# Patient Record
Sex: FEMALE | Race: AMERICAN INDIAN OR ALASKA NATIVE | NOT HISPANIC OR LATINO | Employment: FULL TIME | URBAN - METROPOLITAN AREA
[De-identification: names, ages, dates, MRNs, and addresses within clinical notes are randomized per-mention and may not be internally consistent; named-entity substitution may affect disease eponyms.]

---

## 2018-03-20 ENCOUNTER — HOSPITAL ENCOUNTER (EMERGENCY)
Facility: MEDICAL CENTER | Age: 38
End: 2018-03-20
Attending: EMERGENCY MEDICINE
Payer: MEDICAID

## 2018-03-20 VITALS
SYSTOLIC BLOOD PRESSURE: 112 MMHG | WEIGHT: 215.61 LBS | DIASTOLIC BLOOD PRESSURE: 70 MMHG | TEMPERATURE: 96.9 F | BODY MASS INDEX: 32.68 KG/M2 | OXYGEN SATURATION: 97 % | HEART RATE: 80 BPM | HEIGHT: 68 IN | RESPIRATION RATE: 16 BRPM

## 2018-03-20 DIAGNOSIS — R51.9 NONINTRACTABLE HEADACHE, UNSPECIFIED CHRONICITY PATTERN, UNSPECIFIED HEADACHE TYPE: ICD-10-CM

## 2018-03-20 PROCEDURE — 700111 HCHG RX REV CODE 636 W/ 250 OVERRIDE (IP): Performed by: EMERGENCY MEDICINE

## 2018-03-20 PROCEDURE — 99284 EMERGENCY DEPT VISIT MOD MDM: CPT

## 2018-03-20 PROCEDURE — 96375 TX/PRO/DX INJ NEW DRUG ADDON: CPT

## 2018-03-20 PROCEDURE — 700105 HCHG RX REV CODE 258: Performed by: EMERGENCY MEDICINE

## 2018-03-20 PROCEDURE — 96374 THER/PROPH/DIAG INJ IV PUSH: CPT

## 2018-03-20 RX ORDER — METOCLOPRAMIDE HYDROCHLORIDE 5 MG/ML
10 INJECTION INTRAMUSCULAR; INTRAVENOUS ONCE
Status: COMPLETED | OUTPATIENT
Start: 2018-03-20 | End: 2018-03-20

## 2018-03-20 RX ORDER — SODIUM CHLORIDE 9 MG/ML
1000 INJECTION, SOLUTION INTRAVENOUS ONCE
Status: COMPLETED | OUTPATIENT
Start: 2018-03-20 | End: 2018-03-20

## 2018-03-20 RX ORDER — DEXAMETHASONE SODIUM PHOSPHATE 4 MG/ML
10 INJECTION, SOLUTION INTRA-ARTICULAR; INTRALESIONAL; INTRAMUSCULAR; INTRAVENOUS; SOFT TISSUE ONCE
Status: COMPLETED | OUTPATIENT
Start: 2018-03-20 | End: 2018-03-20

## 2018-03-20 RX ADMIN — METOCLOPRAMIDE 10 MG: 5 INJECTION, SOLUTION INTRAMUSCULAR; INTRAVENOUS at 16:06

## 2018-03-20 RX ADMIN — SODIUM CHLORIDE 1000 ML: 9 INJECTION, SOLUTION INTRAVENOUS at 16:04

## 2018-03-20 RX ADMIN — DEXAMETHASONE SODIUM PHOSPHATE 10 MG: 4 INJECTION, SOLUTION INTRAMUSCULAR; INTRAVENOUS at 16:25

## 2018-03-20 ASSESSMENT — ENCOUNTER SYMPTOMS
SENSORY CHANGE: 1
SPEECH CHANGE: 0
FOCAL WEAKNESS: 0
BLURRED VISION: 1
NAUSEA: 0
VOMITING: 0
FEVER: 0
HEADACHES: 1
PHOTOPHOBIA: 1
COUGH: 0

## 2018-03-20 NOTE — ED TRIAGE NOTES
Chief Complaint   Patient presents with   • Blurred Vision     pt states she started to have vision changes of blurriness while  parking here. pt here visiting a patient. pt was told to come ER to checked out.

## 2018-03-20 NOTE — ED NOTES
Pt ambulated to yellow 67, blurred vision now resolved.  Pt said that he took some norco and ibuprofen and symptoms resolved.

## 2018-03-20 NOTE — ED PROVIDER NOTES
"ED Provider Note    Scribed for Diane Carolina D.O. by Fani Alaniz. 3/20/2018, 3:13 PM.    Primary care provider: Pcp Pt States None  Means of arrival: Walk-in  History obtained from: Patient   History limited by: None     CHIEF COMPLAINT  Chief Complaint   Patient presents with   • Blurred Vision     pt states she started to have vision changes of blurriness while  parking here. pt here visiting a patient. pt was told to come ER to checked out. pt denies headache, weakness, or falls.      HPI  Suellen Barbosa is a 37 y.o. female with history of migraines presents to the Emergency Department for evaluation of vision changes and migraine. Patient reports sudden onset vision changes approximately 1 hour ago. She describes vision changes as \"squiggly lines\" diffusely throughout her visual field with yellow discoloration throughout. Her vision changes has now resolved. Patient states she is also feeling \"shakey\" and dizzy. Which she attributes to not having eaten much today. The patient reports associated headache that was gradual onset since vision changes. Headache  is located to right sided forehead and behind her right eye. She describes the headache pain is sharp and 6 out of 10 in pain severity. Denies pain is radiating. She states associated photophobia. The patient states her current vision changes and symptoms are similar to her usual migraines. She states she took 1 tablet of ibuprofen and one Norco after headache began, because she \"knew it was a migraine\" this medication which did provide some relieved her headache. Denies nausea or vomiting.   Patient also reports that the hydrocodone, which she has because of a toothache, does \"throws her off\".  Patient denies further chronic medical history. She denies cigarette use, alcohol use, or drug use.     REVIEW OF SYSTEMS  Review of Systems   Constitutional: Negative for fever.   HENT: Negative for congestion.    Eyes: Positive for blurred vision and " "photophobia.   Respiratory: Negative for cough.    Gastrointestinal: Negative for nausea and vomiting.   Neurological: Positive for sensory change and headaches. Negative for speech change and focal weakness.   E    PAST MEDICAL HISTORY   The patient denies chronic medical history.   Migraine headaches    SURGICAL HISTORY  patient denies any surgical history    SOCIAL HISTORY  The patient denies cigarrette smoking, alcohol use, or drug use.    FAMILY HISTORY   None noted     CURRENT MEDICATIONS  No current facility-administered medications on file prior to encounter.      No current outpatient prescriptions on file prior to encounter.     ALLERGIES  No Known Allergies    PHYSICAL EXAM  VITAL SIGNS: /69   Pulse 71   Temp 36.1 °C (96.9 °F) (Temporal)   Resp 14   Ht 1.727 m (5' 8\")   Wt 97.8 kg (215 lb 9.8 oz)   SpO2 99%   BMI 32.78 kg/m²     Vitals reviewed.  Constitutional: Patient is oriented to person, place, and time. Appears well-developed and well-nourished. No distress.    Cardiovascular: Normal rate, regular rhythm and normal heart sounds. Normal peripheral pulses.  Pulmonary/Chest: Effort normal and breath sounds normal. No respiratory distress, no wheezes, rhonchi, or rales.  Musculoskeletal: No edema and no tenderness.   Neurological: No focal deficits. CN II-XII grossly intact. Normal strength and sensation grossly.   Skin: Skin is warm and dry. No erythema. No pallor.   Psychiatric: Patient has a normal mood and affect.     COURSE & MEDICAL DECISION MAKING  Pertinent Labs & Imaging studies reviewed. (See chart for details)    3:13 PM - Patient seen and examined at bedside. She presents with a headache and blurred vision. She reports that these symptoms are the same as symptoms she had previously when she had more frequent migraine headaches. Symptoms came on gradually. She also thinks that she has not eaten much today, this is also contributing to her headache. Patient will be treated with " "Reglan 10 mg and dexamethasone 10 mg. The patient will be given IV fluids despite normal vital signs. Suspect, that this is a migraine headache and likely a vascular headache and most of these patients, benefit from IV fluid resuscitation.     4:51 PM - Recheck: Patient is resting comfortably and reports feeling \"a whole lot better\". She reports resolution of her pain. I explained that she is now stable for discharge. I advised her to follow up with her primary care provider and to return to the ED for worsening or new onset symptoms. She understands and will comply.     The patient is referred to a primary physician for blood pressure management, diabetic screening, and for all other preventative health concerns.    DISPOSITION:  Patient will be discharged home in stable condition.    FOLLOW UP:  Your Physician  Varies    In 2 days      [facility], Emergency Dept  [address] [place] [id number] [phone]    If symptoms worsen    OUTPATIENT MEDICATIONS:  There are no discharge medications for this patient.    FINAL IMPRESSION  1. Nonintractable headache, unspecified chronicity pattern, unspecified headache type     Suspect migraine     Fani NOWAK (Scribe), am scribing for, and in the presence of, Diane Carolina D.O..    Electronically signed by: Fani Alaniz (Scribe), 3/20/2018    IDiane D.O. personally performed the services described in this documentation, as scribed by Fani Alaniz in my presence, and it is both accurate and complete.    The note accurately reflects work and decisions made by me.  Diane Carolina  3/20/2018  5:47 PM          "

## 2018-03-20 NOTE — DISCHARGE INSTRUCTIONS
Migraine Headache  A migraine headache is a very strong throbbing pain on one side or both sides of your head. Migraines can also cause other symptoms. Talk with your doctor about what things may bring on (trigger) your migraine headaches.  Follow these instructions at home:  Medicines  · Take over-the-counter and prescription medicines only as told by your doctor.  · Do not drive or use heavy machinery while taking prescription pain medicine.  · To prevent or treat constipation while you are taking prescription pain medicine, your doctor may recommend that you:  ¨ Drink enough fluid to keep your pee (urine) clear or pale yellow.  ¨ Take over-the-counter or prescription medicines.  ¨ Eat foods that are high in fiber. These include fresh fruits and vegetables, whole grains, and beans.  ¨ Limit foods that are high in fat and processed sugars. These include fried and sweet foods.  Lifestyle  · Avoid alcohol.  · Do not use any products that contain nicotine or tobacco, such as cigarettes and e-cigarettes. If you need help quitting, ask your doctor.  · Get at least 8 hours of sleep every night.  · Limit your stress.  General instructions  · Keep a journal to find out what may bring on your migraines. For example, write down:  ¨ What you eat and drink.  ¨ How much sleep you get.  ¨ Any change in what you eat or drink.  ¨ Any change in your medicines.  · If you have a migraine:  ¨ Avoid things that make your symptoms worse, such as bright lights.  ¨ It may help to lie down in a dark, quiet room.  ¨ Do not drive or use heavy machinery.  ¨ Ask your doctor what activities are safe for you.  · Keep all follow-up visits as told by your doctor. This is important.  Contact a doctor if:  · You get a migraine that is different or worse than your usual migraines.  Get help right away if:  · Your migraine gets very bad.  · You have a fever.  · You have a stiff neck.  · You have trouble seeing.  · Your muscles feel weak or like you  cannot control them.  · You start to lose your balance a lot.  · You start to have trouble walking.  · You pass out (faint).  This information is not intended to replace advice given to you by your health care provider. Make sure you discuss any questions you have with your health care provider.  Document Released: 09/26/2009 Document Revised: 07/07/2017 Document Reviewed: 06/05/2017  Elsevier Interactive Patient Education © 2017 [employer]

## 2018-03-20 NOTE — ED TRIAGE NOTES
Chief Complaint   Patient presents with   • Blurred Vision     pt states she started to have vision changes of blurriness while  parking here. pt here visiting a patient. pt was told to come ER to checked out. pt denies headache, weakness, or falls.

## 2019-09-27 ENCOUNTER — OFFICE VISIT (OUTPATIENT)
Dept: URGENT CARE | Facility: PHYSICIAN GROUP | Age: 39
End: 2019-09-27
Payer: MEDICAID

## 2019-09-27 VITALS
RESPIRATION RATE: 16 BRPM | SYSTOLIC BLOOD PRESSURE: 142 MMHG | DIASTOLIC BLOOD PRESSURE: 80 MMHG | WEIGHT: 230 LBS | OXYGEN SATURATION: 97 % | BODY MASS INDEX: 34.97 KG/M2 | HEART RATE: 60 BPM | TEMPERATURE: 97.8 F

## 2019-09-27 DIAGNOSIS — K04.7 TOOTH ABSCESS: Primary | ICD-10-CM

## 2019-09-27 PROCEDURE — 99204 OFFICE O/P NEW MOD 45 MIN: CPT | Performed by: PHYSICIAN ASSISTANT

## 2019-09-27 RX ORDER — METHYLPREDNISOLONE 4 MG/1
TABLET ORAL
Qty: 21 TAB | Refills: 0 | Status: SHIPPED | OUTPATIENT
Start: 2019-09-27 | End: 2020-08-18

## 2019-09-27 RX ORDER — IBUPROFEN 800 MG/1
800 TABLET ORAL EVERY 8 HOURS PRN
Qty: 30 TAB | Refills: 3 | Status: SHIPPED | OUTPATIENT
Start: 2019-09-27 | End: 2019-10-07

## 2019-09-27 RX ORDER — AMOXICILLIN AND CLAVULANATE POTASSIUM 875; 125 MG/1; MG/1
1 TABLET, FILM COATED ORAL 2 TIMES DAILY
Qty: 14 TAB | Refills: 0 | Status: SHIPPED | OUTPATIENT
Start: 2019-09-27 | End: 2019-10-04

## 2019-09-27 SDOH — HEALTH STABILITY: MENTAL HEALTH: HOW OFTEN DO YOU HAVE A DRINK CONTAINING ALCOHOL?: NEVER

## 2019-09-27 NOTE — PROGRESS NOTES
Subjective:      Pt is a 39 y.o. female who presents with Dental Pain (pulled wednesday, swollen and painful-feels like face is swollen )            HPI  This is a new problem. PT notes dental extraction 2 days ago and now notes redness, swelling and pain of tooth extraction region and left side of face. Pt has not taken any Rx medications for this condition. Pt states the pain is a 7/10, aching in nature and worse at night. Pt denies CP, SOB, NVD, paresthesias, headaches, dizziness, change in vision, hives, or other joint pain. The pt's medication list, problem list, and allergies have been evaluated and reviewed during today's visit.    PMH:  Negative per pt.      PSH:  Negative per pt.      Fam Hx:  the patient's family history is not pertinent to their current complaint    Soc HX:  Social History     Socioeconomic History   • Marital status: Single     Spouse name: Not on file   • Number of children: Not on file   • Years of education: Not on file   • Highest education level: Not on file   Occupational History   • Not on file   Social Needs   • Financial resource strain: Not on file   • Food insecurity:     Worry: Not on file     Inability: Not on file   • Transportation needs:     Medical: Not on file     Non-medical: Not on file   Tobacco Use   • Smoking status: Never Smoker   • Smokeless tobacco: Never Used   Substance and Sexual Activity   • Alcohol use: Never     Frequency: Never   • Drug use: Never   • Sexual activity: Not on file   Lifestyle   • Physical activity:     Days per week: Not on file     Minutes per session: Not on file   • Stress: Not on file   Relationships   • Social connections:     Talks on phone: Not on file     Gets together: Not on file     Attends [employer] service: Not on file     Active member of club or organization: Not on file     Attends meetings of clubs or organizations: Not on file     Relationship status: Not on file   • Intimate partner violence:     Fear of current or ex  partner: Not on file     Emotionally abused: Not on file     Physically abused: Not on file     Forced sexual activity: Not on file   Other Topics Concern   • Not on file   Social History Narrative   • Not on file         Medications:    Current Outpatient Medications:   •  methylPREDNISolone (MEDROL DOSEPAK) 4 MG Tablet Therapy Pack, Use as directed, Disp: 21 Tab, Rfl: 0  •  amoxicillin-clavulanate (AUGMENTIN) 875-125 MG Tab, Take 1 Tab by mouth 2 times a day for 7 days., Disp: 14 Tab, Rfl: 0  •  ibuprofen (MOTRIN) 800 MG Tab, Take 1 Tab by mouth every 8 hours as needed for up to 10 days., Disp: 30 Tab, Rfl: 3      Allergies:  Patient has no known allergies.    ROS  Constitutional: Negative for fever, chills and malaise/fatigue.   HENT: Negative for congestion and sore throat.  +tooth abscess from tooth extraction site   Eyes: Negative for blurred vision, double vision and photophobia.   Respiratory: Negative for cough and shortness of breath.  Cardiovascular: Negative for chest pain and palpitations.   Gastrointestinal: Negative for heartburn, nausea, vomiting, abdominal pain, diarrhea and constipation.   Genitourinary: Negative for dysuria and flank pain.   Musculoskeletal: Negative for joint pain and myalgias.   Skin: Negative for itching and rash.   Neurological: Negative for dizziness, tingling and headaches.   Endo/Heme/Allergies: Does not bruise/bleed easily.   Psychiatric/Behavioral: Negative for depression. The patient is not nervous/anxious.           Objective:     /80   Pulse 60   Temp 36.6 °C (97.8 °F)   Resp 16   Wt 104.3 kg (230 lb)   SpO2 97%   BMI 34.97 kg/m²      Physical Exam   HENT:   Mouth/Throat: Oropharynx is clear and moist and mucous membranes are normal. She does not have dentures. No oral lesions. No trismus in the jaw. Abnormal dentition. Dental abscesses and dental caries present. No uvula swelling or lacerations.             Constitutional: PT is oriented to person, place,  and time. PT appears well-developed and well-nourished. No distress.   HENT:   Head: Normocephalic and atraumatic.   Eyes: Conjunctivae normal and EOM are normal. Pupils are equal, round, and reactive to light.   Neck: Normal range of motion. Neck supple. No thyromegaly present.   Cardiovascular: Normal rate, regular rhythm, normal heart sounds and intact distal pulses.  Exam reveals no gallop and no friction rub.    No murmur heard.  Pulmonary/Chest: Effort normal and breath sounds normal. No respiratory distress. PT has no wheezes. PT has no rales. Pt exhibits no tenderness.   Abdominal: Soft. Bowel sounds are normal. PT exhibits no distension and no mass. There is no tenderness. There is no rebound and no guarding.   Musculoskeletal: Normal range of motion. PT exhibits no edema and no tenderness.   Neurological: PT is alert and oriented to person, place, and time. PT has normal reflexes. No cranial nerve deficit.   Skin: Skin is warm and dry. No rash noted. PT is not diaphoretic. No erythema.       Psychiatric: PT has a normal mood and affect. PT behavior is normal. Judgment and thought content normal.          Assessment/Plan:     1. Tooth abscess    - methylPREDNISolone (MEDROL DOSEPAK) 4 MG Tablet Therapy Pack; Use as directed  Dispense: 21 Tab; Refill: 0  - amoxicillin-clavulanate (AUGMENTIN) 875-125 MG Tab; Take 1 Tab by mouth 2 times a day for 7 days.  Dispense: 14 Tab; Refill: 0  - ibuprofen (MOTRIN) 800 MG Tab; Take 1 Tab by mouth every 8 hours as needed for up to 10 days.  Dispense: 30 Tab; Refill: 3    PT to follow with area dentist she notes  Rest, fluids encouraged.  AVS with medical info given.  Pt was in full understanding and agreement with the plan.  Differential diagnosis, natural history, supportive care, and indications for immediate follow-up discussed. All questions answered. Patient agrees with the plan of care.  Follow-up as needed if symptoms worsen or fail to improve.

## 2020-05-15 ENCOUNTER — OFFICE VISIT (OUTPATIENT)
Dept: URGENT CARE | Facility: PHYSICIAN GROUP | Age: 40
End: 2020-05-15
Payer: MEDICAID

## 2020-05-15 VITALS
WEIGHT: 230 LBS | DIASTOLIC BLOOD PRESSURE: 70 MMHG | OXYGEN SATURATION: 97 % | HEART RATE: 78 BPM | BODY MASS INDEX: 34.86 KG/M2 | SYSTOLIC BLOOD PRESSURE: 110 MMHG | RESPIRATION RATE: 16 BRPM | TEMPERATURE: 98 F | HEIGHT: 68 IN

## 2020-05-15 DIAGNOSIS — T14.8XXA SPLINTER: ICD-10-CM

## 2020-05-15 PROCEDURE — 10120 INC&RMVL FB SUBQ TISS SMPL: CPT | Performed by: NURSE PRACTITIONER

## 2020-05-15 PROCEDURE — 90471 IMMUNIZATION ADMIN: CPT | Performed by: NURSE PRACTITIONER

## 2020-05-15 PROCEDURE — 90715 TDAP VACCINE 7 YRS/> IM: CPT | Performed by: NURSE PRACTITIONER

## 2020-05-16 ASSESSMENT — ENCOUNTER SYMPTOMS
MYALGIAS: 0
VOMITING: 0
TINGLING: 0
HEARTBURN: 0
ORTHOPNEA: 0
WHEEZING: 0
SHORTNESS OF BREATH: 0
PALPITATIONS: 0
FEVER: 0
SORE THROAT: 0
CONSTIPATION: 0
BACK PAIN: 0
CHILLS: 0
HEADACHES: 0
COUGH: 0
DIZZINESS: 0
MEMORY LOSS: 0
DIARRHEA: 0
NAUSEA: 0

## 2020-05-16 NOTE — PROGRESS NOTES
"Subjective:      Suellen Barbosa is a 39 y.o. female who presents with Foreign Body (Splinter in RT thumb)            Patient reports hiking today with a large stick in her hand when she sustained a large splinter to her right thumb.  She tried multiple times to remove the splinter at home as did her sister and both were only able to retrieve small pieces of it. She presents to  with c/o continued pain and swelling to thumb.     Foreign Body in Skin   This is a new problem. The current episode started today. The problem occurs constantly. The problem has been unchanged. Pertinent negatives include no chest pain, chills, coughing, fever, headaches, myalgias, nausea, rash, sore throat or vomiting. Associated symptoms comments:  None. Exacerbated by: Palpation. Treatments tried: self removal of splinter. The treatment provided mild relief.       Review of Systems   Constitutional: Negative for chills and fever.   HENT: Negative for ear pain and sore throat.    Respiratory: Negative for cough, shortness of breath and wheezing.    Cardiovascular: Negative for chest pain, palpitations, orthopnea and leg swelling.   Gastrointestinal: Negative for constipation, diarrhea, heartburn, nausea and vomiting.   Musculoskeletal: Negative for back pain, joint pain and myalgias.   Skin: Negative for rash.        Thumb splinter   Neurological: Negative for dizziness, tingling and headaches.   Psychiatric/Behavioral: Negative for memory loss and suicidal ideas.   All other systems reviewed and are negative.         Objective:     /70   Pulse 78   Temp 36.7 °C (98 °F) (Temporal)   Resp 16   Ht 1.727 m (5' 8\")   Wt 104.3 kg (230 lb)   SpO2 97%   BMI 34.97 kg/m²      Physical Exam  Vitals signs reviewed.   Constitutional:       Appearance: Normal appearance.   HENT:      Head: Normocephalic.      Right Ear: External ear normal.      Left Ear: External ear normal.      Nose: Nose normal.      Mouth/Throat:      Mouth: " Mucous membranes are moist.   Eyes:      Extraocular Movements: Extraocular movements intact.      Conjunctiva/sclera: Conjunctivae normal.   Neck:      Musculoskeletal: Normal range of motion.   Cardiovascular:      Rate and Rhythm: Normal rate.      Pulses: Normal pulses.   Pulmonary:      Effort: Pulmonary effort is normal.   Abdominal:      General: Abdomen is flat. Bowel sounds are normal.      Palpations: Abdomen is soft.   Musculoskeletal: Normal range of motion.      Right hand: She exhibits tenderness and swelling. She exhibits normal range of motion, no bony tenderness, normal capillary refill, no deformity and no laceration. Normal sensation noted. Normal strength noted.        Hands:       Comments: Right thumb:   Embedded splinter with surrounding erythema, Edema and exquisite tenderness to palpation.   Skin:     General: Skin is warm and dry.      Capillary Refill: Capillary refill takes less than 2 seconds.   Neurological:      Mental Status: She is alert and oriented to person, place, and time.   Psychiatric:         Mood and Affect: Mood normal.         Behavior: Behavior normal.                 Assessment/Plan:       1. Splinter  Discussed with patient that we cannot guarantee the entire wood splinter was removed and that there may be some residual foreign material that we cannot visualize. Patient instructed on return precautions and verbalized understanding    Procedure: Skin Foreign Body Removal   -Risks, benefits and alternatives discussed. Risks including bleeding, nerve damage, infection and poor cosmetic outcome  -Clean technique with sterile instruments  -Local anesthesia using plain lidocaine  -Foreign body removed with 18ga needle and forceps  -Wound irrigated copiously with sterile saline  -No active bleeding after removal with minimal blood loss during procedure  -Patient tolerated well    - Tdap Vaccine =>6YO IM

## 2020-05-20 ENCOUNTER — HOSPITAL ENCOUNTER (OUTPATIENT)
Facility: MEDICAL CENTER | Age: 40
End: 2020-05-20
Attending: PHYSICIAN ASSISTANT
Payer: MEDICAID

## 2020-05-20 ENCOUNTER — OFFICE VISIT (OUTPATIENT)
Dept: URGENT CARE | Facility: PHYSICIAN GROUP | Age: 40
End: 2020-05-20
Payer: MEDICAID

## 2020-05-20 VITALS
HEART RATE: 90 BPM | WEIGHT: 230.6 LBS | DIASTOLIC BLOOD PRESSURE: 78 MMHG | TEMPERATURE: 97.4 F | SYSTOLIC BLOOD PRESSURE: 128 MMHG | HEIGHT: 68 IN | BODY MASS INDEX: 34.95 KG/M2 | OXYGEN SATURATION: 97 % | RESPIRATION RATE: 16 BRPM

## 2020-05-20 DIAGNOSIS — L03.011 CELLULITIS OF RIGHT THUMB: ICD-10-CM

## 2020-05-20 PROCEDURE — 87205 SMEAR GRAM STAIN: CPT

## 2020-05-20 PROCEDURE — 99214 OFFICE O/P EST MOD 30 MIN: CPT | Performed by: PHYSICIAN ASSISTANT

## 2020-05-20 PROCEDURE — 87070 CULTURE OTHR SPECIMN AEROBIC: CPT

## 2020-05-20 PROCEDURE — 87077 CULTURE AEROBIC IDENTIFY: CPT

## 2020-05-20 RX ORDER — DOXYCYCLINE HYCLATE 100 MG
100 TABLET ORAL 2 TIMES DAILY
Qty: 20 TAB | Refills: 0 | Status: SHIPPED | OUTPATIENT
Start: 2020-05-20 | End: 2020-05-30

## 2020-05-20 ASSESSMENT — ENCOUNTER SYMPTOMS
CHILLS: 0
SHORTNESS OF BREATH: 0
VOMITING: 0
NAUSEA: 0
SENSORY CHANGE: 0
FEVER: 0
COUGH: 0
FOCAL WEAKNESS: 0
TINGLING: 0

## 2020-05-20 ASSESSMENT — PAIN SCALES - GENERAL: PAINLEVEL: 6=MODERATE PAIN

## 2020-05-21 DIAGNOSIS — L03.011 CELLULITIS OF RIGHT THUMB: ICD-10-CM

## 2020-05-21 LAB
GRAM STN SPEC: NORMAL
SIGNIFICANT IND 70042: NORMAL
SITE SITE: NORMAL
SOURCE SOURCE: NORMAL

## 2020-05-21 NOTE — PROGRESS NOTES
"Subjective:      Suellen Barbosa is a 39 y.o. female who presents with Pain (pain in the right thumb. Pt states that she got a splinter about 5 days ago, soaking in espom salt twice a day. puss comes out everyday. very painful. swollen. Pt has not taken anything for the pain. )            The patient was seen in  5 days ago and a splinter was removed from the tip of her right thumb. She states she has noticed worsening swelling, redness and drainage over the past 5 days. She denies fever or chills. She states pain radiates up her right arm and that she will express pus from the wound. She has tried Epsom Salt soaks twice daily. Palpation or using her thumb makes the pain worse.    No past medical history on file.    No past surgical history on file.    No family history on file.    No Known Allergies    Medications, Allergies, and current problem list reviewed today in Epic    Review of Systems   Constitutional: Negative for chills, fever and malaise/fatigue.   Respiratory: Negative for cough and shortness of breath.    Gastrointestinal: Negative for nausea and vomiting.   Musculoskeletal: Negative for joint pain.   Skin:        Puncture wound right thumb- swelling, redness, purulent drainage    Neurological: Negative for tingling, sensory change and focal weakness.     All other systems reviewed and are negative.        Objective:     /78   Pulse 90   Temp 36.3 °C (97.4 °F) (Temporal)   Resp 16   Ht 1.727 m (5' 8\")   Wt 104.6 kg (230 lb 9.6 oz)   SpO2 97%   BMI 35.06 kg/m²      Physical Exam  Constitutional:       General: She is not in acute distress.  HENT:      Head: Normocephalic and atraumatic.   Eyes:      Conjunctiva/sclera: Conjunctivae normal.   Cardiovascular:      Rate and Rhythm: Normal rate.   Pulmonary:      Effort: Pulmonary effort is normal. No respiratory distress.   Musculoskeletal: Normal range of motion.      Comments: Right thumb with small open wound on tip of finger. Moderate " edema and erythema. Purulent drainage. FROM.    Neurological:      General: No focal deficit present.      Mental Status: She is alert and oriented to person, place, and time.   Psychiatric:         Mood and Affect: Mood normal.         Behavior: Behavior normal.         Thought Content: Thought content normal.         Judgment: Judgment normal.                 Assessment/Plan:       1. Cellulitis of right thumb    - CULTURE WOUND W/ GRAM STAIN; Future  - doxycycline (VIBRAMYCIN) 100 MG Tab; Take 1 Tab by mouth 2 times a day for 10 days.  Dispense: 20 Tab; Refill: 0    Continue epsom salt soaks    Differential diagnoses, Supportive care, and indications for immediate follow-up discussed with patient.   Pathogenesis of diagnosis discussed including typical length and natural progression.   Instructed to return to clinic or nearest emergency department for any change in condition, further concerns, or worsening of symptoms.    The patient demonstrated a good understanding and agreed with the treatment plan.    Diane Acosta P.A.-C.

## 2020-05-24 LAB
BACTERIA WND AEROBE CULT: NORMAL
GRAM STN SPEC: NORMAL
SIGNIFICANT IND 70042: NORMAL
SITE SITE: NORMAL
SOURCE SOURCE: NORMAL

## 2020-08-18 ENCOUNTER — OFFICE VISIT (OUTPATIENT)
Dept: URGENT CARE | Facility: PHYSICIAN GROUP | Age: 40
End: 2020-08-18
Payer: MEDICAID

## 2020-08-18 VITALS
BODY MASS INDEX: 34.66 KG/M2 | WEIGHT: 234 LBS | DIASTOLIC BLOOD PRESSURE: 82 MMHG | TEMPERATURE: 97.6 F | HEART RATE: 71 BPM | HEIGHT: 69 IN | OXYGEN SATURATION: 99 % | SYSTOLIC BLOOD PRESSURE: 116 MMHG | RESPIRATION RATE: 16 BRPM

## 2020-08-18 DIAGNOSIS — M25.50 ARTHRALGIA, UNSPECIFIED JOINT: ICD-10-CM

## 2020-08-18 PROCEDURE — 99214 OFFICE O/P EST MOD 30 MIN: CPT | Performed by: FAMILY MEDICINE

## 2020-08-18 RX ORDER — FERROUS SULFATE 325(65) MG
TABLET ORAL
COMMUNITY
Start: 2020-08-01 | End: 2020-10-20

## 2020-08-18 RX ORDER — PREDNISONE 20 MG/1
40 TABLET ORAL EVERY MORNING
Qty: 8 TAB | Refills: 0 | Status: SHIPPED
Start: 2020-08-19 | End: 2020-08-23

## 2020-08-18 RX ORDER — IRON,CARBONYL 15 MG
TABLET,CHEWABLE ORAL
COMMUNITY
Start: 2020-07-28 | End: 2020-10-20

## 2020-08-18 RX ORDER — METHYLPREDNISOLONE SODIUM SUCCINATE 125 MG/2ML
125 INJECTION, POWDER, LYOPHILIZED, FOR SOLUTION INTRAMUSCULAR; INTRAVENOUS ONCE
Status: COMPLETED | OUTPATIENT
Start: 2020-08-18 | End: 2020-08-18

## 2020-08-18 RX ADMIN — METHYLPREDNISOLONE SODIUM SUCCINATE 125 MG: 125 INJECTION, POWDER, LYOPHILIZED, FOR SOLUTION INTRAMUSCULAR; INTRAVENOUS at 17:11

## 2020-08-18 ASSESSMENT — PAIN SCALES - GENERAL: PAINLEVEL: 7=MODERATE-SEVERE PAIN

## 2020-08-18 ASSESSMENT — ENCOUNTER SYMPTOMS: MYALGIAS: 1

## 2020-08-18 NOTE — PROGRESS NOTES
"Subjective:      Suellen Barbosa is a 40 y.o. female who presents with Muscle Pain (x6 days ago, pt states she thinks it could be due to her antibiotics) and Foot Swelling (x6 days ago. )    - This is a pleasant and non toxic appearing 40 y.o. female with c/o ~6 days of feeling pain in joints of knees elbows and ankles. Feels ankles are a little swollen. No trauma or NVFC/cp/sob. Just finished a 14 day course of Doxy for some sort of Rt breast deep soft tissue infection that she says has improved. She was admitted at [facility] for this            ALLERGIES:  Patient has no known allergies.     PMH:  History reviewed. No pertinent past medical history.     PSH:  History reviewed. No pertinent surgical history.    MEDS:    Current Outpatient Medications:   •  IRON CHEWS PEDIATRIC 15 MG Chew Tab, , Disp: , Rfl:   •  ferrous sulfate 325 (65 Fe) MG tablet, , Disp: , Rfl:   •  [START ON 8/19/2020] predniSONE (DELTASONE) 20 MG Tab, Take 2 Tabs by mouth every morning for 4 days., Disp: 8 Tab, Rfl: 0    ** I have documented what I find to be significant in regards to past medical, social, family and surgical history  in my HPI or under PMH/PSH/FH review section, otherwise it is contributory **             HPI    Review of Systems   Musculoskeletal: Positive for joint pain and myalgias.   All other systems reviewed and are negative.         Objective:     /82   Pulse 71   Temp 36.4 °C (97.6 °F) (Temporal)   Resp 16   Ht 1.753 m (5' 9\")   Wt 106.1 kg (234 lb)   SpO2 99%   BMI 34.56 kg/m²      Physical Exam  Vitals signs and nursing note reviewed.   Constitutional:       General: She is not in acute distress.     Appearance: She is well-developed. She is not diaphoretic.   HENT:      Head: Normocephalic and atraumatic.   Eyes:      General: No scleral icterus.     Conjunctiva/sclera: Conjunctivae normal.   Cardiovascular:      Heart sounds: Normal heart sounds. No murmur.   Pulmonary:      Effort: Pulmonary " effort is normal. No respiratory distress.      Breath sounds: Normal breath sounds.   Musculoskeletal: Normal range of motion.         General: Tenderness ( reports some pain w/ ROM of upper and lower limbs, mainly knee ankle and elbows) present. No swelling ( no obvious edema erythema of joints).   Skin:     Coloration: Skin is not pale.      Findings: No rash.   Neurological:      Mental Status: She is alert.      Motor: No abnormal muscle tone.   Psychiatric:         Mood and Affect: Mood normal.         Behavior: Behavior normal.         Judgment: Judgment normal.                 Assessment/Plan:            1. Arthralgia, unspecified joint  methylPREDNISolone sod succ (SOLU-MEDROL) 125 MG injection 125 mg    predniSONE (DELTASONE) 20 MG Tab       - rest  - E.R. precautions discussed     Dx & d/c instructions discussed w/ patient and/or family members.     Follow up with PCP in 2-3 days, ER if not improving or feeling/getting worse.

## 2020-08-26 ENCOUNTER — OFFICE VISIT (OUTPATIENT)
Dept: URGENT CARE | Facility: PHYSICIAN GROUP | Age: 40
End: 2020-08-26
Payer: MEDICAID

## 2020-08-26 VITALS
RESPIRATION RATE: 16 BRPM | TEMPERATURE: 97.3 F | BODY MASS INDEX: 34.26 KG/M2 | OXYGEN SATURATION: 98 % | DIASTOLIC BLOOD PRESSURE: 76 MMHG | HEART RATE: 94 BPM | WEIGHT: 232 LBS | SYSTOLIC BLOOD PRESSURE: 120 MMHG

## 2020-08-26 DIAGNOSIS — N61.0 ACUTE MASTITIS OF RIGHT BREAST: ICD-10-CM

## 2020-08-26 DIAGNOSIS — M25.50 ARTHRALGIA, UNSPECIFIED JOINT: ICD-10-CM

## 2020-08-26 PROCEDURE — 99214 OFFICE O/P EST MOD 30 MIN: CPT | Performed by: PHYSICIAN ASSISTANT

## 2020-08-26 RX ORDER — SULFAMETHOXAZOLE AND TRIMETHOPRIM 800; 160 MG/1; MG/1
1 TABLET ORAL 2 TIMES DAILY
Qty: 14 TAB | Refills: 0 | Status: SHIPPED | OUTPATIENT
Start: 2020-08-26 | End: 2020-09-02

## 2020-08-26 RX ORDER — METHYLPREDNISOLONE 4 MG/1
TABLET ORAL
Qty: 21 TAB | Refills: 0 | Status: SHIPPED | OUTPATIENT
Start: 2020-08-26 | End: 2020-09-09

## 2020-08-26 ASSESSMENT — ENCOUNTER SYMPTOMS
VOMITING: 0
PHOTOPHOBIA: 0
BLURRED VISION: 0
HEMOPTYSIS: 0
MYALGIAS: 1
NAUSEA: 0
CHILLS: 0
SHORTNESS OF BREATH: 0
COUGH: 0
EYE PAIN: 0
FEVER: 0
DIARRHEA: 0
BACK PAIN: 0
NECK PAIN: 0
WHEEZING: 0
DOUBLE VISION: 0
EYE REDNESS: 0
SPUTUM PRODUCTION: 0
SORE THROAT: 0
ABDOMINAL PAIN: 0
FALLS: 0
PALPITATIONS: 0
DIZZINESS: 1
EYE DISCHARGE: 0

## 2020-08-26 NOTE — PATIENT INSTRUCTIONS
Joint Pain  Joint pain (arthralgia) may be caused by many things. Joint pain is likely to go away when you follow instructions from your health care provider for relieving pain at home. However, joint pain can also be caused by conditions that require more treatment. Common causes of joint pain include:  · Bruising in the area of the joint.  · Injury caused by repeating certain movements too many times (overuse injury).  · Age-related joint wear and tear (osteoarthritis).  · Buildup of uric acid crystals in the joint (gout).  · Inflammation of the joint (rheumatic disease).  · Various other forms of arthritis.  · Infections of the joint (septic arthritis) or of the bone (osteomyelitis).  Your health care provider may recommend that you take pain medicine or wear a supportive device like an elastic bandage, sling, or splint. If your joint pain continues, you may need lab or imaging tests to diagnose the cause of your joint pain.  Follow these instructions at home:  Managing pain, stiffness, and swelling    · If directed, put ice on the painful area. Icing can help to relieve joint pain and swelling.  ? Put ice in a plastic bag.  ? Place a towel between your skin and the bag.  ? Leave the ice on for 20 minutes, 2-3 times a day.  · If directed, apply heat to the painful area as often as told by your health care provider. Heat can reduce the stiffness of your muscles and joints. Use the heat source that your health care provider recommends, such as a moist heat pack or a heating pad.  ? Place a towel between your skin and the heat source.  ? Leave the heat on for 20-30 minutes.  ? Remove the heat if your skin turns bright red. This is especially important if you are unable to feel pain, heat, or cold. You may have a greater risk of getting burned.  · Move your fingers or toes below the painful joint often. You can avoid stiffness and lessen swelling by doing this.  · If possible, raise (elevate) the painful joint above  the level of your heart while you are sitting or lying down. To do this, try putting a few pillows under the painful joint.  Activity  · Rest the painful joint for as long as directed. Do not do anything that causes or worsens pain.  · Begin exercising or stretching the affected area, as told by your health care provider. Ask your health care provider what types of exercise are safe for you.  If you have an elastic bandage, sling, or splint:  · Wear the supportive device as told by your health care provider. Remove it only as told by your health care provider.  · Loosen the device if your fingers or toes below the joint tingle, become numb, or turn cold and blue.  · Keep the device clean.   · Ask your health care provider if you should remove the device before bathing. You may need to cover it with a watertight covering when you take a bath or a shower.  General instructions  · Take over-the-counter and prescription medicines only as told by your health care provider.  · Do not use any products that contain nicotine or tobacco, such as cigarettes and e-cigarettes. If you need help quitting, ask your health care provider.  · Keep all follow-up visits as told by your health care provider. This is important.  Contact a health care provider if:  · You have pain that gets worse and does not get better with medicine.  · Your joint pain does not improve within 3 days.  · You have increased bruising or swelling.  · You have a fever.  · You lose 10 lb (4.5 kg) or more without trying.  Get help right away if:  · You cannot move the joint.  · Your fingers or toes tingle, become numb, or turn cold and blue.  · You have a fever along with a joint that is red, warm, and swollen.  Summary  · Joint pain (arthralgia) may be caused by many things.  · Your health care provider may recommend that you take pain medicine or wear a supportive device like an elastic bandage, sling, or splint.  · If your joint pain continues, you may need  tests to diagnose the cause of your joint pain.  · Take over-the-counter and prescription medicines only as told by your health care provider.  This information is not intended to replace advice given to you by your health care provider. Make sure you discuss any questions you have with your health care provider.  Document Released: 12/18/2006 Document Revised: 11/30/2018 Document Reviewed: 10/03/2018  Elsevier Patient Education © 2020 [employer]  Mastitis    Mastitis is irritation and swelling (inflammation) in an area of the breast. It is often caused by an infection that occurs when germs (bacteria) enter the skin. This most often happens to breastfeeding mothers, but it can happen to other women too as well as some men.  Follow these instructions at home:  Medicines  · Take over-the-counter and prescription medicines only as told by your doctor.  · If you were prescribed an antibiotic medicine, take it as told by your doctor. Do not stop taking it even if you start to feel better.  General instructions  · Do not wear a tight or underwire bra. Wear a soft support bra.  · Drink more fluids, especially if you have a fever.  · Get plenty of rest.  If you are breastfeeding:    · Keep emptying your breasts by breastfeeding or by using a breast pump.  · Keep your nipples clean and dry.  · During breastfeeding, empty the first breast before going to the other breast. Use a breast pump if your baby is not emptying your breasts.  · Massage your breasts during feeding or pumping as told by your doctor.  · If told, put moist heat on the affected area of your breast right before breastfeeding or pumping. Use the heat source that your doctor tells you to use.  · If told, put ice on the affected area of your breast right after breastfeeding or pumping:  ? Put ice in a plastic bag.  ? Place a towel between your skin and the bag.  ? Leave the ice on for 20 minutes.  · If you go back to work, pump your breasts while at  work.  · Avoid letting your breasts get overly filled with milk (engorged).  Contact a doctor if:  · You have pus-like fluid leaking from your breast.  · You have a fever.  · Your symptoms do not get better within 2 days.  Get help right away if:  · Your pain and swelling are getting worse.  · Your pain is not helped by medicine.  · You have a red line going from your breast toward your armpit.  Summary  · Mastitis is irritation and swelling in an area of the breast.  · If you were prescribed an antibiotic medicine, do not stop taking it even if you start to feel better.  · Drink more fluids and get plenty of rest.  · Contact a doctor if your symptoms do not get better within 2 days.  This information is not intended to replace advice given to you by your health care provider. Make sure you discuss any questions you have with your health care provider.  Document Released: 12/06/2010 Document Revised: 11/30/2018 Document Reviewed: 01/09/2018  Elsevier Patient Education © 2020 Elsevier Inc.

## 2020-08-26 NOTE — PROGRESS NOTES
"Subjective:   Suellen Barbosa is a 40 y.o. female who presents for Joint Pain (Pt states all over her body x2wks/ ) and Lump (R breast pain notice it 3wks ago)    HPI  Patient is a 40-year-old female who presents with joint pain all over her body onset 2 weeks.  She has a history of right sided mastitis approximately 3-1/2 weeks ago.  She presented to [facility] emergency room and was given IV fluids, IV antibiotics, and a blood transfusion due to anemia per patient.  She was placed on doxycycline for 2 weeks.  She finished her doxycycline 1.5 weeks ago.  She notes 50% improvement in size, pain, and redness of her right breast.  She believes her generalized joint pain is a side effect of the doxycycline.  She also read that doxycycline causes lupus.  She has associated fatigue and occasional dizziness feeling like the flu.  Denies any near-syncope or syncopal episodes.  Occasionally it is hard to take a deep breath.  She otherwise denies any fevers, chills, sore throat, cough, chest pain, shortness of breath, abdominal pain, nausea, vomiting, diarrhea, loss of sense of taste or smell.     No known exposure to COVID-19.      Review of Systems   Constitutional: Positive for malaise/fatigue. Negative for chills and fever.   HENT: Negative.  Negative for congestion, ear discharge, ear pain and sore throat.    Eyes: Negative for blurred vision, double vision, photophobia, pain, discharge and redness.        \"vision is different\"    Respiratory: Negative for cough, hemoptysis, sputum production, shortness of breath and wheezing.    Cardiovascular: Negative for chest pain, palpitations and leg swelling.   Gastrointestinal: Negative for abdominal pain, diarrhea, nausea and vomiting.   Musculoskeletal: Positive for joint pain and myalgias. Negative for back pain, falls and neck pain.   Skin: Negative.    Neurological: Positive for dizziness.       Medications:    • ferrous sulfate  • Iron Chews Pediatric " Chew    Allergies: Patient has no known allergies.    Problem List: Suellen Barbosa does not have a problem list on file.    Surgical History:  No past surgical history on file.    Past Social Hx: Suellen Barbosa  reports that she has never smoked. She has never used smokeless tobacco. She reports that she does not drink alcohol or use drugs.     Past Family Hx:  Suellen Barbosa family history is not on file.     Problem list, medications, and allergies reviewed by myself today in Epic.     Objective:     /76   Pulse 94   Temp 36.3 °C (97.3 °F) (Temporal)   Resp 16   Wt 105.2 kg (232 lb)   SpO2 98%   BMI 34.26 kg/m²     Physical Exam  Vitals signs reviewed. Exam conducted with a chaperone present.   Constitutional:       General: She is not in acute distress.     Appearance: She is not ill-appearing, toxic-appearing or diaphoretic.   HENT:      Right Ear: Tympanic membrane normal.      Left Ear: Tympanic membrane normal.      Mouth/Throat:      Mouth: Mucous membranes are moist.      Pharynx: Oropharynx is clear. No oropharyngeal exudate or posterior oropharyngeal erythema.   Eyes:      Extraocular Movements: Extraocular movements intact.      Conjunctiva/sclera: Conjunctivae normal.      Pupils: Pupils are equal, round, and reactive to light.   Neck:      Musculoskeletal: Normal range of motion and neck supple. No neck rigidity or muscular tenderness.   Cardiovascular:      Rate and Rhythm: Normal rate and regular rhythm.      Heart sounds: Normal heart sounds.   Pulmonary:      Effort: Pulmonary effort is normal. No respiratory distress.      Breath sounds: Normal breath sounds. No wheezing, rhonchi or rales.   Chest:      Breasts:         Left: Tenderness present. No bleeding, inverted nipple, mass or nipple discharge.          Comments: Area above:  Trace very light erythema as shown inside the red [place].  Red [place] area of hard induration.  There is no surrounding edema, crepitus, fluctuance.    Positive mild tenderness to palpation.  Negative nipple discharge.  Lymphadenopathy:      Cervical: No cervical adenopathy.      Upper Body:      Left upper body: No supraclavicular adenopathy.   Skin:     General: Skin is warm and dry.   Neurological:      General: No focal deficit present.      Mental Status: She is alert and oriented to person, place, and time.   Psychiatric:         Mood and Affect: Mood normal.       Assessment/Plan:     Diagnosis and associated orders:     1. Acute mastitis of right breast  US-BREAST LIMITED-LEFT    REFERRAL TO FAMILY PRACTICE    sulfamethoxazole-trimethoprim (BACTRIM DS) 800-160 MG tablet   2. Arthralgia, unspecified joint  REFERRAL TO FAMILY PRACTICE    methylPREDNISolone (MEDROL DOSEPAK) 4 MG Tablet Therapy Pack      Comments/MDM:     • This is a well-appearing 40-year-old female who presents for a follow-up on her right sided breast mastitis.  She was placed on doxycycline for 2 weeks and notes generalized arthralgias.  Patient believes her arthralgias are a medication side effect of doxycycline.  She also believes that doxycycline causes lupus.  She has some associated fatigue and overall just not feeling well. Discussed with patient doxycycline should not cause joint pain and does not cause lupus.   • Patient notes 50% improvement of her right breast swelling, redness, and pain since being on doxycycline for 2 weeks and finishing it.  She denies any worsening.   • Discussed multiple options for plan of care.   • Ultimately agreed on trial of Bactrim for 7 days.   • Plenty of fluids, rest, Medrol Dosepak only in the morning time.   • If breast mastitis not improving or at least resolved in the next 5 to 7 days, ultrasound of right breast ordered to rule out possible deep abscess.   • Discussed unknown etiology of her generalized arthralgias.  Discussed possibility of autoimmune.  However, possibility of COVID-19.  Recommend she be tested for COVID-19.  Her insurance is  Medicaid, and she states she can be tested for free through [facility].   • Will call for ultrasound results and appropriate therapeutic changes if patient decides to proceed with this.   • Highly recommend she present to the emergency room with any worsening redness, swelling, drainage, fevers, chills, fatigue, shortness of breath, chest pain, or any other concerns.    Referral to PCP placed.          Red flags discussed and indications to immediately call 911 or present to the Emergency Department.   Supportive care, differential diagnoses, and indications for immediate follow-up discussed with patient.    Pathogenesis of diagnosis discussed including typical length and natural progression. Patient expresses understanding and agrees to plan.    Advised the patient to follow-up with the primary care physician for recheck, reevaluation, and consideration of further management.    Please note that this dictation was created using voice recognition software. I have made a reasonable attempt to correct obvious errors, but I expect that there are errors of grammar and possibly content that I did not discover before finalizing the note.    This note was electronically signed by Víctor De La Rosa PA-C

## 2020-09-09 ENCOUNTER — APPOINTMENT (OUTPATIENT)
Dept: RADIOLOGY | Facility: MEDICAL CENTER | Age: 40
DRG: 584 | End: 2020-09-09
Attending: EMERGENCY MEDICINE
Payer: MEDICAID

## 2020-09-09 ENCOUNTER — HOSPITAL ENCOUNTER (INPATIENT)
Facility: MEDICAL CENTER | Age: 40
LOS: 2 days | DRG: 584 | End: 2020-09-11
Attending: EMERGENCY MEDICINE | Admitting: INTERNAL MEDICINE
Payer: MEDICAID

## 2020-09-09 DIAGNOSIS — R70.0 ELEVATED SEDIMENTATION RATE: ICD-10-CM

## 2020-09-09 DIAGNOSIS — D64.9 ANEMIA, UNSPECIFIED TYPE: ICD-10-CM

## 2020-09-09 DIAGNOSIS — R79.82 ELEVATED C-REACTIVE PROTEIN (CRP): ICD-10-CM

## 2020-09-09 DIAGNOSIS — N61.1 BREAST ABSCESS: ICD-10-CM

## 2020-09-09 PROBLEM — E87.1 HYPONATREMIA: Status: ACTIVE | Noted: 2020-09-09

## 2020-09-09 LAB
ALBUMIN SERPL BCP-MCNC: 4.1 G/DL (ref 3.2–4.9)
ALBUMIN/GLOB SERPL: 1.2 G/DL
ALP SERPL-CCNC: 82 U/L (ref 30–99)
ALT SERPL-CCNC: 33 U/L (ref 2–50)
ANION GAP SERPL CALC-SCNC: 13 MMOL/L (ref 7–16)
APPEARANCE UR: CLEAR
AST SERPL-CCNC: 20 U/L (ref 12–45)
BASOPHILS # BLD AUTO: 0.3 % (ref 0–1.8)
BASOPHILS # BLD: 0.03 K/UL (ref 0–0.12)
BILIRUB SERPL-MCNC: 0.2 MG/DL (ref 0.1–1.5)
BILIRUB UR QL STRIP.AUTO: NEGATIVE
BUN SERPL-MCNC: 9 MG/DL (ref 8–22)
CALCIUM SERPL-MCNC: 9.5 MG/DL (ref 8.5–10.5)
CHLORIDE SERPL-SCNC: 98 MMOL/L (ref 96–112)
CO2 SERPL-SCNC: 22 MMOL/L (ref 20–33)
COLOR UR: YELLOW
COVID ORDER STATUS COVID19: NORMAL
CREAT SERPL-MCNC: 0.7 MG/DL (ref 0.5–1.4)
CRP SERPL HS-MCNC: 3.2 MG/DL (ref 0–0.75)
EKG IMPRESSION: NORMAL
EOSINOPHIL # BLD AUTO: 0.01 K/UL (ref 0–0.51)
EOSINOPHIL NFR BLD: 0.1 % (ref 0–6.9)
ERYTHROCYTE [DISTWIDTH] IN BLOOD BY AUTOMATED COUNT: 61 FL (ref 35.9–50)
ERYTHROCYTE [SEDIMENTATION RATE] IN BLOOD BY WESTERGREN METHOD: 35 MM/HOUR (ref 0–20)
GLOBULIN SER CALC-MCNC: 3.4 G/DL (ref 1.9–3.5)
GLUCOSE SERPL-MCNC: 105 MG/DL (ref 65–99)
GLUCOSE UR STRIP.AUTO-MCNC: NEGATIVE MG/DL
HCG SERPL QL: NEGATIVE
HCT VFR BLD AUTO: 33 % (ref 37–47)
HGB BLD-MCNC: 9.9 G/DL (ref 12–16)
IMM GRANULOCYTES # BLD AUTO: 0.03 K/UL (ref 0–0.11)
IMM GRANULOCYTES NFR BLD AUTO: 0.3 % (ref 0–0.9)
KETONES UR STRIP.AUTO-MCNC: NEGATIVE MG/DL
LACTATE BLD-SCNC: 1.3 MMOL/L (ref 0.5–2)
LEUKOCYTE ESTERASE UR QL STRIP.AUTO: NEGATIVE
LIPASE SERPL-CCNC: 26 U/L (ref 11–82)
LYMPHOCYTES # BLD AUTO: 1.12 K/UL (ref 1–4.8)
LYMPHOCYTES NFR BLD: 10.4 % (ref 22–41)
MCH RBC QN AUTO: 24.9 PG (ref 27–33)
MCHC RBC AUTO-ENTMCNC: 30 G/DL (ref 33.6–35)
MCV RBC AUTO: 82.9 FL (ref 81.4–97.8)
MICRO URNS: NORMAL
MONOCYTES # BLD AUTO: 0.31 K/UL (ref 0–0.85)
MONOCYTES NFR BLD AUTO: 2.9 % (ref 0–13.4)
NEUTROPHILS # BLD AUTO: 9.26 K/UL (ref 2–7.15)
NEUTROPHILS NFR BLD: 86 % (ref 44–72)
NITRITE UR QL STRIP.AUTO: NEGATIVE
NRBC # BLD AUTO: 0 K/UL
NRBC BLD-RTO: 0 /100 WBC
PH UR STRIP.AUTO: 6 [PH] (ref 5–8)
PLATELET # BLD AUTO: 443 K/UL (ref 164–446)
PMV BLD AUTO: 8.2 FL (ref 9–12.9)
POTASSIUM SERPL-SCNC: 4.4 MMOL/L (ref 3.6–5.5)
PROT SERPL-MCNC: 7.5 G/DL (ref 6–8.2)
PROT UR QL STRIP: NEGATIVE MG/DL
RBC # BLD AUTO: 3.98 M/UL (ref 4.2–5.4)
RBC UR QL AUTO: NEGATIVE
SARS-COV-2 RNA RESP QL NAA+PROBE: NOTDETECTED
SODIUM SERPL-SCNC: 133 MMOL/L (ref 135–145)
SP GR UR STRIP.AUTO: 1.01
SPECIMEN SOURCE: NORMAL
TROPONIN T SERPL-MCNC: <6 NG/L (ref 6–19)
TSH SERPL DL<=0.005 MIU/L-ACNC: 0.62 UIU/ML (ref 0.38–5.33)
UROBILINOGEN UR STRIP.AUTO-MCNC: 0.2 MG/DL
WBC # BLD AUTO: 10.8 K/UL (ref 4.8–10.8)

## 2020-09-09 PROCEDURE — 86140 C-REACTIVE PROTEIN: CPT

## 2020-09-09 PROCEDURE — 700105 HCHG RX REV CODE 258: Performed by: EMERGENCY MEDICINE

## 2020-09-09 PROCEDURE — 71045 X-RAY EXAM CHEST 1 VIEW: CPT

## 2020-09-09 PROCEDURE — 83540 ASSAY OF IRON: CPT

## 2020-09-09 PROCEDURE — 85652 RBC SED RATE AUTOMATED: CPT

## 2020-09-09 PROCEDURE — 99223 1ST HOSP IP/OBS HIGH 75: CPT | Performed by: INTERNAL MEDICINE

## 2020-09-09 PROCEDURE — 84443 ASSAY THYROID STIM HORMONE: CPT

## 2020-09-09 PROCEDURE — 83605 ASSAY OF LACTIC ACID: CPT

## 2020-09-09 PROCEDURE — 83550 IRON BINDING TEST: CPT

## 2020-09-09 PROCEDURE — 96367 TX/PROPH/DG ADDL SEQ IV INF: CPT

## 2020-09-09 PROCEDURE — 81003 URINALYSIS AUTO W/O SCOPE: CPT

## 2020-09-09 PROCEDURE — 36415 COLL VENOUS BLD VENIPUNCTURE: CPT

## 2020-09-09 PROCEDURE — C9803 HOPD COVID-19 SPEC COLLECT: HCPCS | Performed by: EMERGENCY MEDICINE

## 2020-09-09 PROCEDURE — 84703 CHORIONIC GONADOTROPIN ASSAY: CPT

## 2020-09-09 PROCEDURE — 700111 HCHG RX REV CODE 636 W/ 250 OVERRIDE (IP): Performed by: EMERGENCY MEDICINE

## 2020-09-09 PROCEDURE — 93005 ELECTROCARDIOGRAM TRACING: CPT | Performed by: EMERGENCY MEDICINE

## 2020-09-09 PROCEDURE — 96365 THER/PROPH/DIAG IV INF INIT: CPT

## 2020-09-09 PROCEDURE — 84484 ASSAY OF TROPONIN QUANT: CPT

## 2020-09-09 PROCEDURE — 87040 BLOOD CULTURE FOR BACTERIA: CPT | Mod: 91

## 2020-09-09 PROCEDURE — 82746 ASSAY OF FOLIC ACID SERUM: CPT

## 2020-09-09 PROCEDURE — 76604 US EXAM CHEST: CPT

## 2020-09-09 PROCEDURE — 82607 VITAMIN B-12: CPT

## 2020-09-09 PROCEDURE — 85025 COMPLETE CBC W/AUTO DIFF WBC: CPT

## 2020-09-09 PROCEDURE — 83690 ASSAY OF LIPASE: CPT

## 2020-09-09 PROCEDURE — 80053 COMPREHEN METABOLIC PANEL: CPT

## 2020-09-09 PROCEDURE — 99291 CRITICAL CARE FIRST HOUR: CPT

## 2020-09-09 PROCEDURE — 770006 HCHG ROOM/CARE - MED/SURG/GYN SEMI*

## 2020-09-09 PROCEDURE — U0003 INFECTIOUS AGENT DETECTION BY NUCLEIC ACID (DNA OR RNA); SEVERE ACUTE RESPIRATORY SYNDROME CORONAVIRUS 2 (SARS-COV-2) (CORONAVIRUS DISEASE [COVID-19]), AMPLIFIED PROBE TECHNIQUE, MAKING USE OF HIGH THROUGHPUT TECHNOLOGIES AS DESCRIBED BY CMS-2020-01-R: HCPCS

## 2020-09-09 RX ORDER — OXYCODONE HYDROCHLORIDE 5 MG/1
5 TABLET ORAL
Status: DISCONTINUED | OUTPATIENT
Start: 2020-09-09 | End: 2020-09-11 | Stop reason: HOSPADM

## 2020-09-09 RX ORDER — ONDANSETRON 4 MG/1
4 TABLET, ORALLY DISINTEGRATING ORAL EVERY 4 HOURS PRN
Status: DISCONTINUED | OUTPATIENT
Start: 2020-09-09 | End: 2020-09-11 | Stop reason: HOSPADM

## 2020-09-09 RX ORDER — ACETAMINOPHEN 325 MG/1
650 TABLET ORAL EVERY 6 HOURS PRN
Status: DISCONTINUED | OUTPATIENT
Start: 2020-09-09 | End: 2020-09-11 | Stop reason: HOSPADM

## 2020-09-09 RX ORDER — SULFAMETHOXAZOLE AND TRIMETHOPRIM 800; 160 MG/1; MG/1
1 TABLET ORAL 2 TIMES DAILY
Status: ON HOLD | COMMUNITY
End: 2020-09-11

## 2020-09-09 RX ORDER — AMOXICILLIN 250 MG
2 CAPSULE ORAL 2 TIMES DAILY
Status: DISCONTINUED | OUTPATIENT
Start: 2020-09-09 | End: 2020-09-11 | Stop reason: HOSPADM

## 2020-09-09 RX ORDER — SODIUM CHLORIDE 9 MG/ML
INJECTION, SOLUTION INTRAVENOUS CONTINUOUS
Status: DISCONTINUED | OUTPATIENT
Start: 2020-09-09 | End: 2020-09-10

## 2020-09-09 RX ORDER — BISACODYL 10 MG
10 SUPPOSITORY, RECTAL RECTAL
Status: DISCONTINUED | OUTPATIENT
Start: 2020-09-09 | End: 2020-09-11 | Stop reason: HOSPADM

## 2020-09-09 RX ORDER — METHYLPREDNISOLONE 4 MG/1
8 TABLET ORAL DAILY
Status: ON HOLD | COMMUNITY
End: 2020-09-11

## 2020-09-09 RX ORDER — MORPHINE SULFATE 4 MG/ML
4 INJECTION, SOLUTION INTRAMUSCULAR; INTRAVENOUS
Status: DISCONTINUED | OUTPATIENT
Start: 2020-09-09 | End: 2020-09-11 | Stop reason: HOSPADM

## 2020-09-09 RX ORDER — OXYCODONE HYDROCHLORIDE 10 MG/1
10 TABLET ORAL
Status: DISCONTINUED | OUTPATIENT
Start: 2020-09-09 | End: 2020-09-11 | Stop reason: HOSPADM

## 2020-09-09 RX ORDER — FOLIC ACID 1 MG/1
1 TABLET ORAL DAILY
COMMUNITY
End: 2020-10-20

## 2020-09-09 RX ORDER — POLYETHYLENE GLYCOL 3350 17 G/17G
1 POWDER, FOR SOLUTION ORAL
Status: DISCONTINUED | OUTPATIENT
Start: 2020-09-09 | End: 2020-09-11 | Stop reason: HOSPADM

## 2020-09-09 RX ORDER — SODIUM CHLORIDE, SODIUM LACTATE, POTASSIUM CHLORIDE, CALCIUM CHLORIDE 600; 310; 30; 20 MG/100ML; MG/100ML; MG/100ML; MG/100ML
INJECTION, SOLUTION INTRAVENOUS CONTINUOUS
Status: DISCONTINUED | OUTPATIENT
Start: 2020-09-09 | End: 2020-09-09

## 2020-09-09 RX ORDER — ONDANSETRON 2 MG/ML
4 INJECTION INTRAMUSCULAR; INTRAVENOUS EVERY 4 HOURS PRN
Status: DISCONTINUED | OUTPATIENT
Start: 2020-09-09 | End: 2020-09-11 | Stop reason: HOSPADM

## 2020-09-09 RX ADMIN — VANCOMYCIN HYDROCHLORIDE 2750 MG: 500 INJECTION, POWDER, LYOPHILIZED, FOR SOLUTION INTRAVENOUS at 21:48

## 2020-09-09 RX ADMIN — SODIUM CHLORIDE 3 G: 900 INJECTION INTRAVENOUS at 21:15

## 2020-09-09 ASSESSMENT — ENCOUNTER SYMPTOMS
DIZZINESS: 0
SEIZURES: 0
BLURRED VISION: 0
BRUISES/BLEEDS EASILY: 0
FOCAL WEAKNESS: 0
NECK PAIN: 0
WHEEZING: 0
BACK PAIN: 0
BLOOD IN STOOL: 0
FLANK PAIN: 0
SPUTUM PRODUCTION: 0
SHORTNESS OF BREATH: 0
VOMITING: 0
CHILLS: 1
NAUSEA: 0
HEADACHES: 0
FEVER: 0
ABDOMINAL PAIN: 0
DIARRHEA: 0
DIAPHORESIS: 0
COUGH: 0
MYALGIAS: 0
SORE THROAT: 0
PALPITATIONS: 0

## 2020-09-09 NOTE — ED TRIAGE NOTES
Chief Complaint   Patient presents with   • Chills     x1 month   • Generalized Body Aches     x1 month; also c/o joint pain   • Breast Pain     x1 month; pt states she has a lump in her right breast from her child jumping on it and she took a round of antibiotics for it (finished 5 days ago)   • Low Back Pain     bilateral low back pain x4 days   • Urinary Frequency     x2 weeks     Pt brought in by EMS from home and ambulatory to triage for above complaint. Pt states she has been multiple ERs (6 visits) over the last month for the same thing.     Pt is alert/oriented and follows commands. Pt speaking in full sentences and responds appropriately to questions. No acute distress noted in triage and respirations are even and unlabored.     Pt placed in lobby and educated on triage process. Pt encouraged to alert staff for any changes in condition.

## 2020-09-10 ENCOUNTER — ANESTHESIA (OUTPATIENT)
Dept: SURGERY | Facility: MEDICAL CENTER | Age: 40
DRG: 584 | End: 2020-09-10
Payer: MEDICAID

## 2020-09-10 ENCOUNTER — ANESTHESIA EVENT (OUTPATIENT)
Dept: SURGERY | Facility: MEDICAL CENTER | Age: 40
DRG: 584 | End: 2020-09-10
Payer: MEDICAID

## 2020-09-10 PROBLEM — E66.9 OBESITY (BMI 30-39.9): Status: ACTIVE | Noted: 2020-09-10

## 2020-09-10 LAB
FOLATE SERPL-MCNC: 29.5 NG/ML
GRAM STN SPEC: NORMAL
IRON SATN MFR SERPL: 7 % (ref 15–55)
IRON SERPL-MCNC: 16 UG/DL (ref 40–170)
PATHOLOGY CONSULT NOTE: NORMAL
SIGNIFICANT IND 70042: NORMAL
SITE SITE: NORMAL
SOURCE SOURCE: NORMAL
TIBC SERPL-MCNC: 245 UG/DL (ref 250–450)
TSH SERPL DL<=0.005 MIU/L-ACNC: 0.6 UIU/ML (ref 0.38–5.33)
UIBC SERPL-MCNC: 229 UG/DL (ref 110–370)
VIT B12 SERPL-MCNC: 359 PG/ML (ref 211–911)

## 2020-09-10 PROCEDURE — 0HB5XZX EXCISION OF CHEST SKIN, EXTERNAL APPROACH, DIAGNOSTIC: ICD-10-PCS | Performed by: SURGERY

## 2020-09-10 PROCEDURE — 700111 HCHG RX REV CODE 636 W/ 250 OVERRIDE (IP): Performed by: INTERNAL MEDICINE

## 2020-09-10 PROCEDURE — 700111 HCHG RX REV CODE 636 W/ 250 OVERRIDE (IP): Performed by: ANESTHESIOLOGY

## 2020-09-10 PROCEDURE — 0H9T0ZZ DRAINAGE OF RIGHT BREAST, OPEN APPROACH: ICD-10-PCS | Performed by: SURGERY

## 2020-09-10 PROCEDURE — 160048 HCHG OR STATISTICAL LEVEL 1-5: Performed by: SURGERY

## 2020-09-10 PROCEDURE — 700111 HCHG RX REV CODE 636 W/ 250 OVERRIDE (IP): Performed by: HOSPITALIST

## 2020-09-10 PROCEDURE — 87075 CULTR BACTERIA EXCEPT BLOOD: CPT

## 2020-09-10 PROCEDURE — 160036 HCHG PACU - EA ADDL 30 MINS PHASE I: Performed by: SURGERY

## 2020-09-10 PROCEDURE — 160028 HCHG SURGERY MINUTES - 1ST 30 MINS LEVEL 3: Performed by: SURGERY

## 2020-09-10 PROCEDURE — 770006 HCHG ROOM/CARE - MED/SURG/GYN SEMI*

## 2020-09-10 PROCEDURE — 87205 SMEAR GRAM STAIN: CPT

## 2020-09-10 PROCEDURE — A6407 PACKING STRIPS, NON-IMPREG: HCPCS | Performed by: SURGERY

## 2020-09-10 PROCEDURE — 700105 HCHG RX REV CODE 258: Performed by: INTERNAL MEDICINE

## 2020-09-10 PROCEDURE — 160002 HCHG RECOVERY MINUTES (STAT): Performed by: SURGERY

## 2020-09-10 PROCEDURE — 160039 HCHG SURGERY MINUTES - EA ADDL 1 MIN LEVEL 3: Performed by: SURGERY

## 2020-09-10 PROCEDURE — 700102 HCHG RX REV CODE 250 W/ 637 OVERRIDE(OP): Performed by: ANESTHESIOLOGY

## 2020-09-10 PROCEDURE — A9270 NON-COVERED ITEM OR SERVICE: HCPCS | Performed by: INTERNAL MEDICINE

## 2020-09-10 PROCEDURE — 87070 CULTURE OTHR SPECIMN AEROBIC: CPT

## 2020-09-10 PROCEDURE — 700101 HCHG RX REV CODE 250: Performed by: ANESTHESIOLOGY

## 2020-09-10 PROCEDURE — 700102 HCHG RX REV CODE 250 W/ 637 OVERRIDE(OP): Performed by: INTERNAL MEDICINE

## 2020-09-10 PROCEDURE — 99232 SBSQ HOSP IP/OBS MODERATE 35: CPT | Performed by: HOSPITALIST

## 2020-09-10 PROCEDURE — A9270 NON-COVERED ITEM OR SERVICE: HCPCS | Performed by: ANESTHESIOLOGY

## 2020-09-10 PROCEDURE — 160035 HCHG PACU - 1ST 60 MINS PHASE I: Performed by: SURGERY

## 2020-09-10 PROCEDURE — 700101 HCHG RX REV CODE 250: Performed by: SURGERY

## 2020-09-10 PROCEDURE — 88305 TISSUE EXAM BY PATHOLOGIST: CPT

## 2020-09-10 PROCEDURE — 160009 HCHG ANES TIME/MIN: Performed by: SURGERY

## 2020-09-10 RX ORDER — METOPROLOL TARTRATE 1 MG/ML
1 INJECTION, SOLUTION INTRAVENOUS
Status: DISCONTINUED | OUTPATIENT
Start: 2020-09-10 | End: 2020-09-10 | Stop reason: HOSPADM

## 2020-09-10 RX ORDER — MAGNESIUM HYDROXIDE 1200 MG/15ML
LIQUID ORAL
Status: COMPLETED | OUTPATIENT
Start: 2020-09-10 | End: 2020-09-10

## 2020-09-10 RX ORDER — LIDOCAINE HYDROCHLORIDE 20 MG/ML
INJECTION, SOLUTION EPIDURAL; INFILTRATION; INTRACAUDAL; PERINEURAL PRN
Status: DISCONTINUED | OUTPATIENT
Start: 2020-09-10 | End: 2020-09-10 | Stop reason: SURG

## 2020-09-10 RX ORDER — ONDANSETRON 2 MG/ML
4 INJECTION INTRAMUSCULAR; INTRAVENOUS
Status: DISCONTINUED | OUTPATIENT
Start: 2020-09-10 | End: 2020-09-10 | Stop reason: HOSPADM

## 2020-09-10 RX ORDER — LABETALOL HYDROCHLORIDE 5 MG/ML
5 INJECTION, SOLUTION INTRAVENOUS
Status: DISCONTINUED | OUTPATIENT
Start: 2020-09-10 | End: 2020-09-10 | Stop reason: HOSPADM

## 2020-09-10 RX ORDER — HALOPERIDOL 5 MG/ML
1 INJECTION INTRAMUSCULAR
Status: DISCONTINUED | OUTPATIENT
Start: 2020-09-10 | End: 2020-09-10 | Stop reason: HOSPADM

## 2020-09-10 RX ORDER — DIPHENHYDRAMINE HYDROCHLORIDE 50 MG/ML
12.5 INJECTION INTRAMUSCULAR; INTRAVENOUS
Status: DISCONTINUED | OUTPATIENT
Start: 2020-09-10 | End: 2020-09-10 | Stop reason: HOSPADM

## 2020-09-10 RX ORDER — BUPIVACAINE HYDROCHLORIDE AND EPINEPHRINE 5; 5 MG/ML; UG/ML
INJECTION, SOLUTION EPIDURAL; INTRACAUDAL; PERINEURAL
Status: DISCONTINUED | OUTPATIENT
Start: 2020-09-10 | End: 2020-09-10 | Stop reason: HOSPADM

## 2020-09-10 RX ORDER — HYDROMORPHONE HYDROCHLORIDE 1 MG/ML
0.1 INJECTION, SOLUTION INTRAMUSCULAR; INTRAVENOUS; SUBCUTANEOUS
Status: DISCONTINUED | OUTPATIENT
Start: 2020-09-10 | End: 2020-09-10 | Stop reason: HOSPADM

## 2020-09-10 RX ORDER — HYDROMORPHONE HYDROCHLORIDE 1 MG/ML
0.2 INJECTION, SOLUTION INTRAMUSCULAR; INTRAVENOUS; SUBCUTANEOUS
Status: DISCONTINUED | OUTPATIENT
Start: 2020-09-10 | End: 2020-09-10 | Stop reason: HOSPADM

## 2020-09-10 RX ORDER — KETOROLAC TROMETHAMINE 30 MG/ML
INJECTION, SOLUTION INTRAMUSCULAR; INTRAVENOUS PRN
Status: DISCONTINUED | OUTPATIENT
Start: 2020-09-10 | End: 2020-09-10 | Stop reason: SURG

## 2020-09-10 RX ORDER — SODIUM CHLORIDE 9 MG/ML
INJECTION, SOLUTION INTRAVENOUS CONTINUOUS
Status: DISCONTINUED | OUTPATIENT
Start: 2020-09-10 | End: 2020-09-10 | Stop reason: HOSPADM

## 2020-09-10 RX ORDER — ONDANSETRON 2 MG/ML
INJECTION INTRAMUSCULAR; INTRAVENOUS PRN
Status: DISCONTINUED | OUTPATIENT
Start: 2020-09-10 | End: 2020-09-10 | Stop reason: SURG

## 2020-09-10 RX ORDER — MIDAZOLAM HYDROCHLORIDE 1 MG/ML
INJECTION INTRAMUSCULAR; INTRAVENOUS PRN
Status: DISCONTINUED | OUTPATIENT
Start: 2020-09-10 | End: 2020-09-10 | Stop reason: SURG

## 2020-09-10 RX ORDER — HYDROMORPHONE HYDROCHLORIDE 1 MG/ML
0.4 INJECTION, SOLUTION INTRAMUSCULAR; INTRAVENOUS; SUBCUTANEOUS
Status: DISCONTINUED | OUTPATIENT
Start: 2020-09-10 | End: 2020-09-10 | Stop reason: HOSPADM

## 2020-09-10 RX ADMIN — SODIUM CHLORIDE 3 G: 900 INJECTION INTRAVENOUS at 02:58

## 2020-09-10 RX ADMIN — VANCOMYCIN HYDROCHLORIDE 1250 MG: 500 INJECTION, POWDER, LYOPHILIZED, FOR SOLUTION INTRAVENOUS at 21:40

## 2020-09-10 RX ADMIN — MIDAZOLAM HYDROCHLORIDE 2 MG: 1 INJECTION, SOLUTION INTRAMUSCULAR; INTRAVENOUS at 07:15

## 2020-09-10 RX ADMIN — ONDANSETRON 4 MG: 4 TABLET, ORALLY DISINTEGRATING ORAL at 12:06

## 2020-09-10 RX ADMIN — SODIUM CHLORIDE: 9 INJECTION, SOLUTION INTRAVENOUS at 12:07

## 2020-09-10 RX ADMIN — LIDOCAINE HYDROCHLORIDE 100 MG: 20 INJECTION, SOLUTION EPIDURAL; INFILTRATION; INTRACAUDAL at 07:22

## 2020-09-10 RX ADMIN — ONDANSETRON 4 MG: 2 INJECTION INTRAMUSCULAR; INTRAVENOUS at 07:38

## 2020-09-10 RX ADMIN — FENTANYL CITRATE 50 MCG: 50 INJECTION INTRAMUSCULAR; INTRAVENOUS at 07:34

## 2020-09-10 RX ADMIN — DOCUSATE SODIUM 50 MG AND SENNOSIDES 8.6 MG 2 TABLET: 8.6; 5 TABLET, FILM COATED ORAL at 17:31

## 2020-09-10 RX ADMIN — FENTANYL CITRATE 100 MCG: 50 INJECTION INTRAMUSCULAR; INTRAVENOUS at 07:15

## 2020-09-10 RX ADMIN — ACETAMINOPHEN 650 MG: 325 TABLET, FILM COATED ORAL at 19:15

## 2020-09-10 RX ADMIN — FENTANYL CITRATE 25 MCG: 50 INJECTION INTRAMUSCULAR; INTRAVENOUS at 10:08

## 2020-09-10 RX ADMIN — VANCOMYCIN HYDROCHLORIDE 1250 MG: 500 INJECTION, POWDER, LYOPHILIZED, FOR SOLUTION INTRAVENOUS at 06:14

## 2020-09-10 RX ADMIN — OXYCODONE HYDROCHLORIDE 10 MG: 10 TABLET ORAL at 20:19

## 2020-09-10 RX ADMIN — HYDROCODONE BITARTRATE AND ACETAMINOPHEN 15 ML: 7.5; 325 SOLUTION ORAL at 08:15

## 2020-09-10 RX ADMIN — SODIUM CHLORIDE 3 G: 900 INJECTION INTRAVENOUS at 12:06

## 2020-09-10 RX ADMIN — VANCOMYCIN HYDROCHLORIDE 1250 MG: 500 INJECTION, POWDER, LYOPHILIZED, FOR SOLUTION INTRAVENOUS at 13:46

## 2020-09-10 RX ADMIN — ENOXAPARIN SODIUM 40 MG: 40 INJECTION SUBCUTANEOUS at 17:35

## 2020-09-10 RX ADMIN — PROPOFOL 140 MG: 10 INJECTION, EMULSION INTRAVENOUS at 07:23

## 2020-09-10 RX ADMIN — SODIUM CHLORIDE: 9 INJECTION, SOLUTION INTRAVENOUS at 01:20

## 2020-09-10 RX ADMIN — SODIUM CHLORIDE 3 G: 900 INJECTION INTRAVENOUS at 17:31

## 2020-09-10 RX ADMIN — KETOROLAC TROMETHAMINE 30 MG: 30 INJECTION, SOLUTION INTRAMUSCULAR at 07:36

## 2020-09-10 RX ADMIN — FENTANYL CITRATE 25 MCG: 50 INJECTION INTRAMUSCULAR; INTRAVENOUS at 08:51

## 2020-09-10 ASSESSMENT — PAIN DESCRIPTION - PAIN TYPE
TYPE: SURGICAL PAIN
TYPE: ACUTE PAIN
TYPE: SURGICAL PAIN

## 2020-09-10 ASSESSMENT — COGNITIVE AND FUNCTIONAL STATUS - GENERAL
SUGGESTED CMS G CODE MODIFIER DAILY ACTIVITY: CH
DAILY ACTIVITIY SCORE: 24
SUGGESTED CMS G CODE MODIFIER MOBILITY: CH
MOBILITY SCORE: 24

## 2020-09-10 ASSESSMENT — LIFESTYLE VARIABLES
ON A TYPICAL DAY WHEN YOU DRINK ALCOHOL HOW MANY DRINKS DO YOU HAVE: 0
ALCOHOL_USE: NO
CONSUMPTION TOTAL: NEGATIVE
TOTAL SCORE: 0
AVERAGE NUMBER OF DAYS PER WEEK YOU HAVE A DRINK CONTAINING ALCOHOL: 0
EVER HAD A DRINK FIRST THING IN THE MORNING TO STEADY YOUR NERVES TO GET RID OF A HANGOVER: NO
HAVE PEOPLE ANNOYED YOU BY CRITICIZING YOUR DRINKING: NO
TOTAL SCORE: 0
HOW MANY TIMES IN THE PAST YEAR HAVE YOU HAD 5 OR MORE DRINKS IN A DAY: 0
EVER FELT BAD OR GUILTY ABOUT YOUR DRINKING: NO
TOTAL SCORE: 0
HAVE YOU EVER FELT YOU SHOULD CUT DOWN ON YOUR DRINKING: NO

## 2020-09-10 ASSESSMENT — ENCOUNTER SYMPTOMS
SHORTNESS OF BREATH: 0
NAUSEA: 1
COUGH: 0
DIZZINESS: 0
VOMITING: 0
MYALGIAS: 1
EYE DISCHARGE: 0
SPEECH CHANGE: 0
DIARRHEA: 0
SORE THROAT: 0
FEVER: 0
CHILLS: 0
BACK PAIN: 0
NERVOUS/ANXIOUS: 0
ABDOMINAL PAIN: 0
PALPITATIONS: 0

## 2020-09-10 ASSESSMENT — COPD QUESTIONNAIRES
DURING THE PAST 4 WEEKS HOW MUCH DID YOU FEEL SHORT OF BREATH: NONE/LITTLE OF THE TIME
DO YOU EVER COUGH UP ANY MUCUS OR PHLEGM?: NO/ONLY WITH OCCASIONAL COLDS OR INFECTIONS
COPD SCREENING SCORE: 0
HAVE YOU SMOKED AT LEAST 100 CIGARETTES IN YOUR ENTIRE LIFE: NO/DON'T KNOW

## 2020-09-10 ASSESSMENT — PAIN SCALES - GENERAL: PAIN_LEVEL: 0

## 2020-09-10 ASSESSMENT — PATIENT HEALTH QUESTIONNAIRE - PHQ9
2. FEELING DOWN, DEPRESSED, IRRITABLE, OR HOPELESS: NOT AT ALL
SUM OF ALL RESPONSES TO PHQ9 QUESTIONS 1 AND 2: 0
1. LITTLE INTEREST OR PLEASURE IN DOING THINGS: NOT AT ALL

## 2020-09-10 NOTE — PROGRESS NOTES
2 RN skin check complete.  R breast dressing CDI.  Callous to lateral R 3rd finger.  Bruise to LFA.  All other skin intact.  Patient ambulatory. SCDs,  and O2 with grey foam in place.  [place] light within reach, hourly rounding.

## 2020-09-10 NOTE — ASSESSMENT & PLAN NOTE
Body mass index is 34.54 kg/m².  Encourage weight loss and lifestyle modifications to aid in cardiovascular protection

## 2020-09-10 NOTE — OR SURGEON
Immediate Post OP Note    PreOp Diagnosis: right breast abscess    PostOp Diagnosis: right breast abscess    Procedure(s):  IRRIGATION AND DEBRIDEMENT, WOUND, biopsy    Wound class: dirty infected    Surgeon(s):  Netta Blanca M.D.    Anesthesiologist/Type of Anesthesia:  Anesthesiologist: Justin Tamez M.D./* No anesthesia type entered *    Surgical Staff:  Circulator: Caryl Corbin R.N.  Scrub Person: Nelly Bender    Specimens removed if any:  * No specimens in log *    Estimated Blood Loss: 20mL    Findings: Non malodorous pus in cavity, otherwise normal appearing breast tissue    Complications: none apparent    Dictation: [id number]    Wound care orders placed - BID wet to dry dressing changes  Await culture and sensitivities      9/10/2020 7:46 AM Netta Blanca M.D.

## 2020-09-10 NOTE — OR NURSING
Pt slightly hypotensive but reports pain. She is aaox4, denies dizziness, n/v, chest pain at this time. She is provided oral fluids upon request and requesting food. Diet order regular.

## 2020-09-10 NOTE — ASSESSMENT & PLAN NOTE
Started on IV vancomycin and IV Unasyn.  Monitor for vancomycin toxicity and follow trough levels  S/p 9/10 early am I&D of breast abscess.  On my 9/10 evaluation with female nurse at bedside, open drain surgical incision ~3-4cm in length.  No surrounding skin erythema.  Monitor vitals and CBC  Following up on cultures

## 2020-09-10 NOTE — PROGRESS NOTES
Pharmacy Kinetics Addendum:    40 y.o. female on vancomycin day # 1 9/10/2020    Currently on Vancomycin 1250 mg iv q8hr (0600, 1400, 2200)    Indication for Treatment: right breast abscess    Recent Labs     09/09/20  1815   BUN 9   CREATININE 0.70   ALBUMIN 4.1     No results for input(s): VANCOTROUGH, VANCOPEAK, VANCORANDOM in the last 72 hours.    A/P   1. Vancomycin dose change: Not indicated at this time  2. Next vancomycin level: 9/11/20 @ 0530 (ordered  3. Goal trough: 10-15 mcg/mL  4. Comments: Patient underwent I and D. No h/o vancomycin usage in Epic. BMP and vancomycin trough ordered for AM. Pharmacy will continue to monitor.    Jesika Adorno, NikaD, BCPS

## 2020-09-10 NOTE — ED PROVIDER NOTES
"ED Provider Note    CHIEF COMPLAINT  Chills, generalized body aches and arthralgias, right breast lump    HPI  Suellen Barbosa is a 40 y.o. female who presents to the emergency department for multiple complaints.  The patient states that she has had an approximately 1 month long history of intermittent chills as well as generalized body aches and arthralgias.  She states that she is currently on steroids for the arthralgias which have been helpful.  She states that approximate 1 month ago, she was hospitalized at [facility] for an anemia for which she required a blood transfusion.  She was also given IV antibiotics for a presumed right breast infection.  She was then sent home on a two-week course of doxycycline which she finished.  She still had some right breast redness and pain so she was given an additional 7-day course of Bactrim which she has since finished.  She states that she feels like the breast is worse today and she feels like it is \"infected\" again.  She denies any discharge from the nipple.  She denies any fevers but does admit to feeling very cold.  She denies any runny nose, cough, congestion, chest pain but does admit to some intermittent shortness of breath.  She attributes this to her anemia.  She also admits to some dysuria but denies any hematuria.  She denies any melena or hematochezia.  She does admit to history of heavy periods for which she is scheduled to have an ablation for.    REVIEW OF SYSTEMS  See HPI for further details. All other systems are negative.     PAST MEDICAL HISTORY   has a past medical history of Anemia.    SOCIAL HISTORY  Social History     Tobacco Use   • Smoking status: Never Smoker   • Smokeless tobacco: Never Used   Substance and Sexual Activity   • Alcohol use: Never     Frequency: Never   • Drug use: Never   • Sexual activity: Not on file       SURGICAL HISTORY   has a past surgical history that includes primary c section.    CURRENT MEDICATIONS  Home " "Medications     Reviewed by Evgeny Pool (Pharmacy Tech) on 09/09/20 at 2136  Med List Status: Complete   Medication Last Dose Status   Diclofenac Sodium 1 % Gel 9/9/2020 Active   ferrous sulfate 325 (65 Fe) MG tablet 9/9/2020 Active   folic acid (FOLVITE) 1 MG Tab 9/9/2020 Active   IRON CHEWS PEDIATRIC 15 MG Chew Tab 9/9/2020 Active   methylPREDNISolone (MEDROL DOSEPAK) 4 MG Tablet Therapy Pack 9/9/2020 Active   norethindrone (AYGESTIN) 5 MG tablet 9/9/2020 Active   sulfamethoxazole-trimethoprim (BACTRIM DS) 800-160 MG tablet 09/02/2020 Active              ALLERGIES  No Known Allergies    PHYSICAL EXAM  VITAL SIGNS: BP (!) 97/55   Pulse 63   Temp 36 °C (96.8 °F) (Temporal)   Resp 16   Ht 1.753 m (5' 9\")   Wt 106.1 kg (233 lb 14.5 oz)   LMP 08/26/2020 (Approximate)   SpO2 97%   BMI 34.54 kg/m²    Constitutional: Alert and in no apparent distress.  Obese female.  HENT: Normocephalic atraumatic. Bilateral external ears normal. Nose normal. Mucous membranes are dry.  Eyes: Pupils are equal and reactive. Conjunctiva normal. Non-icteric sclera.   Neck: Normal range of motion without tenderness. Supple. No meningeal signs.  Cardiovascular: Regular rate and rhythm. No murmurs, gallops or rubs.  Thorax & Lungs: Breath sounds are clear to auscultation bilaterally. No wheezing, rhonchi or rales.  Breast: Mary - Jennifer, ED RN.  There is an 8cm x 4cm oval-shaped mass at approximately 2:00 on the right breast.  There is some overlying erythema.  No obvious areas of fluctuance are noted.  No nipple discharge is noted.  Abdomen: Soft, nontender and nondistended. No peritoneal signs noted.  Skin: Warm and dry. No rashes are noted.  Back: No bony tenderness, No CVA tenderness.   Extremities: 2+ peripheral pulses. Cap refill is less than 2 seconds. No edema, cyanosis, or clubbing.  Musculoskeletal: Good range of motion in all major joints. No tenderness to palpation or major deformities noted.   Neurologic: Alert " and oriented ×3. The patient moves all 4 extremities and follows commands.  Psychiatric: Affect is normal. Judgment appears to be intact.    DIAGNOSTIC STUDIES / PROCEDURES    LABS  Results for orders placed or performed during the hospital encounter of 09/09/20   CRP QUANTITIVE (NON-CARDIAC)   Result Value Ref Range    Stat C-Reactive Protein 3.20 (H) 0.00 - 0.75 mg/dL   Sed Rate   Result Value Ref Range    Sed Rate Westergren 35 (H) 0 - 20 mm/hour   CBC WITH DIFFERENTIAL   Result Value Ref Range    WBC 10.8 4.8 - 10.8 K/uL    RBC 3.98 (L) 4.20 - 5.40 M/uL    Hemoglobin 9.9 (L) 12.0 - 16.0 g/dL    Hematocrit 33.0 (L) 37.0 - 47.0 %    MCV 82.9 81.4 - 97.8 fL    MCH 24.9 (L) 27.0 - 33.0 pg    MCHC 30.0 (L) 33.6 - 35.0 g/dL    RDW 61.0 (H) 35.9 - 50.0 fL    Platelet Count 443 164 - 446 K/uL    MPV 8.2 (L) 9.0 - 12.9 fL    Neutrophils-Polys 86.00 (H) 44.00 - 72.00 %    Lymphocytes 10.40 (L) 22.00 - 41.00 %    Monocytes 2.90 0.00 - 13.40 %    Eosinophils 0.10 0.00 - 6.90 %    Basophils 0.30 0.00 - 1.80 %    Immature Granulocytes 0.30 0.00 - 0.90 %    Nucleated RBC 0.00 /100 WBC    Neutrophils (Absolute) 9.26 (H) 2.00 - 7.15 K/uL    Lymphs (Absolute) 1.12 1.00 - 4.80 K/uL    Monos (Absolute) 0.31 0.00 - 0.85 K/uL    Eos (Absolute) 0.01 0.00 - 0.51 K/uL    Baso (Absolute) 0.03 0.00 - 0.12 K/uL    Immature Granulocytes (abs) 0.03 0.00 - 0.11 K/uL    NRBC (Absolute) 0.00 K/uL   COMP METABOLIC PANEL   Result Value Ref Range    Sodium 133 (L) 135 - 145 mmol/L    Potassium 4.4 3.6 - 5.5 mmol/L    Chloride 98 96 - 112 mmol/L    Co2 22 20 - 33 mmol/L    Anion Gap 13.0 7.0 - 16.0    Glucose 105 (H) 65 - 99 mg/dL    Bun 9 8 - 22 mg/dL    Creatinine 0.70 0.50 - 1.40 mg/dL    Calcium 9.5 8.5 - 10.5 mg/dL    AST(SGOT) 20 12 - 45 U/L    ALT(SGPT) 33 2 - 50 U/L    Alkaline Phosphatase 82 30 - 99 U/L    Total Bilirubin 0.2 0.1 - 1.5 mg/dL    Albumin 4.1 3.2 - 4.9 g/dL    Total Protein 7.5 6.0 - 8.2 g/dL    Globulin 3.4 1.9 - 3.5 g/dL     A-G Ratio 1.2 g/dL   LIPASE   Result Value Ref Range    Lipase 26 11 - 82 U/L   TROPONIN   Result Value Ref Range    Troponin T <6 6 - 19 ng/L   LACTIC ACID   Result Value Ref Range    Lactic Acid 1.3 0.5 - 2.0 mmol/L   URINALYSIS CULTURE, IF INDICATED    Specimen: Urine   Result Value Ref Range    Color Yellow     Character Clear     Specific Gravity 1.015 <1.035    Ph 6.0 5.0 - 8.0    Glucose Negative Negative mg/dL    Ketones Negative Negative mg/dL    Protein Negative Negative mg/dL    Bilirubin Negative Negative    Urobilinogen, Urine 0.2 Negative    Nitrite Negative Negative    Leukocyte Esterase Negative Negative    Occult Blood Negative Negative    Micro Urine Req see below    HCG QUAL SERUM   Result Value Ref Range    Beta-Hcg Qualitative Serum Negative Negative   TSH   Result Value Ref Range    TSH 0.618 0.380 - 5.330 uIU/mL   ESTIMATED GFR   Result Value Ref Range    GFR If African American >60 >60 mL/min/1.73 m 2    GFR If Non African American >60 >60 mL/min/1.73 m 2   COVID/SARS CoV-2 PCR    Specimen: Nasopharyngeal; Respirate   Result Value Ref Range    COVID Order Status Received    SARS-CoV-2, PCR (In-House)   Result Value Ref Range    SARS-CoV-2 Source NP Swab     SARS-CoV-2 by PCR NotDetected    IRON/TOTAL IRON BIND   Result Value Ref Range    Iron 16 (L) 40 - 170 ug/dL    Total Iron Binding 245 (L) 250 - 450 ug/dL    Unsat Iron Binding 229 110 - 370 ug/dL    % Saturation 7 (L) 15 - 55 %   VITAMIN B12   Result Value Ref Range    Vitamin B12 -True Cobalamin 359 211 - 911 pg/mL   TSH WITH REFLEX TO FT4   Result Value Ref Range    TSH 0.603 0.380 - 5.330 uIU/mL   EKG (NOW)   Result Value Ref Range    Report       [facility] Emergency Dept.    Test Date:  2020  Pt Name:    ARDEN ROWE               Department: ER  MRN:        [id number]                      Room:        59  Gender:     Female                       Technician: [id number]  :        1980                    Requested By:REGI BEE  Order #:    [id number]                    Reading MD: Regi Bee    Measurements  Intervals                                Axis  Rate:       65                           P:          22  KS:         132                          QRS:        15  QRSD:       66                           T:          -6  QT:         408  QTc:        425    Interpretive Statements  SINUS RHYTHM  No ST elevation or depression noted.  Intervals are within normal limits.  Axis  is normal.  No arrhythmias are noted.  No previous ECG available for comparison  Electronically Signed On 9-9-2020 18:29:55 PDT by Regi Bee       RADIOLOGY  DX-CHEST-PORTABLE (1 VIEW)   Final Result      No acute cardiac or pulmonary abnormalities are identified.      US-CHEST   Final Result      Amorphous echo collection containing sonolucent components and low level echo components with margins difficult to define. Measurement of the largest sonolucent component is 6.1 x 1.3 x 3.3 cm. In the appropriate clinical setting, these findings could be    compatible with breast abscess.      Patient is 40 years old and has no prior breast imaging for comparison. Recommend diagnostic mammogram and breast ultrasound for follow-up evaluation.        COURSE & MEDICAL DECISION MAKING  Pertinent Labs & Imaging studies reviewed. (See chart for details)    This is a 40-year-old female presenting to the emergency department for multiple complaints including a right breast mass.  On my initial evaluation, the patient appeared well and in no acute distress.  Her vital signs were normal.  Physical exam was notable for a large mass in the right breast with some overlying erythema.  Her white blood cell count and lactic acid were normal and she has no history of fever.  I have very low clinical suspicion for sepsis at this time.  I did obtain an ultrasound-year-old findings concerning for abscess.     The remainder of her labs were notable for anemia which is  known.  Her ESR and sed rate were also mildly elevated.  Blood cultures were pending.  Electrolytes were reassuring.  I did obtain an EKG and troponin which did not reveal any evidence of ischemia.  I suspect her dyspnea is likely secondary to her anemia.  Chest x-ray was clear with no evidence of focal opacities concerning for pneumonia, pneumothorax, pulmonary edema, or pleural effusions.    8:57 PM - I discussed the case with Dr. Blanca, surgery. She recommended admitting to the hospitalist service and will plan to take the patient to the OR in the morning.  She agreed with the plan for IV antibiotics at this time.  The patient was started on vancomycin and Unasyn given her failed outpatient treatment.    9:50 PM - I discussed the case with Dr. Morataya, hospitalist.  He agreed with the plan accepted the patient.    I verified that the patient was wearing a mask and I was wearing appropriate PPE every time I entered the room. The patient's mask was on the patient at all times during my encounter except for a brief view of the oropharynx.    FINAL IMPRESSION  1. Breast abscess    2. Elevated C-reactive protein (CRP)    3. Elevated sedimentation rate    4. Anemia, unspecified type      -ADMIT-    Electronically signed by: Regi Kumar D.O., 9/9/2020 5:58 PM

## 2020-09-10 NOTE — OR NURSING
Pt awake and alert at this time, tolerating pain. Pt's family updated on status with questions answered. Will continue to monitor.

## 2020-09-10 NOTE — ANESTHESIA TIME REPORT
Anesthesia Start and Stop Event Times     Date Time Event    9/10/2020 0711 Ready for Procedure     0714 Anesthesia Start     0752 Anesthesia Stop        Responsible Staff  09/10/20    Name Role Begin End    Justin Tamez M.D. Anesth 0714 0752        Preop Diagnosis (Free Text):  Pre-op Diagnosis     Right breast abscess        Preop Diagnosis (Codes):    Post op Diagnosis  Abscess of right breast      Premium Reason  Non-Premium    Comments:

## 2020-09-10 NOTE — PROGRESS NOTES
"Pharmacy Kinetics 40 y.o. female on vancomycin day # 1 9/10/2020    Received loading dose: Vancomycin 2750 mg IV once  Provider specified end date: none    Indication for Treatment: right breast abscess    Pertinent history per medical record: Admitted on 2020 for breast pain for the last month. Pt developed a lump in her right breast after her child jumped on it. She finished a 5 day course of antibiotics with no relief. Pt also reports bilateral lower back pain and urinary frequency x2 weeks.    Other antibiotics: ampicillin-sulbactam 3 g q6h    Allergies: Patient has no known allergies.     Concerns for renal function: obesity    Pertinent cultures to date:   : blood cultures x2 - in process      Recent Labs     20  1815   WBC 10.8   NEUTSPOLYS 86.00*     Recent Labs     20  1815   BUN 9   CREATININE 0.70   ALBUMIN 4.1     BP (!) 98/55   Pulse 64   Temp 36 °C (96.8 °F) (Temporal)   Resp 12   Ht 1.753 m (5' 9\")   Wt 106.1 kg (233 lb 14.5 oz)   SpO2 94%  Temp (24hrs), Av °C (96.8 °F), Min:36 °C (96.8 °F), Max:36 °C (96.8 °F)    A/P   1. Maintenance dose: 1250 mg q8h (12 mg/kg)  2. Next vancomycin level: draw a trough prior to the 3rd dose (not yet ordered)  3. Goal trough: 10-15 mcg/ml  4. Comments: Renal function appropriate for patients age. Will draw a trough prior to the 3rd dose a adjust as needed. Will continue to follow for culture results and de-escalation as appropriate.    Ivy Adams, PharmD      "

## 2020-09-10 NOTE — ANESTHESIA POSTPROCEDURE EVALUATION
Patient: Suellen Barbosa    Procedure Summary     Date: 09/10/20 Room / Location: [facility] 09 / SURGERY [facility]    Anesthesia Start: 0714 Anesthesia Stop:     Procedure: IRRIGATION AND DEBRIDEMENT, WOUND (Right Breast) Diagnosis: (Right breast abscess)    Surgeon: Netta Blanca M.D. Responsible Provider: Justin Tamez M.D.    Anesthesia Type: general ASA Status: 2 - Emergent          Final Anesthesia Type: general  Last vitals  BP   Blood Pressure: (!) 99/56    Temp   36.2 °C (97.1 °F)    Pulse   Pulse: 60   Resp   (!) 11    SpO2   97 %      Anesthesia Post Evaluation    Patient location during evaluation: PACU  Patient participation: complete - patient participated  Level of consciousness: awake  Pain score: 0    Airway patency: patent  Anesthetic complications: no  Cardiovascular status: hemodynamically stable  Respiratory status: acceptable  Hydration status: euvolemic    PONV: none

## 2020-09-10 NOTE — CARE PLAN
Problem: Communication  Goal: The ability to communicate needs accurately and effectively will improve  Outcome: PROGRESSING AS EXPECTED     Problem: Safety  Goal: Will remain free from injury  Outcome: PROGRESSING AS EXPECTED  Goal: Will remain free from falls  Outcome: PROGRESSING AS EXPECTED     Problem: Knowledge Deficit  Goal: Knowledge of disease process/condition, treatment plan, diagnostic tests, and medications will improve  Outcome: PROGRESSING AS EXPECTED     Problem: Discharge Barriers/Planning  Goal: Patient's continuum of care needs will be met  Outcome: PROGRESSING AS EXPECTED     Problem: Pain Management  Goal: Pain level will decrease to patient's comfort goal  Outcome: PROGRESSING AS EXPECTED     Problem: Urinary Elimination:  Goal: Ability to reestablish a normal urinary elimination pattern will improve  Outcome: PROGRESSING AS EXPECTED

## 2020-09-10 NOTE — ANESTHESIA PREPROCEDURE EVALUATION
Relevant Problems   Other   (+) Anemia   (+) Breast abscess   (+) Obesity (BMI 30-39.9)       Physical Exam    Airway   Mallampati: II  TM distance: >3 FB  Neck ROM: full       Cardiovascular - normal exam  Rhythm: regular  Rate: normal  (-) murmur  Comments: By palpation of radial artery   Dental - normal exam           Pulmonary   Comments: No concerns   Abdominal   (+) obese     Neurological     Comments: A&Ox4, grossly intact             Anesthesia Plan    ASA 2- EMERGENT (breast abcess)   ASA physical status emergent criteria: other (comment)    Plan - general       Airway plan will be LMA        Induction: intravenous    Postoperative Plan: Postoperative administration of opioids is intended.    Pertinent diagnostic labs and testing reviewed    Informed Consent:    Anesthetic plan and risks discussed with patient.    Use of blood products discussed with: patient whom consented to blood products.

## 2020-09-10 NOTE — H&P
Hospital Medicine History & Physical Note    Date of Service  9/9/2020    Primary Care Physician  Pcp Pt States None    Consultants  Surgery Dr. Garcia    Code Status  Full Code    Chief Complaint  Chief Complaint   Patient presents with   • Chills     x1 month   • Generalized Body Aches     x1 month; also c/o joint pain   • Breast Pain     x1 month; pt states she has a lump in her right breast from her child jumping on it and she took a round of antibiotics for it (finished 5 days ago)   • Low Back Pain     bilateral low back pain x4 days   • Urinary Frequency     x2 weeks       History of Presenting Illness  40 y.o. female who presented 9/9/2020 with breast pain and swelling.  The patient developed right breast pain and redness for the past month.  She was hospitalized at [facility] approximately a month ago for anemia for which she she had a blood transfusion and was also given IV antibiotics for presumed right breast infection.  She was discharged on 2-week course of doxycycline which she completed however had persistent right breast tenderness and pain.  She was then given a 7-day course of Bactrim which she completed however her symptoms persist and are worsening.  She also reports chills for the past month.  She denies any fevers, chest pain or shortness of breath.  In the ER she underwent an ultrasound of her right breast that revealed a 6 cm abscess.  Surgery Dr. dunlap was consulted.  The patient will be admitted for incision and drainage and treatment with IV antibiotics.    Review of Systems  Review of Systems   Constitutional: Positive for chills. Negative for diaphoresis and fever.   HENT: Negative for hearing loss and sore throat.    Eyes: Negative for blurred vision.   Respiratory: Negative for cough, sputum production, shortness of breath and wheezing.    Cardiovascular: Negative for chest pain, palpitations and leg swelling.   Gastrointestinal: Negative for abdominal pain,  blood in stool, diarrhea, nausea and vomiting.   Genitourinary: Negative for dysuria, flank pain and urgency.   Musculoskeletal: Negative for back pain, joint pain, myalgias and neck pain.   Skin: Negative for rash.        R breast infection   Neurological: Negative for dizziness, focal weakness, seizures and headaches.   Endo/Heme/Allergies: Does not bruise/bleed easily.   Psychiatric/Behavioral: Negative for suicidal ideas.   All other systems reviewed and are negative.      Past Medical History   has a past medical history of Anemia.    Surgical History   has a past surgical history that includes primary c section.     Family History  No pertinent family history    Social History   reports that she has never smoked. She has never used smokeless tobacco. She reports that she does not drink alcohol or use drugs.    Allergies  No Known Allergies    Medications  Prior to Admission Medications   Prescriptions Last Dose Informant Patient Reported? Taking?   Diclofenac Sodium 1 % Gel 9/9/2020 at 0930 Patient Yes Yes   Sig: Apply 1 Application to skin as directed every day.   IRON CHEWS PEDIATRIC 15 MG Chew Tab 9/9/2020 at 0930 Patient Yes No   ferrous sulfate 325 (65 Fe) MG tablet 9/9/2020 at 0930 Patient Yes No   folic acid (FOLVITE) 1 MG Tab 9/9/2020 at 0930 Patient Yes Yes   Sig: Take 1 mg by mouth every day.   methylPREDNISolone (MEDROL DOSEPAK) 4 MG Tablet Therapy Pack 9/9/2020 at 0930 Patient Yes Yes   Sig: Take 8 mg by mouth every day. Follow schedule on package instructions.   norethindrone (AYGESTIN) 5 MG tablet 9/9/2020 at 0930 Patient Yes Yes   Sig: Take 5 mg by mouth every day.   sulfamethoxazole-trimethoprim (BACTRIM DS) 800-160 MG tablet 09/02/2020 at [facility] Patient Yes Yes   Sig: Take 1 Tab by mouth 2 times a day.      Facility-Administered Medications: None       Physical Exam  Temp:  [36 °C (96.8 °F)] 36 °C (96.8 °F)  Pulse:  [70-92] 74  Resp:  [14-62] 19  BP: ()/(54-69) 110/61  SpO2:  [94 %-97  %] 96 %    Physical Exam  Vitals signs and nursing note reviewed.   Constitutional:       General: She is not in acute distress.     Appearance: Normal appearance.   HENT:      Head: Normocephalic and atraumatic.      Nose: Nose normal.      Mouth/Throat:      Mouth: Mucous membranes are moist.   Eyes:      Extraocular Movements: Extraocular movements intact.      Conjunctiva/sclera: Conjunctivae normal.      Pupils: Pupils are equal, round, and reactive to light.   Neck:      Musculoskeletal: Normal range of motion and neck supple.   Cardiovascular:      Rate and Rhythm: Normal rate and regular rhythm.      Pulses: Normal pulses.      Heart sounds: Normal heart sounds.   Pulmonary:      Effort: Pulmonary effort is normal. No respiratory distress.      Breath sounds: Normal breath sounds. No wheezing, rhonchi or rales.   Abdominal:      General: Bowel sounds are normal. There is no distension.      Palpations: Abdomen is soft.      Tenderness: There is no abdominal tenderness.   Musculoskeletal: Normal range of motion.         General: No swelling or tenderness.   Lymphadenopathy:      Cervical: No cervical adenopathy.   Skin:     General: Skin is warm.      Coloration: Skin is not jaundiced.      Findings: No rash.      Comments: R breast abscess   Neurological:      General: No focal deficit present.      Mental Status: She is alert and oriented to person, place, and time.      Cranial Nerves: No cranial nerve deficit.      Motor: No weakness.   Psychiatric:         Mood and Affect: Mood normal.         Behavior: Behavior normal.         Laboratory:  Recent Labs     09/09/20 1815   WBC 10.8   RBC 3.98*   HEMOGLOBIN 9.9*   HEMATOCRIT 33.0*   MCV 82.9   MCH 24.9*   MCHC 30.0*   RDW 61.0*   PLATELETCT 443   MPV 8.2*     Recent Labs     09/09/20  1815   SODIUM 133*   POTASSIUM 4.4   CHLORIDE 98   CO2 22   GLUCOSE 105*   BUN 9   CREATININE 0.70   CALCIUM 9.5     Recent Labs     09/09/20  1815   ALTSGPT 33   ASTSGOT 20    ALKPHOSPHAT 82   TBILIRUBIN 0.2   LIPASE 26   GLUCOSE 105*         No results for input(s): NTPROBNP in the last 72 hours.      Recent Labs     09/09/20  1815   TROPONINT <6       Imaging:  DX-CHEST-PORTABLE (1 VIEW)   Final Result      No acute cardiac or pulmonary abnormalities are identified.      US-CHEST   Final Result      Amorphous echo collection containing sonolucent components and low level echo components with margins difficult to define. Measurement of the largest sonolucent component is 6.1 x 1.3 x 3.3 cm. In the appropriate clinical setting, these findings could be    compatible with breast abscess.      Patient is 40 years old and has no prior breast imaging for comparison. Recommend diagnostic mammogram and breast ultrasound for follow-up evaluation.            Assessment/Plan:  I anticipate this patient will require at least two midnights for appropriate medical management, necessitating inpatient admission.    * Breast abscess- (present on admission)  Assessment & Plan  Started on IV vancomycin and IV Unasyn.  Monitor for vancomycin toxicity and follow trough levels  Surgery has been consulted for I&D, n.p.o. at midnight  Pain control with oxycodone      Hyponatremia- (present on admission)  Assessment & Plan  IV fluid hydration with normal saline  Monitor BMP and assess response      Anemia- (present on admission)  Assessment & Plan  Patient denies any melena  Check iron studies, folate, B12 and TSH  Monitor CBC, transfuse for hemoglobin less than 7

## 2020-09-10 NOTE — PROGRESS NOTES
[facility] Medicine Daily Progress Note    Date of Service  9/10/2020    Chief Complaint  General body aches, chills, right breast pain with a lump.    Hospital Course    40 y.o. female admitted 9/9/2020 with right breast abscess despite recent antibiotic treatment.  She is gone to surgery and had excision/drainage.  Await for microbiology.      Interval Problem Update  Status post incision and drainage of abscess of right breast.    Consultants/Specialty  Dr. Garcia    Code Status  Full Code    Disposition  Await final speciation from microbiology/cultures  Expect discharge to home    Review of Systems  Review of Systems   Constitutional: Negative for chills and fever.   HENT: Negative for sore throat.    Eyes: Negative for discharge.   Respiratory: Negative for cough and shortness of breath.    Cardiovascular: Negative for palpitations and leg swelling.   Gastrointestinal: Positive for nausea. Negative for abdominal pain, diarrhea and vomiting.   Genitourinary: Negative for dysuria.   Musculoskeletal: Positive for myalgias (right breast). Negative for back pain and joint pain.   Neurological: Negative for dizziness and speech change.   Psychiatric/Behavioral: The patient is not nervous/anxious.         Physical Exam  Temp:  [36 °C (96.8 °F)-36.7 °C (98.1 °F)] 36.1 °C (96.9 °F)  Pulse:  [58-92] 61  Resp:  [11-62] 18  BP: ()/(43-69) 93/56  SpO2:  [94 %-99 %] 98 %    Physical Exam  Vitals signs reviewed.   Constitutional:       Appearance: Normal appearance. She is obese. She is not diaphoretic.   HENT:      Head: Normocephalic and atraumatic.      Nose: Nose normal.      Mouth/Throat:      Mouth: Mucous membranes are moist.      Pharynx: No oropharyngeal exudate.   Eyes:      General: No scleral icterus.        Right eye: No discharge.         Left eye: No discharge.      Extraocular Movements: Extraocular movements intact.      Conjunctiva/sclera: Conjunctivae normal.   Neck:      Musculoskeletal: Normal  range of motion. No muscular tenderness.   Cardiovascular:      Rate and Rhythm: Normal rate and regular rhythm.      Pulses:           Radial pulses are 2+ on the right side and 2+ on the left side.        Dorsalis pedis pulses are 2+ on the right side and 2+ on the left side.      Heart sounds: No murmur.   Pulmonary:      Effort: Pulmonary effort is normal. No respiratory distress.      Breath sounds: Normal breath sounds. No wheezing or rales.   Abdominal:      General: Bowel sounds are normal. There is no distension.      Palpations: Abdomen is soft.      Tenderness: There is no abdominal tenderness.   Musculoskeletal:         General: No swelling or tenderness.   Skin:     Coloration: Skin is not jaundiced or pale.      Comments: Right anterior breast with surgical incision open with packing with some serosanginous fluid. No surrounding erythema.  Clear dressing reapplied over right breast.   Neurological:      General: No focal deficit present.      Mental Status: She is alert and oriented to person, place, and time. Mental status is at baseline.      Cranial Nerves: No cranial nerve deficit.   Psychiatric:         Mood and Affect: Mood normal.         Behavior: Behavior normal.         Fluids    Intake/Output Summary (Last 24 hours) at 9/10/2020 1242  Last data filed at 9/10/2020 0751  Gross per 24 hour   Intake 350 ml   Output 10 ml   Net 340 ml       Laboratory  Recent Labs     09/09/20  1815   WBC 10.8   RBC 3.98*   HEMOGLOBIN 9.9*   HEMATOCRIT 33.0*   MCV 82.9   MCH 24.9*   MCHC 30.0*   RDW 61.0*   PLATELETCT 443   MPV 8.2*     Recent Labs     09/09/20  1815   SODIUM 133*   POTASSIUM 4.4   CHLORIDE 98   CO2 22   GLUCOSE 105*   BUN 9   CREATININE 0.70   CALCIUM 9.5                   Imaging  DX-CHEST-PORTABLE (1 VIEW)   Final Result      No acute cardiac or pulmonary abnormalities are identified.      US-CHEST   Final Result      Amorphous echo collection containing sonolucent components and low level  echo components with margins difficult to define. Measurement of the largest sonolucent component is 6.1 x 1.3 x 3.3 cm. In the appropriate clinical setting, these findings could be    compatible with breast abscess.      Patient is 40 years old and has no prior breast imaging for comparison. Recommend diagnostic mammogram and breast ultrasound for follow-up evaluation.           Assessment/Plan  * Breast abscess- (present on admission)  Assessment & Plan  Started on IV vancomycin and IV Unasyn.  Monitor for vancomycin toxicity and follow trough levels  S/p 9/10 early am I&D of breast abscess.  On my 9/10 evaluation with female nurse at bedside, open drain surgical incision ~3-4cm in length.  No surrounding skin erythema.  Monitor vitals and CBC  Following up on cultures      Obesity (BMI 30-39.9)  Assessment & Plan  Body mass index is 34.54 kg/m².  Encourage weight loss and lifestyle modifications to aid in cardiovascular protection    Hyponatremia- (present on admission)  Assessment & Plan  Monitor BMP in am.  9/9 Initial Na:133      Anemia- (present on admission)  Assessment & Plan  Monitor CBC  Normal B12 and folate levels  Iron panel suggest chronic disease.       VTE prophylaxis: lovenox

## 2020-09-10 NOTE — OP REPORT
DATE OF SERVICE:  09/10/2020    PREOPERATIVE DIAGNOSIS:  Right breast abscess.    POSTOPERATIVE DIAGNOSIS:  Right breast abscess.    PROCEDURE:  Right breast abscess incision and debridement and biopsy.    WOUND CLASS:  Dirty, infected.    SURGEON:  Netta Blanca MD    ASSISTANT:  None.    ANESTHESIOLOGIST:  Justin Tamez MD    ANESTHESIA:  General with laryngeal mask airway.    SPECIMENS:  1.  Right breast abscess for culture.  2.  Random biopsy of abscess cavity for permanent.    ESTIMATED BLOOD LOSS:  20 mL.    FINDINGS:  Non-malodorous pus in the abscess cavity, otherwise   normal-appearing breast tissue.    COMPLICATIONS:  None apparent.    INDICATIONS:  This is a 40-year-old woman who had her children traumatize her   right breast about 5 weeks ago.  She has had pain and a lump in the area   since.  Despite multiple courses of antibiotics, some of which were IV, she   presents due to persistence of this lump and was found to have ultrasound   images that were suspicious for an abscess.  We discussed drainage of the   abscess as well as a biopsy of the cavity due to her family history of breast   cancer.  We discussed risks, benefits and alternatives, and she agreed to   proceed.    DESCRIPTION OF PROCEDURE:  The patient was brought to the operating room.  She   was placed in a comfortable supine position on the operating room table with   all appropriate points padded.  General anesthesia was induced without   complication.  Timeout was held and completed confirming correct patient,   correct site, correct procedure and SCDs on and functioning.  She received   antibiotics prior to incision.  Her right breast was prepped with ChloraPrep   and draped in the usual sterile fashion.  After infiltration of 0.25% Marcaine   with epinephrine, I made a periareolar incision in the right upper inner   quadrant and easily entered the abscess cavity.  I got immediate return of   about 50 mL of non-malodorous  pus.  This was sent for culture.  I broke up the   loculations with my finger and irrigated the abscess cavity.  I took a random   biopsy and sent it for permanent section though I did not see anything   suspicious or anything other than the abscess.  I obtained excellent   hemostasis.  I again irrigated and then packed the wound with 1-inch Nu Gauze   soaked in saline.  This was dressed with a dry dressing.  The patient was then   awoken from anesthesia in good condition having tolerated the procedure well.    All counts were correct at the end of the case x2.       ____________________________________     MD ALLYSSA Vgea / ORIANA    DD:  09/10/2020 07:51:01  DT:  09/10/2020 08:29:32    D#:  [id number]  Job#:  [id number]

## 2020-09-10 NOTE — CONSULTS
Surgery General History & Physical Note    Date  9/10/2020    Primary Care Physician  Pcp Pt States None    CC  Right breast pain    HPI  This is a 40 y.o. female who presented with about 5 weeks of right breast pain and a firm area after her twins jumped on her breast.  She has had most of her treatment at [facility], and was initially admitted about 5 weeks ago for anemia and treatment of what appeared to be mastitis at the time.  She has had a course of doxycycline and a course of Bactrim, but she has continued to have symptoms.  There is significant pain in the area but that has stayed about stable.  She also has complaints of myalgias, joint pain, malaise and just a general feeling of being unwell.  She has not had any fevers or chills.  She is never had anything like this before.  Of note her sister was diagnosed with breast cancer in her 20s and she has a maternal grandmother who had breast cancer at an older age and actually  of it.  She herself is never had a mammogram.    Past Medical History:   Diagnosis Date   • Anemia     pt unsure what kind of anemia; received blood transfusion at [facility] on 2020       Past Surgical History:   Procedure Laterality Date   • PRIMARY C SECTION      x2       Current Facility-Administered Medications   Medication Dose Route Frequency Provider Last Rate Last Dose   • [MAR Hold] vancomycin (VANCOCIN) 1,250 mg in  mL IVPB  1,250 mg Intravenous Q8HR Vincenzo Morataya M.D. 125 mL/hr at 09/10/20 0614 1,250 mg at 09/10/20 0614   • [MAR Hold] senna-docusate (PERICOLACE or SENOKOT S) 8.6-50 MG per tablet 2 Tab  2 Tab Oral BID Vincenzo Morataya M.D.        And   • [MAR Hold] polyethylene glycol/lytes (MIRALAX) PACKET 1 Packet  1 Packet Oral QDAY PRN Vincenzo Morataya M.D.        And   • [MAR Hold] magnesium hydroxide (MILK OF MAGNESIA) suspension 30 mL  30 mL Oral QDAY PRANDREW Morataya M.D.        And   • [MAR Hold] bisacodyl (DULCOLAX) suppository 10 mg   10 mg Rectal QDAY PRN Vincenzo Morataya M.D.       • [MAR Hold] Respiratory Therapy Consult   Nebulization Continuous RT Vincenzo Morataya M.D.       • [MAR Hold] acetaminophen (TYLENOL) tablet 650 mg  650 mg Oral Q6HRS PRN Vincenzo Morataya M.D.       • [MAR Hold] Pharmacy Consult Request ...Pain Management Review 1 Each  1 Each Other PHARMACY TO DOSE Vincenzo Morataya M.D.        And   • [MAR Hold] oxyCODONE immediate-release (ROXICODONE) tablet 5 mg  5 mg Oral Q3HRS PRN Vincenzo Morataya M.D.        And   • [MAR Hold] oxyCODONE immediate-release (ROXICODONE) tablet 10 mg  10 mg Oral Q3HRS PRN Vincenzo Morataya M.D.        And   • [MAR Hold] morphine (pf) 4 MG/ML injection 4 mg  4 mg Intravenous Q3HRS PRN Vicnenzo Morataya M.D.       • [MAR Hold] ondansetron (ZOFRAN) syringe/vial injection 4 mg  4 mg Intravenous Q4HRS PRN Vincenzo Morataya M.D.       • [MAR Hold] ondansetron (ZOFRAN ODT) dispertab 4 mg  4 mg Oral Q4HRS PRN Vincenzo Morataya M.D.       • [MAR Hold] ampicillin/sulbactam (UNASYN) 3 g in  mL IVPB  3 g Intravenous Q6HRS Vincenzo Morataya M.D.   Stopped at 09/10/20 0328   • [MAR Hold] MD Alert...Vancomycin per Pharmacy   Other PHARMACY TO DOSE Vincenzo Morataya M.D.       • NS infusion   Intravenous Continuous Vincenzo Morataya M.D. 100 mL/hr at 09/10/20 0120         Social History     Socioeconomic History   • Marital status: Single     Spouse name: Not on file   • Number of children: Not on file   • Years of education: Not on file   • Highest education level: Not on file   Occupational History   • Not on file   Social Needs   • Financial resource strain: Not on file   • Food insecurity     Worry: Not on file     Inability: Not on file   • Transportation needs     Medical: Not on file     Non-medical: Not on file   Tobacco Use   • Smoking status: Never Smoker   • Smokeless tobacco: Never Used   Substance and Sexual Activity   • Alcohol use: Never     Frequency: Never   • Drug use: Never   • Sexual activity: Not on file   Lifestyle   • Physical activity      Days per week: Not on file     Minutes per session: Not on file   • Stress: Not on file   Relationships   • Social connections     Talks on phone: Not on file     Gets together: Not on file     Attends Nondenominational service: Not on file     Active member of club or organization: Not on file     Attends meetings of clubs or organizations: Not on file     Relationship status: Not on file   • Intimate partner violence     Fear of current or ex partner: Not on file     Emotionally abused: Not on file     Physically abused: Not on file     Forced sexual activity: Not on file   Other Topics Concern   • Not on file   Social History Narrative   • Not on file       History reviewed. No pertinent family history.    Allergies  Patient has no known allergies.    Review of Systems  General: No weight changes.  Positive for fatigue  HEENT: No changes in vision or trouble swallowing  CV: No chest pain or palpitations  Pulm: No shortness of breath or cough  GI: As above in HPI, no melena or hematochezia, no hematemesis  : No dysuria or hematuria  MSK: Positive for joint and muscle pains  Skin: No new rashes or lesions other than as above in the HPI  Lymph/Heme: No swelling or easy bruising, no lymphadenopathy  Neuro: No headaches or seizures  Psych: No SI/HI    Physical Exam   Constitutional: She is oriented to person, place, and time. She appears well-developed and well-nourished. No distress.   HENT:   Head: Normocephalic and atraumatic.   Mouth/Throat: Oropharynx is clear and moist. No oropharyngeal exudate.   Eyes: Conjunctivae are normal.   Neck: Normal range of motion.   Cardiovascular: Normal rate and regular rhythm.   Pulmonary/Chest: Effort normal.   Abdominal: Soft.   Musculoskeletal: Normal range of motion.   Lymphadenopathy:     She has no cervical adenopathy.   Neurological: She is alert and oriented to person, place, and time.   Skin: Skin is warm and dry. She is not diaphoretic.   Area of induration and slight  erythema with a ballotable area just superior to the right areola in the upper inner quadrant   Psychiatric: She has a normal mood and affect. Her behavior is normal.       Vital Signs  Blood Pressure: 102/59   Temperature: 36 °C (96.8 °F)   Pulse: 68   Respiration: 17   Pulse Oximetry: 95 %       Labs:  Recent Labs     09/09/20 1815   WBC 10.8   RBC 3.98*   HEMOGLOBIN 9.9*   HEMATOCRIT 33.0*   MCV 82.9   MCH 24.9*   MCHC 30.0*   RDW 61.0*   PLATELETCT 443   MPV 8.2*     Recent Labs     09/09/20 1815   SODIUM 133*   POTASSIUM 4.4   CHLORIDE 98   CO2 22   GLUCOSE 105*   BUN 9   CREATININE 0.70   CALCIUM 9.5         Recent Labs     09/09/20 1815   ASTSGOT 20   ALTSGPT 33   TBILIRUBIN 0.2   ALKPHOSPHAT 82   GLOBULIN 3.4       Radiology:  DX-CHEST-PORTABLE (1 VIEW)   Final Result      No acute cardiac or pulmonary abnormalities are identified.      US-CHEST   Final Result      Amorphous echo collection containing sonolucent components and low level echo components with margins difficult to define. Measurement of the largest sonolucent component is 6.1 x 1.3 x 3.3 cm. In the appropriate clinical setting, these findings could be    compatible with breast abscess.      Patient is 40 years old and has no prior breast imaging for comparison. Recommend diagnostic mammogram and breast ultrasound for follow-up evaluation.            Assessment/Plan:  Right breast abscess  Procedure(s):  IRRIGATION AND DEBRIDEMENT, WOUND    I discussed with Ms. Barbosa that I recommended surgical incision and drainage.  This could be an infected hematoma, and abscess, or even a cancer.  We discussed that I will open the area washed out and obtain biopsies as well as cultures.  She understands that this will need to stay open and be packed with gauze in order to heal.  She also understands that she will need to stay in the hospital on IV antibiotics until we obtain sensitivities from the cultures so that we can make sure we are treating with the  appropriate antibiotic, especially since she is already failed 2 courses of p.o. antibiotics.  All of her questions were answered to her apparent satisfaction she agreed to proceed.  We will proceed with surgery this morning.

## 2020-09-10 NOTE — ANESTHESIA PROCEDURE NOTES
Airway    Date/Time: 9/10/2020 7:24 AM  Performed by: Justin Tamez M.D.  Authorized by: Justin Tamez M.D.     Location:  OR  Urgency:  Elective  Difficult Airway: No    Indications for Airway Management:  Anesthesia      Spontaneous Ventilation: absent    Sedation Level:  Deep  Preoxygenated: Yes    Mask Difficulty Assessment:  0 - not attempted  Final Airway Type:  Supraglottic airway  Final Supraglottic Airway:  Standard LMA    SGA Size:  4  Number of Attempts at Approach:  1  Ventilation Between Attempts:  None  Number of Other Approaches Attempted:  0

## 2020-09-10 NOTE — ED NOTES
"Med rec updated and complete via interview with pt at bedside. Pt is only tk 8 mg of her medrol dosepak qd stating that tk as directed is \"too strong\". Allergies reviewed. Pt completed course of Bactrim DS 09/02/20.    Home [facility]  "

## 2020-09-10 NOTE — OR NURSING
Pt's family updated on pt's room status. Pt transported by staff member up to floor. Pt's pain is tolerable for transport.

## 2020-09-11 VITALS
WEIGHT: 233.91 LBS | OXYGEN SATURATION: 96 % | SYSTOLIC BLOOD PRESSURE: 100 MMHG | DIASTOLIC BLOOD PRESSURE: 56 MMHG | HEART RATE: 81 BPM | HEIGHT: 69 IN | BODY MASS INDEX: 34.64 KG/M2 | TEMPERATURE: 97.4 F | RESPIRATION RATE: 18 BRPM

## 2020-09-11 PROBLEM — E87.1 HYPONATREMIA: Status: RESOLVED | Noted: 2020-09-09 | Resolved: 2020-09-11

## 2020-09-11 PROBLEM — N61.1 BREAST ABSCESS: Status: RESOLVED | Noted: 2020-09-09 | Resolved: 2020-09-11

## 2020-09-11 LAB
ANION GAP SERPL CALC-SCNC: 11 MMOL/L (ref 7–16)
BASOPHILS # BLD AUTO: 0.3 % (ref 0–1.8)
BASOPHILS # BLD: 0.02 K/UL (ref 0–0.12)
BUN SERPL-MCNC: 11 MG/DL (ref 8–22)
CALCIUM SERPL-MCNC: 8.5 MG/DL (ref 8.5–10.5)
CHLORIDE SERPL-SCNC: 101 MMOL/L (ref 96–112)
CO2 SERPL-SCNC: 26 MMOL/L (ref 20–33)
CREAT SERPL-MCNC: 0.81 MG/DL (ref 0.5–1.4)
EOSINOPHIL # BLD AUTO: 0.12 K/UL (ref 0–0.51)
EOSINOPHIL NFR BLD: 1.8 % (ref 0–6.9)
ERYTHROCYTE [DISTWIDTH] IN BLOOD BY AUTOMATED COUNT: 59.2 FL (ref 35.9–50)
GLUCOSE SERPL-MCNC: 84 MG/DL (ref 65–99)
HCT VFR BLD AUTO: 30.3 % (ref 37–47)
HGB BLD-MCNC: 9.2 G/DL (ref 12–16)
IMM GRANULOCYTES # BLD AUTO: 0.04 K/UL (ref 0–0.11)
IMM GRANULOCYTES NFR BLD AUTO: 0.6 % (ref 0–0.9)
LYMPHOCYTES # BLD AUTO: 1.37 K/UL (ref 1–4.8)
LYMPHOCYTES NFR BLD: 20.5 % (ref 22–41)
MCH RBC QN AUTO: 25.3 PG (ref 27–33)
MCHC RBC AUTO-ENTMCNC: 30.4 G/DL (ref 33.6–35)
MCV RBC AUTO: 83.5 FL (ref 81.4–97.8)
MONOCYTES # BLD AUTO: 0.43 K/UL (ref 0–0.85)
MONOCYTES NFR BLD AUTO: 6.4 % (ref 0–13.4)
NEUTROPHILS # BLD AUTO: 4.7 K/UL (ref 2–7.15)
NEUTROPHILS NFR BLD: 70.4 % (ref 44–72)
NRBC # BLD AUTO: 0 K/UL
NRBC BLD-RTO: 0 /100 WBC
PLATELET # BLD AUTO: 355 K/UL (ref 164–446)
PMV BLD AUTO: 8.1 FL (ref 9–12.9)
POTASSIUM SERPL-SCNC: 3.9 MMOL/L (ref 3.6–5.5)
RBC # BLD AUTO: 3.63 M/UL (ref 4.2–5.4)
SODIUM SERPL-SCNC: 138 MMOL/L (ref 135–145)
VANCOMYCIN TROUGH SERPL-MCNC: 18.2 UG/ML (ref 10–20)
WBC # BLD AUTO: 6.7 K/UL (ref 4.8–10.8)

## 2020-09-11 PROCEDURE — 700111 HCHG RX REV CODE 636 W/ 250 OVERRIDE (IP): Performed by: HOSPITALIST

## 2020-09-11 PROCEDURE — A9270 NON-COVERED ITEM OR SERVICE: HCPCS | Performed by: INTERNAL MEDICINE

## 2020-09-11 PROCEDURE — 700105 HCHG RX REV CODE 258: Performed by: INTERNAL MEDICINE

## 2020-09-11 PROCEDURE — 36415 COLL VENOUS BLD VENIPUNCTURE: CPT

## 2020-09-11 PROCEDURE — 99238 HOSP IP/OBS DSCHRG MGMT 30/<: CPT | Performed by: HOSPITALIST

## 2020-09-11 PROCEDURE — 700102 HCHG RX REV CODE 250 W/ 637 OVERRIDE(OP): Performed by: INTERNAL MEDICINE

## 2020-09-11 PROCEDURE — 80202 ASSAY OF VANCOMYCIN: CPT

## 2020-09-11 PROCEDURE — 80048 BASIC METABOLIC PNL TOTAL CA: CPT

## 2020-09-11 PROCEDURE — 85025 COMPLETE CBC W/AUTO DIFF WBC: CPT

## 2020-09-11 PROCEDURE — 700111 HCHG RX REV CODE 636 W/ 250 OVERRIDE (IP): Performed by: INTERNAL MEDICINE

## 2020-09-11 RX ORDER — AMOXICILLIN AND CLAVULANATE POTASSIUM 875; 125 MG/1; MG/1
1 TABLET, FILM COATED ORAL 2 TIMES DAILY
Qty: 12 TAB | Refills: 0 | Status: SHIPPED | OUTPATIENT
Start: 2020-09-11 | End: 2020-09-17

## 2020-09-11 RX ADMIN — VANCOMYCIN HYDROCHLORIDE 1250 MG: 500 INJECTION, POWDER, LYOPHILIZED, FOR SOLUTION INTRAVENOUS at 06:24

## 2020-09-11 RX ADMIN — ACETAMINOPHEN 650 MG: 325 TABLET, FILM COATED ORAL at 11:59

## 2020-09-11 RX ADMIN — SODIUM CHLORIDE 3 G: 900 INJECTION INTRAVENOUS at 00:34

## 2020-09-11 RX ADMIN — ONDANSETRON 4 MG: 4 TABLET, ORALLY DISINTEGRATING ORAL at 11:59

## 2020-09-11 RX ADMIN — SODIUM CHLORIDE 3 G: 900 INJECTION INTRAVENOUS at 11:59

## 2020-09-11 RX ADMIN — OXYCODONE HYDROCHLORIDE 10 MG: 10 TABLET ORAL at 07:30

## 2020-09-11 RX ADMIN — ENOXAPARIN SODIUM 40 MG: 40 INJECTION SUBCUTANEOUS at 05:42

## 2020-09-11 RX ADMIN — DOCUSATE SODIUM 50 MG AND SENNOSIDES 8.6 MG 2 TABLET: 8.6; 5 TABLET, FILM COATED ORAL at 05:42

## 2020-09-11 RX ADMIN — SODIUM CHLORIDE 3 G: 900 INJECTION INTRAVENOUS at 05:42

## 2020-09-11 ASSESSMENT — PAIN DESCRIPTION - PAIN TYPE
TYPE: ACUTE PAIN

## 2020-09-11 NOTE — PROGRESS NOTES
Assumed care of patient at 0700. Patient is alert and oriented, respirations are unlabored and regular. Lung sounds clear throughout on room air. Pain stated at 3. Abdomen soft, non tender, BM 9/11. Voiding without difficulty. Right breast wound, dressing changed this AM with strip packing/abd pad/hypafix tape, patient tolerated well. Bed in lowest position, call light within reach, patient states no needs at this time.

## 2020-09-11 NOTE — PROGRESS NOTES
"Report received. Assessment complete.  AAOx4. Patient calls appropriately.  Pt reports pain 7/10 \"and climbing.\" medicated per MAR. No further interventions needed at this time. Will continue to monitor.   Pt denies N/V, SOB, or chest pain.  GARY, independently ambulatory.  + void, LBM 09/09/20  Pt is tolerating regular diet.     Packed dressing in place to R breast, CDI. To be changed qshift.     Plan of care discussed with patient. Fall precautions in place. Belongings and call light within reach. Educated patient to call for assistance as needed. All needs met at this time.   "

## 2020-09-11 NOTE — DISCHARGE INSTRUCTIONS
Discharge Instructions per Goran Andrews D.O.    DIET: Healthy balanced diet    ACTIVITY: as tolerates.  Please complete antibiotics unless having side effects from them.    DIAGNOSIS: Right breast abscess    Return to ER if worsening discharge, increasing redness or tenderness or any fevers.    Discharge Instructions    Discharged to home by car with relative. Discharged via wheelchair, hospital escort: Yes.  Special equipment needed: Not Applicable    Be sure to schedule a follow-up appointment with your primary care doctor or any specialists as instructed.     Discharge Plan:   Diet Plan: Discussed  Activity Level: Discussed  Confirmed Follow up Appointment: Patient to Call and Schedule Appointment  Confirmed Symptoms Management: Discussed  Medication Reconciliation Updated: Yes  Influenza Vaccine Indication: Indicated: Not available from distributor/    I understand that a diet low in cholesterol, fat, and sodium is recommended for good health. Unless I have been given specific instructions below for another diet, I accept this instruction as my diet prescription.   Other diet: regular    Special Instructions: None    · Is patient discharged on Warfarin / Coumadin?   No     Depression / Suicide Risk    As you are discharged from this [facility] facility, it is important to learn how to keep safe from harming yourself.    Recognize the warning signs:  · Abrupt changes in personality, positive or negative- including increase in energy   · Giving away possessions  · Change in eating patterns- significant weight changes-  positive or negative  · Change in sleeping patterns- unable to sleep or sleeping all the time   · Unwillingness or inability to communicate  · Depression  · Unusual sadness, discouragement and loneliness  · Talk of wanting to die  · Neglect of personal appearance   · Rebelliousness- reckless behavior  · Withdrawal from people/activities they love  · Confusion- inability to  "concentrate     If you or a loved one observes any of these behaviors or has concerns about self-harm, here's what you can do:  · Talk about it- your feelings and reasons for harming yourself  · Remove any means that you might use to hurt yourself (examples: pills, rope, extension cords, firearm)  · Get professional help from the community (Mental Health, Substance Abuse, psychological counseling)  · Do not be alone:Call your Safe Contact- someone whom you trust who will be there for you.  · Call your local CRISIS HOTLINE 730-8369 or 200-342-2667  · Call your local Children's Mobile Crisis Response Team [place] (687) 134-3222 or www.iBoxPay  · Call the toll free National Suicide Prevention Hotlines   · National Suicide Prevention Lifeline 066-093-MQXW (1105)  · National Hope Line Network 800-SUICIDE (455-7887)    Dressing Change:    Change dressing to right breast 2 times a day.  Remove gauze dressing and strip packing.  Wet new strip packing with normal saline.  Gently push strip packing into hole until it is filled with packing. Leave a \"tail\" out for easy removal.  Apply 4x4 gauze and tape.          "

## 2020-09-11 NOTE — DISCHARGE SUMMARY
Discharge Summary    CHIEF COMPLAINT ON ADMISSION  Chief Complaint   Patient presents with   • Chills     x1 month   • Generalized Body Aches     x1 month; also c/o joint pain   • Breast Pain     x1 month; pt states she has a lump in her right breast from her child jumping on it and she took a round of antibiotics for it (finished 5 days ago)   • Low Back Pain     bilateral low back pain x4 days   • Urinary Frequency     x2 weeks       Reason for Admission  chills, body aches     Admission Date  9/9/2020    CODE STATUS  Full Code    HPI & HOSPITAL COURSE  This is a 40 y.o. female admitted 9/9/2020 with right breast abscess despite recent antibiotic treatment.  She had right breast surgery and had excision/drainage.  The Gram stain from the wound abscess did not show any organisms.  Patient was seen by surgery the following day and felt that the patient could go home with outpatient wound care/packing completion of antibiotics and follow-up with Dr. Garcia.    Therefore, she is discharged in good and stable condition to home with close outpatient follow-up.    The patient met 2-midnight criteria for an inpatient stay at the time of discharge.    Discharge Date  9/11/2020    FOLLOW UP ITEMS POST DISCHARGE  None    DISCHARGE DIAGNOSES  Principal Problem (Resolved):    Breast abscess POA: Yes  Active Problems:    Anemia POA: Yes    Obesity (BMI 30-39.9) POA: Unknown  Resolved Problems:    Hyponatremia POA: Yes      FOLLOW UP  No future appointments.  Your primary care provider    Schedule an appointment as soon as possible for a visit in 1 week      Netta Blanca M.D.  [address]  [place] [address]  [phone]    Schedule an appointment as soon as possible for a visit in 1 week        MEDICATIONS ON DISCHARGE     Medication List      START taking these medications      Instructions   amoxicillin-clavulanate 875-125 MG Tabs  Commonly known as: AUGMENTIN   Take 1 Tab by mouth 2 times a day  for 6 days.  Dose: 1 Tab        CONTINUE taking these medications      Instructions   Diclofenac Sodium 1 % Gel   Apply 1 Application to skin as directed every day.  Dose: 1 Application     ferrous sulfate 325 (65 Fe) MG tablet      folic acid 1 MG Tabs  Commonly known as: FOLVITE   Take 1 mg by mouth every day.  Dose: 1 mg     Iron Chews Pediatric 15 MG Chew  Generic drug: Carbonyl Iron      norethindrone 5 MG tablet  Commonly known as: AYGESTIN   Take 5 mg by mouth every day.  Dose: 5 mg        STOP taking these medications    Bactrim -160 MG tablet  Generic drug: sulfamethoxazole-trimethoprim     methylPREDNISolone 4 MG Tbpk  Commonly known as: MEDROL DOSEPAK            Allergies  No Known Allergies    DIET  Orders Placed This Encounter   Procedures   • Diet Order Regular     Standing Status:   Standing     Number of Occurrences:   1     Order Specific Question:   Diet:     Answer:   Regular [1]       ACTIVITY  As tolerated.  Weight bearing as tolerated    CONSULTATIONS  General Surgeon: Dr Netta Blanca    PROCEDURES  Right breast abscess Incision and drainage    LABORATORY  Lab Results   Component Value Date    SODIUM 138 09/11/2020    POTASSIUM 3.9 09/11/2020    CHLORIDE 101 09/11/2020    CO2 26 09/11/2020    GLUCOSE 84 09/11/2020    BUN 11 09/11/2020    CREATININE 0.81 09/11/2020        Lab Results   Component Value Date    WBC 6.7 09/11/2020    HEMOGLOBIN 9.2 (L) 09/11/2020    HEMATOCRIT 30.3 (L) 09/11/2020    PLATELETCT 355 09/11/2020        Total time of the discharge process exceeds 25 minutes.

## 2020-09-11 NOTE — PROGRESS NOTES
Discharging Patient home per physician order.  Discharged with relative.  Demonstrated understanding of discharge instructions, follow up appointments, home medications, prescriptions, home care for surgical wound, and nursing care instructions for wound care.  Ambulating without assistance, voiding without difficulty, pain well controlled, tolerating oral medications, oxygen saturation greater than 90% , tolerating diet.   Educational handouts how to do dressing change, supplies given for dressing change given and discussed.  Verbalized understanding of discharge instructions and educational handouts.  All questions answered.  Belongings with patient at time of discharge.

## 2020-09-11 NOTE — PROGRESS NOTES
"Pharmacy Kinetics 40 y.o. female on vancomycin day # 2 (2020)    Currently on Vancomycin 1250 mg iv q8hr (0600, 1400, 2200)     Provider specified end date: none     Indication for Treatment: right breast abscess     Pertinent history per medical record: Admitted on 2020 for breast pain for the last month. Pt developed a lump in her right breast after her child jumped on it. She finished a 5 day course of antibiotics with no relief. Pt also reports bilateral lower back pain and urinary frequency x2 weeks.     Other antibiotics: ampicillin-sulbactam 3 g q6h     Allergies: Patient has no known allergies.      Concerns for renal function: obesity     Pertinent cultures to date:   : blood cultures x2 - NGTD  9/10: breast culture       Recent Labs     20  0620   WBC 10.8 6.7   NEUTSPOLYS 86.00* 70.40     Recent Labs     20  18120  0620   BUN 9 11   CREATININE 0.70 0.81   ALBUMIN 4.1  --      Recent Labs     20  0620   VANCOTROUGH 18.2       Intake/Output Summary (Last 24 hours) at 2020 1018  Last data filed at 2020 0724  Gross per 24 hour   Intake 680 ml   Output 0 ml   Net 680 ml      /56   Pulse 81   Temp 36.3 °C (97.4 °F) (Temporal)   Resp 18   Ht 1.753 m (5' 9\")   Wt 106.1 kg (233 lb 14.5 oz)   SpO2 96%  Temp (24hrs), Av.3 °C (97.3 °F), Min:36.1 °C (96.9 °F), Max:36.4 °C (97.6 °F)      A/P   1. Vancomycin dose change: Decrease vancomycin to 1250 mg q12H (0630,1830)  2. Next vancomycin level: Anticipate 20 at 0600 (will need to be ordered)  3. Goal trough: 10-15 mcg/mL  4. Comments: Vancomycin trough resulted supratherapeutic at 18.2 mcg/mL. Scr stable. BC remain NGTD. Patient underwent I and D of breast abscess.  Will decrease dose of vancomycin to 1250 mg q12H with repeat level in a couple of days. Pharmacy will continue to monitor.    Jesika Adorno, PharmD, BCPS    "

## 2020-09-11 NOTE — DIETARY
Nutrition Services: MST score = 3 due to unsure weight hx and recent decreased appetite.    Pt is a 41 yo F admitted for breast abscess and on day 2 of admit on a Regular diet.    Ht: 175.3 cm  Wt: 106.1 kg   BMI: 34.5 kg/m^2 (obese, class I)    Obtained wt hx via chart review, appears UBW has been 230-235 lb for the past year - no recent weight changes. With stable weight hx MST score reduced to < 2, therefore no nutrition assessment indicated at this time.     RD available prn - and will monitor per dept policy

## 2020-09-11 NOTE — PROGRESS NOTES
Surgical Progress Note    Author: Samuel Olson M.D. Date & Time created: 2020   6:45 AM     Interval Events:  S/p I and D of breast abscess  Dressing changed  Final cultures pending but no organisms seen on Gram stain  ROS  Hemodynamics:  Temp (24hrs), Av.3 °C (97.4 °F), Min:36.1 °C (96.9 °F), Max:36.7 °C (98.1 °F)  Temperature: 36.4 °C (97.6 °F)  Pulse  Av.2  Min: 58  Max: 92   Blood Pressure: 133/56     Respiratory:    Respiration: 18, Pulse Oximetry: 96 %           Neuro:  GCS       Fluids:    Intake/Output Summary (Last 24 hours) at 2020 0645  Last data filed at 2020 0000  Gross per 24 hour   Intake 1030 ml   Output 10 ml   Net 1020 ml        Current Diet Order   Procedures   • Diet Order Regular     Physical Exam  Labs:  Recent Results (from the past 24 hour(s))   GRAM STAIN    Collection Time: 09/10/20  7:33 AM    Specimen: Wound   Result Value Ref Range    Significant Indicator .     Source WND     Site Right Breast     Gram Stain Result Many WBCs.  No organisms seen.      Histology Request    Collection Time: 09/10/20  9:22 AM   Result Value Ref Range    Pathology Request Sent to Histo    CBC WITH DIFFERENTIAL    Collection Time: 20  6:20 AM   Result Value Ref Range    WBC 6.7 4.8 - 10.8 K/uL    RBC 3.63 (L) 4.20 - 5.40 M/uL    Hemoglobin 9.2 (L) 12.0 - 16.0 g/dL    Hematocrit 30.3 (L) 37.0 - 47.0 %    MCV 83.5 81.4 - 97.8 fL    MCH 25.3 (L) 27.0 - 33.0 pg    MCHC 30.4 (L) 33.6 - 35.0 g/dL    RDW 59.2 (H) 35.9 - 50.0 fL    Platelet Count 355 164 - 446 K/uL    MPV 8.1 (L) 9.0 - 12.9 fL    Neutrophils-Polys 70.40 44.00 - 72.00 %    Lymphocytes 20.50 (L) 22.00 - 41.00 %    Monocytes 6.40 0.00 - 13.40 %    Eosinophils 1.80 0.00 - 6.90 %    Basophils 0.30 0.00 - 1.80 %    Immature Granulocytes 0.60 0.00 - 0.90 %    Nucleated RBC 0.00 /100 WBC    Neutrophils (Absolute) 4.70 2.00 - 7.15 K/uL    Lymphs (Absolute) 1.37 1.00 - 4.80 K/uL    Monos (Absolute) 0.43 0.00 - 0.85 K/uL     Eos (Absolute) 0.12 0.00 - 0.51 K/uL    Baso (Absolute) 0.02 0.00 - 0.12 K/uL    Immature Granulocytes (abs) 0.04 0.00 - 0.11 K/uL    NRBC (Absolute) 0.00 K/uL     Medical Decision Making, by Problem:  Active Hospital Problems    Diagnosis   • Breast abscess [N61.1]     Priority: High   • Obesity (BMI 30-39.9) [E66.9]   • Anemia [D64.9]   • Hyponatremia [E87.1]     Plan:  Ok for discharge on oral antibiotics and dressing changes to f/u--dx might be granulomatous mastitis    Quality Measures:  Quality-Core Measures

## 2020-09-13 ENCOUNTER — HOSPITAL ENCOUNTER (OUTPATIENT)
Facility: MEDICAL CENTER | Age: 40
End: 2020-09-15
Attending: EMERGENCY MEDICINE | Admitting: HOSPITALIST
Payer: MEDICAID

## 2020-09-13 DIAGNOSIS — M25.50 ARTHRALGIA, UNSPECIFIED JOINT: ICD-10-CM

## 2020-09-13 DIAGNOSIS — M13.0 POLYARTHRITIS: ICD-10-CM

## 2020-09-13 PROBLEM — R53.81 DEBILITY: Status: ACTIVE | Noted: 2020-09-13

## 2020-09-13 PROBLEM — R79.82 ELEVATED C-REACTIVE PROTEIN (CRP): Status: ACTIVE | Noted: 2020-09-13

## 2020-09-13 PROBLEM — M25.60 JOINT STIFFNESS: Status: ACTIVE | Noted: 2020-09-13

## 2020-09-13 PROBLEM — D50.9 IRON (FE) DEFICIENCY ANEMIA: Status: ACTIVE | Noted: 2020-09-13

## 2020-09-13 LAB
ALBUMIN SERPL BCP-MCNC: 4 G/DL (ref 3.2–4.9)
ALBUMIN/GLOB SERPL: 1.1 G/DL
ALP SERPL-CCNC: 89 U/L (ref 30–99)
ALT SERPL-CCNC: 28 U/L (ref 2–50)
ANION GAP SERPL CALC-SCNC: 13 MMOL/L (ref 7–16)
AST SERPL-CCNC: 13 U/L (ref 12–45)
BACTERIA WND AEROBE CULT: NORMAL
BASOPHILS # BLD AUTO: 0.2 % (ref 0–1.8)
BASOPHILS # BLD: 0.02 K/UL (ref 0–0.12)
BILIRUB SERPL-MCNC: 0.2 MG/DL (ref 0.1–1.5)
BUN SERPL-MCNC: 10 MG/DL (ref 8–22)
CALCIUM SERPL-MCNC: 9.3 MG/DL (ref 8.5–10.5)
CHLORIDE SERPL-SCNC: 99 MMOL/L (ref 96–112)
CK SERPL-CCNC: 20 U/L (ref 0–154)
CO2 SERPL-SCNC: 23 MMOL/L (ref 20–33)
CREAT SERPL-MCNC: 0.97 MG/DL (ref 0.5–1.4)
CRP SERPL HS-MCNC: 10.23 MG/DL (ref 0–0.75)
EOSINOPHIL # BLD AUTO: 0.08 K/UL (ref 0–0.51)
EOSINOPHIL NFR BLD: 0.7 % (ref 0–6.9)
ERYTHROCYTE [DISTWIDTH] IN BLOOD BY AUTOMATED COUNT: 59.3 FL (ref 35.9–50)
ERYTHROCYTE [SEDIMENTATION RATE] IN BLOOD BY WESTERGREN METHOD: 65 MM/HOUR (ref 0–20)
GLOBULIN SER CALC-MCNC: 3.8 G/DL (ref 1.9–3.5)
GLUCOSE SERPL-MCNC: 112 MG/DL (ref 65–99)
GRAM STN SPEC: NORMAL
HCT VFR BLD AUTO: 33.8 % (ref 37–47)
HGB BLD-MCNC: 10.2 G/DL (ref 12–16)
IMM GRANULOCYTES # BLD AUTO: 0.06 K/UL (ref 0–0.11)
IMM GRANULOCYTES NFR BLD AUTO: 0.6 % (ref 0–0.9)
LACTATE BLD-SCNC: 1 MMOL/L (ref 0.5–2)
LYMPHOCYTES # BLD AUTO: 1.49 K/UL (ref 1–4.8)
LYMPHOCYTES NFR BLD: 13.8 % (ref 22–41)
MCH RBC QN AUTO: 25.2 PG (ref 27–33)
MCHC RBC AUTO-ENTMCNC: 30.2 G/DL (ref 33.6–35)
MCV RBC AUTO: 83.7 FL (ref 81.4–97.8)
MONOCYTES # BLD AUTO: 0.47 K/UL (ref 0–0.85)
MONOCYTES NFR BLD AUTO: 4.4 % (ref 0–13.4)
NEUTROPHILS # BLD AUTO: 8.65 K/UL (ref 2–7.15)
NEUTROPHILS NFR BLD: 80.3 % (ref 44–72)
NRBC # BLD AUTO: 0 K/UL
NRBC BLD-RTO: 0 /100 WBC
NT-PROBNP SERPL IA-MCNC: 52 PG/ML (ref 0–125)
PLATELET # BLD AUTO: 426 K/UL (ref 164–446)
PMV BLD AUTO: 8.1 FL (ref 9–12.9)
POTASSIUM SERPL-SCNC: 4 MMOL/L (ref 3.6–5.5)
PROT SERPL-MCNC: 7.8 G/DL (ref 6–8.2)
RBC # BLD AUTO: 4.04 M/UL (ref 4.2–5.4)
SIGNIFICANT IND 70042: NORMAL
SITE SITE: NORMAL
SODIUM SERPL-SCNC: 135 MMOL/L (ref 135–145)
SOURCE SOURCE: NORMAL
WBC # BLD AUTO: 10.8 K/UL (ref 4.8–10.8)

## 2020-09-13 PROCEDURE — G0378 HOSPITAL OBSERVATION PER HR: HCPCS

## 2020-09-13 PROCEDURE — 96374 THER/PROPH/DIAG INJ IV PUSH: CPT

## 2020-09-13 PROCEDURE — 86140 C-REACTIVE PROTEIN: CPT

## 2020-09-13 PROCEDURE — 36415 COLL VENOUS BLD VENIPUNCTURE: CPT

## 2020-09-13 PROCEDURE — 87040 BLOOD CULTURE FOR BACTERIA: CPT

## 2020-09-13 PROCEDURE — 80053 COMPREHEN METABOLIC PANEL: CPT

## 2020-09-13 PROCEDURE — 99219 PR INITIAL OBSERVATION CARE,LEVL II: CPT | Performed by: HOSPITALIST

## 2020-09-13 PROCEDURE — 83880 ASSAY OF NATRIURETIC PEPTIDE: CPT

## 2020-09-13 PROCEDURE — 83605 ASSAY OF LACTIC ACID: CPT

## 2020-09-13 PROCEDURE — 99285 EMERGENCY DEPT VISIT HI MDM: CPT

## 2020-09-13 PROCEDURE — 85652 RBC SED RATE AUTOMATED: CPT

## 2020-09-13 PROCEDURE — 82550 ASSAY OF CK (CPK): CPT

## 2020-09-13 PROCEDURE — 96375 TX/PRO/DX INJ NEW DRUG ADDON: CPT

## 2020-09-13 PROCEDURE — 700111 HCHG RX REV CODE 636 W/ 250 OVERRIDE (IP): Performed by: EMERGENCY MEDICINE

## 2020-09-13 PROCEDURE — 85025 COMPLETE CBC W/AUTO DIFF WBC: CPT

## 2020-09-13 RX ORDER — METHYLPREDNISOLONE SODIUM SUCCINATE 40 MG/ML
40 INJECTION, POWDER, LYOPHILIZED, FOR SOLUTION INTRAMUSCULAR; INTRAVENOUS EVERY 12 HOURS
Status: DISCONTINUED | OUTPATIENT
Start: 2020-09-14 | End: 2020-09-15

## 2020-09-13 RX ORDER — KETOROLAC TROMETHAMINE 30 MG/ML
30 INJECTION, SOLUTION INTRAMUSCULAR; INTRAVENOUS EVERY 6 HOURS
Status: DISCONTINUED | OUTPATIENT
Start: 2020-09-14 | End: 2020-09-15

## 2020-09-13 RX ORDER — ACETAMINOPHEN 325 MG/1
650 TABLET ORAL EVERY 6 HOURS PRN
Status: DISCONTINUED | OUTPATIENT
Start: 2020-09-13 | End: 2020-09-15 | Stop reason: HOSPADM

## 2020-09-13 RX ORDER — MORPHINE SULFATE 4 MG/ML
4 INJECTION, SOLUTION INTRAMUSCULAR; INTRAVENOUS ONCE
Status: COMPLETED | OUTPATIENT
Start: 2020-09-13 | End: 2020-09-13

## 2020-09-13 RX ORDER — ONDANSETRON 2 MG/ML
4 INJECTION INTRAMUSCULAR; INTRAVENOUS ONCE
Status: COMPLETED | OUTPATIENT
Start: 2020-09-13 | End: 2020-09-13

## 2020-09-13 RX ORDER — PROCHLORPERAZINE EDISYLATE 5 MG/ML
5-10 INJECTION INTRAMUSCULAR; INTRAVENOUS EVERY 4 HOURS PRN
Status: DISCONTINUED | OUTPATIENT
Start: 2020-09-13 | End: 2020-09-15 | Stop reason: HOSPADM

## 2020-09-13 RX ORDER — PROMETHAZINE HYDROCHLORIDE 25 MG/1
12.5-25 SUPPOSITORY RECTAL EVERY 4 HOURS PRN
Status: DISCONTINUED | OUTPATIENT
Start: 2020-09-13 | End: 2020-09-15 | Stop reason: HOSPADM

## 2020-09-13 RX ORDER — ONDANSETRON 4 MG/1
4 TABLET, ORALLY DISINTEGRATING ORAL EVERY 4 HOURS PRN
Status: DISCONTINUED | OUTPATIENT
Start: 2020-09-13 | End: 2020-09-15 | Stop reason: HOSPADM

## 2020-09-13 RX ORDER — AMOXICILLIN AND CLAVULANATE POTASSIUM 875; 125 MG/1; MG/1
1 TABLET, FILM COATED ORAL 2 TIMES DAILY
Status: DISCONTINUED | OUTPATIENT
Start: 2020-09-13 | End: 2020-09-15 | Stop reason: HOSPADM

## 2020-09-13 RX ORDER — ONDANSETRON 2 MG/ML
4 INJECTION INTRAMUSCULAR; INTRAVENOUS EVERY 4 HOURS PRN
Status: DISCONTINUED | OUTPATIENT
Start: 2020-09-13 | End: 2020-09-15 | Stop reason: HOSPADM

## 2020-09-13 RX ORDER — PROMETHAZINE HYDROCHLORIDE 25 MG/1
12.5-25 TABLET ORAL EVERY 4 HOURS PRN
Status: DISCONTINUED | OUTPATIENT
Start: 2020-09-13 | End: 2020-09-15 | Stop reason: HOSPADM

## 2020-09-13 RX ORDER — FERROUS SULFATE 325(65) MG
325 TABLET ORAL
Status: DISCONTINUED | OUTPATIENT
Start: 2020-09-14 | End: 2020-09-15 | Stop reason: HOSPADM

## 2020-09-13 RX ADMIN — ONDANSETRON 4 MG: 2 INJECTION INTRAMUSCULAR; INTRAVENOUS at 21:10

## 2020-09-13 RX ADMIN — MORPHINE SULFATE 4 MG: 4 INJECTION INTRAVENOUS at 21:10

## 2020-09-13 ASSESSMENT — LIFESTYLE VARIABLES
DOES PATIENT WANT TO STOP DRINKING: NO
HAVE PEOPLE ANNOYED YOU BY CRITICIZING YOUR DRINKING: NO
ON A TYPICAL DAY WHEN YOU DRINK ALCOHOL HOW MANY DRINKS DO YOU HAVE: 0
EVER HAD A DRINK FIRST THING IN THE MORNING TO STEADY YOUR NERVES TO GET RID OF A HANGOVER: NO
TOTAL SCORE: 0
HOW MANY TIMES IN THE PAST YEAR HAVE YOU HAD 5 OR MORE DRINKS IN A DAY: 0
EVER FELT BAD OR GUILTY ABOUT YOUR DRINKING: NO
AVERAGE NUMBER OF DAYS PER WEEK YOU HAVE A DRINK CONTAINING ALCOHOL: 0
TOTAL SCORE: 0
TOTAL SCORE: 0
HAVE YOU EVER FELT YOU SHOULD CUT DOWN ON YOUR DRINKING: NO
CONSUMPTION TOTAL: NEGATIVE
DO YOU DRINK ALCOHOL: NO

## 2020-09-13 ASSESSMENT — FIBROSIS 4 INDEX
FIB4 SCORE: 0.23
FIB4 SCORE: 0.39

## 2020-09-13 ASSESSMENT — PAIN DESCRIPTION - PAIN TYPE: TYPE: ACUTE PAIN

## 2020-09-14 LAB
ALBUMIN SERPL BCP-MCNC: 3.3 G/DL (ref 3.2–4.9)
ALBUMIN/GLOB SERPL: 0.9 G/DL
ALP SERPL-CCNC: 73 U/L (ref 30–99)
ALT SERPL-CCNC: 25 U/L (ref 2–50)
ANION GAP SERPL CALC-SCNC: 12 MMOL/L (ref 7–16)
ANISOCYTOSIS BLD QL SMEAR: ABNORMAL
APPEARANCE UR: CLEAR
AST SERPL-CCNC: 10 U/L (ref 12–45)
BASOPHILS # BLD AUTO: 0.2 % (ref 0–1.8)
BASOPHILS # BLD: 0.02 K/UL (ref 0–0.12)
BILIRUB SERPL-MCNC: 0.3 MG/DL (ref 0.1–1.5)
BILIRUB UR QL STRIP.AUTO: NEGATIVE
BUN SERPL-MCNC: 12 MG/DL (ref 8–22)
CALCIUM SERPL-MCNC: 8.9 MG/DL (ref 8.5–10.5)
CHLORIDE SERPL-SCNC: 102 MMOL/L (ref 96–112)
CO2 SERPL-SCNC: 20 MMOL/L (ref 20–33)
COLOR UR: YELLOW
COMMENT 1642: NORMAL
CREAT SERPL-MCNC: 0.92 MG/DL (ref 0.5–1.4)
EOSINOPHIL # BLD AUTO: 0.1 K/UL (ref 0–0.51)
EOSINOPHIL NFR BLD: 1.2 % (ref 0–6.9)
ERYTHROCYTE [DISTWIDTH] IN BLOOD BY AUTOMATED COUNT: 59.4 FL (ref 35.9–50)
GLOBULIN SER CALC-MCNC: 3.7 G/DL (ref 1.9–3.5)
GLUCOSE SERPL-MCNC: 95 MG/DL (ref 65–99)
GLUCOSE UR STRIP.AUTO-MCNC: NEGATIVE MG/DL
HCT VFR BLD AUTO: 30.4 % (ref 37–47)
HGB BLD-MCNC: 8.9 G/DL (ref 12–16)
HYPOCHROMIA BLD QL SMEAR: ABNORMAL
IMM GRANULOCYTES # BLD AUTO: 0.06 K/UL (ref 0–0.11)
IMM GRANULOCYTES NFR BLD AUTO: 0.7 % (ref 0–0.9)
KETONES UR STRIP.AUTO-MCNC: NEGATIVE MG/DL
LEUKOCYTE ESTERASE UR QL STRIP.AUTO: NEGATIVE
LYMPHOCYTES # BLD AUTO: 1.58 K/UL (ref 1–4.8)
LYMPHOCYTES NFR BLD: 19.1 % (ref 22–41)
MCH RBC QN AUTO: 24.8 PG (ref 27–33)
MCHC RBC AUTO-ENTMCNC: 29.3 G/DL (ref 33.6–35)
MCV RBC AUTO: 84.7 FL (ref 81.4–97.8)
MICRO URNS: NORMAL
MICROCYTES BLD QL SMEAR: ABNORMAL
MONOCYTES # BLD AUTO: 0.6 K/UL (ref 0–0.85)
MONOCYTES NFR BLD AUTO: 7.3 % (ref 0–13.4)
MORPHOLOGY BLD-IMP: NORMAL
NEUTROPHILS # BLD AUTO: 5.91 K/UL (ref 2–7.15)
NEUTROPHILS NFR BLD: 71.5 % (ref 44–72)
NITRITE UR QL STRIP.AUTO: NEGATIVE
NRBC # BLD AUTO: 0 K/UL
NRBC BLD-RTO: 0 /100 WBC
OVALOCYTES BLD QL SMEAR: NORMAL
PH UR STRIP.AUTO: 7 [PH] (ref 5–8)
PLATELET # BLD AUTO: 391 K/UL (ref 164–446)
PMV BLD AUTO: 8.5 FL (ref 9–12.9)
POIKILOCYTOSIS BLD QL SMEAR: NORMAL
POLYCHROMASIA BLD QL SMEAR: NORMAL
POTASSIUM SERPL-SCNC: 4.2 MMOL/L (ref 3.6–5.5)
PROT SERPL-MCNC: 7 G/DL (ref 6–8.2)
PROT UR QL STRIP: NEGATIVE MG/DL
RBC # BLD AUTO: 3.59 M/UL (ref 4.2–5.4)
RBC BLD AUTO: PRESENT
RBC UR QL AUTO: NEGATIVE
SODIUM SERPL-SCNC: 134 MMOL/L (ref 135–145)
SP GR UR STRIP.AUTO: 1.01
UROBILINOGEN UR STRIP.AUTO-MCNC: 0.2 MG/DL
WBC # BLD AUTO: 8.3 K/UL (ref 4.8–10.8)

## 2020-09-14 PROCEDURE — 87040 BLOOD CULTURE FOR BACTERIA: CPT

## 2020-09-14 PROCEDURE — 81003 URINALYSIS AUTO W/O SCOPE: CPT

## 2020-09-14 PROCEDURE — 97535 SELF CARE MNGMENT TRAINING: CPT

## 2020-09-14 PROCEDURE — A9270 NON-COVERED ITEM OR SERVICE: HCPCS | Performed by: HOSPITALIST

## 2020-09-14 PROCEDURE — 700102 HCHG RX REV CODE 250 W/ 637 OVERRIDE(OP): Performed by: HOSPITALIST

## 2020-09-14 PROCEDURE — 99225 PR SUBSEQUENT OBSERVATION CARE,LEVEL II: CPT | Performed by: HOSPITALIST

## 2020-09-14 PROCEDURE — G0378 HOSPITAL OBSERVATION PER HR: HCPCS

## 2020-09-14 PROCEDURE — 97161 PT EVAL LOW COMPLEX 20 MIN: CPT

## 2020-09-14 PROCEDURE — 96372 THER/PROPH/DIAG INJ SC/IM: CPT

## 2020-09-14 PROCEDURE — 85025 COMPLETE CBC W/AUTO DIFF WBC: CPT

## 2020-09-14 PROCEDURE — 96376 TX/PRO/DX INJ SAME DRUG ADON: CPT

## 2020-09-14 PROCEDURE — 96375 TX/PRO/DX INJ NEW DRUG ADDON: CPT

## 2020-09-14 PROCEDURE — 700111 HCHG RX REV CODE 636 W/ 250 OVERRIDE (IP): Performed by: HOSPITALIST

## 2020-09-14 PROCEDURE — 80053 COMPREHEN METABOLIC PANEL: CPT

## 2020-09-14 RX ORDER — ACETAMINOPHEN 325 MG/1
650 TABLET ORAL EVERY 6 HOURS PRN
COMMUNITY
End: 2020-10-20

## 2020-09-14 RX ADMIN — AMOXICILLIN AND CLAVULANATE POTASSIUM 1 TABLET: 875; 125 TABLET, FILM COATED ORAL at 09:14

## 2020-09-14 RX ADMIN — METHYLPREDNISOLONE SODIUM SUCCINATE 40 MG: 40 INJECTION, POWDER, FOR SOLUTION INTRAMUSCULAR; INTRAVENOUS at 06:03

## 2020-09-14 RX ADMIN — KETOROLAC TROMETHAMINE 30 MG: 30 INJECTION, SOLUTION INTRAMUSCULAR at 01:29

## 2020-09-14 RX ADMIN — ENOXAPARIN SODIUM 40 MG: 40 INJECTION SUBCUTANEOUS at 06:03

## 2020-09-14 RX ADMIN — METHYLPREDNISOLONE SODIUM SUCCINATE 40 MG: 40 INJECTION, POWDER, FOR SOLUTION INTRAMUSCULAR; INTRAVENOUS at 17:48

## 2020-09-14 RX ADMIN — FERROUS SULFATE TAB 325 MG (65 MG ELEMENTAL FE) 325 MG: 325 (65 FE) TAB at 09:14

## 2020-09-14 RX ADMIN — KETOROLAC TROMETHAMINE 30 MG: 30 INJECTION, SOLUTION INTRAMUSCULAR at 06:03

## 2020-09-14 RX ADMIN — AMOXICILLIN AND CLAVULANATE POTASSIUM 1 TABLET: 875; 125 TABLET, FILM COATED ORAL at 21:12

## 2020-09-14 RX ADMIN — AMOXICILLIN AND CLAVULANATE POTASSIUM 1 TABLET: 875; 125 TABLET, FILM COATED ORAL at 01:29

## 2020-09-14 RX ADMIN — KETOROLAC TROMETHAMINE 30 MG: 30 INJECTION, SOLUTION INTRAMUSCULAR at 17:48

## 2020-09-14 RX ADMIN — KETOROLAC TROMETHAMINE 30 MG: 30 INJECTION, SOLUTION INTRAMUSCULAR at 23:23

## 2020-09-14 RX ADMIN — KETOROLAC TROMETHAMINE 30 MG: 30 INJECTION, SOLUTION INTRAMUSCULAR at 13:15

## 2020-09-14 ASSESSMENT — COGNITIVE AND FUNCTIONAL STATUS - GENERAL
MOBILITY SCORE: 23
SUGGESTED CMS G CODE MODIFIER MOBILITY: CI
CLIMB 3 TO 5 STEPS WITH RAILING: A LITTLE

## 2020-09-14 ASSESSMENT — LIFESTYLE VARIABLES
HAVE YOU EVER FELT YOU SHOULD CUT DOWN ON YOUR DRINKING: NO
ALCOHOL_USE: NO
AVERAGE NUMBER OF DAYS PER WEEK YOU HAVE A DRINK CONTAINING ALCOHOL: 0
EVER HAD A DRINK FIRST THING IN THE MORNING TO STEADY YOUR NERVES TO GET RID OF A HANGOVER: NO
TOTAL SCORE: 0
EVER FELT BAD OR GUILTY ABOUT YOUR DRINKING: NO
TOTAL SCORE: 0
ON A TYPICAL DAY WHEN YOU DRINK ALCOHOL HOW MANY DRINKS DO YOU HAVE: 0
CONSUMPTION TOTAL: NEGATIVE
HAVE PEOPLE ANNOYED YOU BY CRITICIZING YOUR DRINKING: NO
HOW MANY TIMES IN THE PAST YEAR HAVE YOU HAD 5 OR MORE DRINKS IN A DAY: 0
DOES PATIENT WANT TO STOP DRINKING: NO
TOTAL SCORE: 0

## 2020-09-14 ASSESSMENT — ENCOUNTER SYMPTOMS
FEVER: 1
TREMORS: 0
DEPRESSION: 0
ORTHOPNEA: 0
HEARTBURN: 0
HEMOPTYSIS: 0
EYES NEGATIVE: 1
PALPITATIONS: 0
FOCAL WEAKNESS: 0
HEADACHES: 0
COUGH: 0
CLAUDICATION: 0
MYALGIAS: 1
SPUTUM PRODUCTION: 0
DIZZINESS: 0
VOMITING: 0
DIARRHEA: 0
PSYCHIATRIC NEGATIVE: 1
NAUSEA: 0
SPEECH CHANGE: 0
ABDOMINAL PAIN: 0

## 2020-09-14 ASSESSMENT — PAIN DESCRIPTION - PAIN TYPE
TYPE: ACUTE PAIN
TYPE: ACUTE PAIN
TYPE: CHRONIC PAIN
TYPE: ACUTE PAIN

## 2020-09-14 ASSESSMENT — GAIT ASSESSMENTS
GAIT LEVEL OF ASSIST: SUPERVISED
DISTANCE (FEET): 500

## 2020-09-14 ASSESSMENT — PATIENT HEALTH QUESTIONNAIRE - PHQ9
SUM OF ALL RESPONSES TO PHQ9 QUESTIONS 1 AND 2: 0
1. LITTLE INTEREST OR PLEASURE IN DOING THINGS: NOT AT ALL
2. FEELING DOWN, DEPRESSED, IRRITABLE, OR HOPELESS: NOT AT ALL

## 2020-09-14 ASSESSMENT — ACTIVITIES OF DAILY LIVING (ADL): TOILETING: INDEPENDENT

## 2020-09-14 NOTE — PROGRESS NOTES
2 RN skin check complete.    Devices in place: none  Skin assessed under devices: n/a    Right breast open wound/ hx of abscess.   Left arm bruising     The following interventions in place: wet to dry packing to right breast (picture taken) ,on pressure redistribution mattress, encouraged to turn to sides.    Wound consult ordered: yes by MD

## 2020-09-14 NOTE — CARE PLAN
Problem: Safety  Goal: Will remain free from injury  Outcome: PROGRESSING AS EXPECTED  Note: Pt with generalized weakness and LE extremity swelling. Instructed call light use prior to getting oob to prevent fall. Able to make needs known. Precautions in place.      Problem: Pain Management  Goal: Pain level will decrease to patient's comfort goal  Outcome: PROGRESSING AS EXPECTED  Note: Complaints of generalized pain. Toradol given as ordered with good relief. Resting and calm.

## 2020-09-14 NOTE — ASSESSMENT & PLAN NOTE
Diffuse polyarthritis with worsening inflammatory markers  Suspect underlying undiagnosed rheumatologic/autoimmune process  IV methylpred   IV toradol scheduled  She had workup at OSH so will hold on additional labs    Transition to po steroids as she improves and f/u as outpatient with rheumatology

## 2020-09-14 NOTE — ED NOTES
Med rec complete via interview with pt at bedside. Pt started a 6 day course of Augmentin 2 days ago. Pt has birth control at bedside. Allergies reviewed.    Home pharmacy [facility]

## 2020-09-14 NOTE — PROGRESS NOTES
Pt a&o x4. On RA. Respirations equal and unlabored. Denies pain. Pivots from gurney to bed. Reports generalized pain and weakness. Repositions self in bed. Good po intake without any s/sx of aspiration noted. GARY.  Plan of care reviewed including: right breast wound care, labs, medications, fall precautions. Verbalized understanding and agrees. White board updated. Instructed to use call light prior to getting oob to prevent falls. Able to make needs known.  Call light within reach.

## 2020-09-14 NOTE — ED TRIAGE NOTES
BIB EMS with   Chief Complaint   Patient presents with   • Peripheral Edema   • Joint Pain   Above complaints since 8/14/20. States she was recently admitted  For breast abscess and DC'd 9/11/20. Taking oral antibiotics. States 9/10 unbearable generalized pain. Denies respiratory symptoms, fever, or chills.

## 2020-09-14 NOTE — PROGRESS NOTES
Ambulated to bathroom with one person assist. Pt staggering with ambulation. bilat LE weakness. Left more swollen than right. Reports pain is better.

## 2020-09-14 NOTE — THERAPY
Physical Therapy   Initial Evaluation     Patient Name: Suellen Barbosa  Age:  40 y.o., Sex:  female  Medical Record #: [id number]  Today's Date: 9/14/2020          Assessment  Patient is a 40 y.o. female presenting with c/o incr joint pain and swelling. Pt underwent I&D for R breast abscess on 9/10. Pt had returned home but d/t pain and swelling, was having difficulty mobilizing. Pt seen for PT evaluation at this time. Pt reports the swelling has decreased and she is beginning to feel better. Pt was able to complete all functional mobility without assist. Pt ambulated around unit without AD, no LOB. Pt appears functionally capable of return home at this time. Encouraged continued ambulation while in acute setting. Patient will not be actively followed for physical therapy services at this time, however may be seen if requested by physician for 1 more visit within 30 days to address any discharge or equipment needs.       Plan  Recommend Physical Therapy for Evaluation only   DC Equipment Recommendations: Front-Wheel Walker (for incr safety when negotiating community)  Discharge Recommendations: Anticipate that the patient will have no further physical therapy needs after discharge from the hospital        09/14/20 1107   Prior Living Situation   Prior Services None   Housing / Facility 1 Story House   Steps Into Home 2   Steps In Home 0   Equipment Owned None   Lives with - Patient's Self Care Capacity Spouse;Child Less than 18 Years of Age   Comments spouse able to assist though does work. mother lives next door.    Prior Level of Functional Mobility   Bed Mobility Independent   Transfer Status Independent   Ambulation Independent   Distance Ambulation (Feet) community distances   Assistive Devices Used None   Stairs Independent   Gait Analysis   Gait Level Of Assist Supervised   Assistive Device None   Distance (Feet) 500   Deviation slight incr REAGAN for stability   Weight Bearing Status no restrictions    Comments no LOB. declines stair negotiation and reports no concerns with 2 WILBER at current level of function   Bed Mobility    Supine to Sit Supervised   Sit to Supine Supervised   Scooting Supervised   Functional Mobility   Sit to Stand Supervised

## 2020-09-14 NOTE — THERAPY
"Occupational Therapy   Initial Evaluation     Patient Name: Suellen Barbosa  Age:  40 y.o., Sex:  female  Medical Record #: [id number]  Today's Date: 9/14/2020          Assessment  Patient is a 40 y.o. female who presented to the hospital with joint stiffness and muscle pain. Pt being worked up for possible autoimmune disease. Pt with recent I&D for breast abscess. At the time of OT eval, pt reports her pain and stiffness had resolved following medications and denied any difficulty with ADLs. Pt provided with thorough education on joint protection and activity modifications for times when her joint stiffness and muscle pain interfere with her ADLs.       Plan    Pt with no further skilled OT needs in the acute care setting at this time.              Subjective    \"I feel really good right now.\"     Objective       09/14/20 1153   Prior Living Situation   Prior Services None   Housing / Facility 1 Story House   Steps Into Home 2   Steps In Home 0   Bathroom Set up Bathtub / Shower Combination;Shower Curtain   Equipment Owned None   Lives with - Patient's Self Care Capacity Spouse;Child Less than 18 Years of Age   Comments Pt's spouse works 5 days per week. Pt has 4 children still at home (ages 10, 4, 4, and 3). Her 4 year old twins go to  4 days per week. Pt also has adult daughters who stop by to help as able.    Prior Level of ADL Function   Self Feeding Independent   Grooming / Hygiene Independent   Bathing Independent   Dressing Independent   Toileting Independent   Prior Level of IADL Function   Medication Management Independent   Laundry Independent   Kitchen Mobility Independent   Home Management Independent   Shopping Independent   Prior Level Of Mobility Independent Without Device in Community   Driving / Transportation Driving Independent   Occupation (Pre-Hospital Vocational) Homemaker   ADL Assessment   Comments Pt reports that after receiving medications to address her symptoms of joint pain " and stiffness, she is able to get up and complete her ADLs. Pt reports that she has completed her basic ADLs without assistance today. She demonstrated ability to don and doff her socks. Spent session providing education on ways to modify tasks on days when her joint stiffness is impacting her ability. She verbalized understand of all education and expressed appreciation for the education.    Education Group   Education Provided Joint Protection;Transfers;Home Safety;Role of Occupational Therapist;Activities of Daily Living;Adaptive Equipment

## 2020-09-14 NOTE — H&P
"Hospital Medicine History & Physical Note    Date of Service  2020    PCP: Pcp Pt States None    CC:  \" I hurt all over\"    HPI:   This is a 40 y.o. female with complaints of diffuse body aches and joint pain. Had been worked up for 'autoimmune' disease at outside hospital but she is unable to get those results right now because her cell phone .     Recent I&D Breast abscess, She still packing it herself twice daily.   Sent home on , Augmentin at home. Tolerating ABX.     She is very weak and fatigued, will be only able to do the dishes for example for a short period of time and then incredibly weak and needs to rest.     She is on a methylpred dose pack and she says that's the only thing that helps with the pain and stiffness. She is declining opioids at this time.     Has 7 children, 1 miscarriage. All 7 of her pregnancies she thinks she had high proteinura and pre-eclamptic.     I discussed this patient's case with Dr Gomez of the emergency department.     Labs in the ED  wnl WBC  Hb 10.2  Sed 65  BNP nl  CRP 10.23    Inflammatory markers up from previous earlier this month    Consultants:   None    ROS: As above. The remainder of a complete review of systems is negative in all systems except as noted.    PMHx:   has a past medical history of Anemia and Obesity (BMI 30-39.9) (9/10/2020).    PSHx:   has a past surgical history that includes primary c section; incision and drainage general (Right, 9/10/2020); and breast biopsy (Right, 9/10/2020).    SHx:   reports that she has never smoked. She has never used smokeless tobacco. She reports that she does not drink alcohol or use drugs.    FHx:  1st cousins with \"autoimmune\" disease    Allergies:  No Known Allergies    Medications:  No current facility-administered medications on file prior to encounter.      Current Outpatient Medications on File Prior to Encounter   Medication Sig Dispense Refill   • amoxicillin-clavulanate (AUGMENTIN) 875-125 MG Tab " Take 1 Tab by mouth 2 times a day for 6 days. 12 Tab 0   • folic acid (FOLVITE) 1 MG Tab Take 1 mg by mouth every day.     • norethindrone (AYGESTIN) 5 MG tablet Take 5 mg by mouth every day.     • IRON CHEWS PEDIATRIC 15 MG Chew Tab      • ferrous sulfate 325 (65 Fe) MG tablet          Objective Exam:  Vitals:    09/13/20 1952 09/13/20 2000 09/13/20 2144 09/13/20 2234   BP: 123/65 111/54 116/72 123/71   Pulse: 95 92 81 83   Resp: 16 16 16 16   Temp:       TempSrc:       SpO2: 99% 100% 98% 99%   Weight:       Height:           Physical Exam   Constitutional: She is oriented to person, place, and time. She appears well-developed and well-nourished. No distress.   HENT:   Head: Normocephalic and atraumatic.   Mouth/Throat: Oropharynx is clear and moist.   Eyes: Conjunctivae and EOM are normal. Right eye exhibits no discharge. Left eye exhibits no discharge. No scleral icterus.   Neck: Normal range of motion. Neck supple.   Cardiovascular: Normal rate, regular rhythm and intact distal pulses.   No murmur heard.  Pulmonary/Chest: Effort normal and breath sounds normal. No stridor. No respiratory distress. She has no wheezes. She has no rales. She exhibits no tenderness.   Abdominal: Soft. Bowel sounds are normal. She exhibits no distension. There is no abdominal tenderness. There is no rebound and no guarding.   Musculoskeletal: Normal range of motion.         General: No tenderness, deformity or edema.      Comments: Seems to have nl ROM but c/o joint stiffness, diffuse  No joint effusions   Neurological: She is alert and oriented to person, place, and time. No cranial nerve deficit.   Skin: Skin is warm and dry. No rash noted. She is not diaphoretic. No erythema. No pallor.   Psychiatric: She has a normal mood and affect. Her behavior is normal. Thought content normal.   Nursing note and vitals reviewed.      Laboratory--reviewed personally and are as follows:  Lab Results   Component Value Date/Time    WBC 10.8  09/13/2020 07:48 PM    RBC 4.04 (L) 09/13/2020 07:48 PM    HEMOGLOBIN 10.2 (L) 09/13/2020 07:48 PM    HEMATOCRIT 33.8 (L) 09/13/2020 07:48 PM    MCV 83.7 09/13/2020 07:48 PM    MCH 25.2 (L) 09/13/2020 07:48 PM    MCHC 30.2 (L) 09/13/2020 07:48 PM    MPV 8.1 (L) 09/13/2020 07:48 PM    NEUTSPOLYS 80.30 (H) 09/13/2020 07:48 PM    LYMPHOCYTES 13.80 (L) 09/13/2020 07:48 PM    MONOCYTES 4.40 09/13/2020 07:48 PM    EOSINOPHILS 0.70 09/13/2020 07:48 PM    BASOPHILS 0.20 09/13/2020 07:48 PM      Lab Results   Component Value Date/Time    SODIUM 135 09/13/2020 07:48 PM    POTASSIUM 4.0 09/13/2020 07:48 PM    CHLORIDE 99 09/13/2020 07:48 PM    CO2 23 09/13/2020 07:48 PM    GLUCOSE 112 (H) 09/13/2020 07:48 PM    BUN 10 09/13/2020 07:48 PM    CREATININE 0.97 09/13/2020 07:48 PM      No results found for: PROTHROMBTM, INR     Radiology  No orders to display       Assessment/Plan:  * Joint stiffness  Assessment & Plan  Diffuse polyarthritis with worsening inflammatory markers  Suspect underlying undiagnosed rheumatologic/autoimmune process  IV methylpred to start in AM so she can sleep tonight  IV toradol scheduled  She had workup at OSH so will hold on additional labs for now and she will get results in the AM      Breast abscess  Assessment & Plan  Continue Augmentin x 4 more days  Doing well per patient  Wound care consult    Iron (Fe) deficiency anemia  Assessment & Plan  Chronic process, iron deficiency   Continue supplementation     Debility  Assessment & Plan  Due to joint issues  PT OT consulted    Elevated C-reactive protein (CRP)  Assessment & Plan  See above    Obesity (BMI 30-39.9)- (present on admission)  Assessment & Plan  Complicating all aspects of care    Anemia- (present on admission)  Assessment & Plan  Chronic process, iron deficiency   Continue supplementation        It is expected patient will stay beyond 2 midnights.    VTE prophylaxis: lmwh

## 2020-09-14 NOTE — ED PROVIDER NOTES
"ED Provider Note    Scribed for Dr. Samuel Gomez M.D. by Yudith Michelle. 9/13/2020  6:54 PM    Primary care provider: None reported  Means of arrival: EMS  History obtained from: Patient  History limited by: None    CHIEF COMPLAINT  Chief Complaint   Patient presents with   • Peripheral Edema   • Joint Pain       [facility]  Suellen Barbosa is a 40 y.o. female who presents to the Emergency Department via EMS for peripheral edema, difficulty ambulating, and generalized muscle and joint pain onset 1 month ago. She was discharged from the hospital 2 days ago after being treated for a breast abscess. The abscess was surgically drained and she was treated with IV antibiotics. She was sent home with amoxicillin which she last took this morning. She had these symptoms during her stay in the hospital and states they have not gotten  r worse. She has been changing the dressing for her incision every 12 hours and states she is \"not bothered by it\". However, she states that \"everything hurts to the touch\".  The generalized aching and joint pain is debilitating for the patient patient denies any cough or fever.    REVIEW OF SYSTEMS  Pertinent positives include peripheral edema, joint pain, difficultly ambulating, generalized muscle and joint pain. Pertinent negatives include no cough or fever. As above, all other systems reviewed and are negative.   See HPI for further details.     PAST MEDICAL HISTORY   has a past medical history of Anemia and Obesity (BMI 30-39.9) (9/10/2020).    SURGICAL HISTORY   has a past surgical history that includes primary c section; incision and drainage general (Right, 9/10/2020); and breast biopsy (Right, 9/10/2020).    SOCIAL HISTORY  Social History     Tobacco Use   • Smoking status: Never Smoker   • Smokeless tobacco: Never Used   Substance Use Topics   • Alcohol use: Never     Frequency: Never   • Drug use: Never      Social History     Substance and Sexual Activity   Drug Use Never " "      FAMILY HISTORY  None reported.    CURRENT MEDICATIONS  Home Medications     Reviewed by Butch Ware (Pharmacy Tech) on 09/13/20 at 2221  Med List Status: Complete   Medication Last Dose Status   amoxicillin-clavulanate (AUGMENTIN) 875-125 MG Tab 9/13/2020 Active   ferrous sulfate 325 (65 Fe) MG tablet 9/12/2020 Active   folic acid (FOLVITE) 1 MG Tab 9/13/2020 Active   IRON CHEWS PEDIATRIC 15 MG Chew Tab 9/13/2020 Active   norethindrone (AYGESTIN) 5 MG tablet 9/13/2020 Active                ALLERGIES  No Known Allergies    PHYSICAL EXAM  VITAL SIGNS: /76   Pulse (!) 104   Temp 36.8 °C (98.2 °F) (Temporal)   Resp 16   Ht 1.753 m (5' 9\")   Wt 105.2 kg (231 lb 14.8 oz)   LMP 08/26/2020 (Approximate)   SpO2 97%   BMI 34.25 kg/m²     Constitutional: Well developed, Well nourished,   mild distress Nal ears normal, Oropharynx moist, No oral exudates.   Eyes: PERRLA, EOMI, Conjunctiva normal, No discharge.   Neck: No tenderness, Supple, No stridor.   Lymphatic: No lymphadenopathy noted.   Cardiovascular: Normal heart rate, Normal rhythm.   Thorax & Lungs: Clear to auscultation bilaterally, No respiratory distress, No wheezing, No crackles.   Abdomen: Soft, No tenderness, No masses, No pulsatile masses.   Skin: Right breast has previously drained abscess on outer portion with packing in place, no obvious drainage, wound edges appear granulated and non-inflamed.  Extremities:, No edema No cyanosis.   Musculoskeletal: Diffuse muscle and joint tenderness. No major deformities noted.  Intact distal pulses  Neurologic: Awake, alert. Moves all extremities spontaneously.  Psychiatric: Affect normal, Judgment normal, Mood normal.     LABS  Results for orders placed or performed during the hospital encounter of 09/13/20   CBC WITH DIFFERENTIAL   Result Value Ref Range    WBC 10.8 4.8 - 10.8 K/uL    RBC 4.04 (L) 4.20 - 5.40 M/uL    Hemoglobin 10.2 (L) 12.0 - 16.0 g/dL    Hematocrit 33.8 (L) 37.0 - 47.0 % "    MCV 83.7 81.4 - 97.8 fL    MCH 25.2 (L) 27.0 - 33.0 pg    MCHC 30.2 (L) 33.6 - 35.0 g/dL    RDW 59.3 (H) 35.9 - 50.0 fL    Platelet Count 426 164 - 446 K/uL    MPV 8.1 (L) 9.0 - 12.9 fL    Neutrophils-Polys 80.30 (H) 44.00 - 72.00 %    Lymphocytes 13.80 (L) 22.00 - 41.00 %    Monocytes 4.40 0.00 - 13.40 %    Eosinophils 0.70 0.00 - 6.90 %    Basophils 0.20 0.00 - 1.80 %    Immature Granulocytes 0.60 0.00 - 0.90 %    Nucleated RBC 0.00 /100 WBC    Neutrophils (Absolute) 8.65 (H) 2.00 - 7.15 K/uL    Lymphs (Absolute) 1.49 1.00 - 4.80 K/uL    Monos (Absolute) 0.47 0.00 - 0.85 K/uL    Eos (Absolute) 0.08 0.00 - 0.51 K/uL    Baso (Absolute) 0.02 0.00 - 0.12 K/uL    Immature Granulocytes (abs) 0.06 0.00 - 0.11 K/uL    NRBC (Absolute) 0.00 K/uL   COMP METABOLIC PANEL   Result Value Ref Range    Sodium 135 135 - 145 mmol/L    Potassium 4.0 3.6 - 5.5 mmol/L    Chloride 99 96 - 112 mmol/L    Co2 23 20 - 33 mmol/L    Anion Gap 13.0 7.0 - 16.0    Glucose 112 (H) 65 - 99 mg/dL    Bun 10 8 - 22 mg/dL    Creatinine 0.97 0.50 - 1.40 mg/dL    Calcium 9.3 8.5 - 10.5 mg/dL    AST(SGOT) 13 12 - 45 U/L    ALT(SGPT) 28 2 - 50 U/L    Alkaline Phosphatase 89 30 - 99 U/L    Total Bilirubin 0.2 0.1 - 1.5 mg/dL    Albumin 4.0 3.2 - 4.9 g/dL    Total Protein 7.8 6.0 - 8.2 g/dL    Globulin 3.8 (H) 1.9 - 3.5 g/dL    A-G Ratio 1.1 g/dL   LACTIC ACID   Result Value Ref Range    Lactic Acid 1.0 0.5 - 2.0 mmol/L   CRP QUANTITIVE (NON-CARDIAC)   Result Value Ref Range    Stat C-Reactive Protein 10.23 (H) 0.00 - 0.75 mg/dL   Sed Rate   Result Value Ref Range    Sed Rate Westergren 65 (H) 0 - 20 mm/hour   ESTIMATED GFR   Result Value Ref Range    GFR If African American >60 >60 mL/min/1.73 m 2    GFR If Non African American >60 >60 mL/min/1.73 m 2   CREATINE KINASE   Result Value Ref Range    CPK Total 20 0 - 154 U/L   proBrain Natriuretic Peptide, NT   Result Value Ref Range    NT-proBNP 52 0 - 125 pg/mL       All labs reviewed by me.    COURSE &  MEDICAL DECISION MAKING  Pertinent Labs & Imaging studies reviewed. (See chart for details)    6:54 PM - Patient seen and examined at bedside. Ordered lactic acid, blood culture, UA, CBC with diff, CMP, CRP karon, and sed rate to evaluate her symptoms. The differential diagnoses include but are not limited to: sepsis vs recurrent breast abscess.    8:50 PM - Patient seen and examined at bedside. Patient reports bilateral flank pain.    10:00 PM I discussed the patient's case and the above findings with Dr. Godinez (Hospitalist) who agreed to accept the patient.    Decision Making:  Patient with generalized body aches increased inflammatory markers of uncertain cause.  She may well have some sort of autoimmune disorder.  There is some concern that she might have recurring sepsis patient was admitted recently with breast abscess which was surgically drained and was on IV antibiotics.  She was sent home on oral antibiotics and the body aching is increasing although the breast abscess appears to be improving.  She is been treated with multiple doses of steroids does improve her symptoms.  The patient is dilatated with this last hospitalist to admit for further work-up and management.    DISPOSITION:  Patient will be hospitalized by Dr. Godinez in guarded condition.      FINAL IMPRESSION  1. Polyarthritis    2. Arthralgia, unspecified joint          Yudith NOWAK (Rylie), am scribing for, and in the presence of, Samuel Gomez M.D..    Electronically signed by: Yudith Michelle (Rylie), 9/13/2020    Samuel NOWAK M.D. personally performed the services described in this documentation, as scribed by Yudith Michelle in my presence, and it is both accurate and complete. C    The note accurately reflects work and decisions made by me.  Samuel Gomez M.D.  9/13/2020  10:56 PM

## 2020-09-15 ENCOUNTER — PATIENT OUTREACH (OUTPATIENT)
Dept: HEALTH INFORMATION MANAGEMENT | Facility: OTHER | Age: 40
End: 2020-09-15

## 2020-09-15 ENCOUNTER — PHARMACY VISIT (OUTPATIENT)
Dept: PHARMACY | Facility: MEDICAL CENTER | Age: 40
End: 2020-09-15
Payer: COMMERCIAL

## 2020-09-15 VITALS
WEIGHT: 240.74 LBS | DIASTOLIC BLOOD PRESSURE: 60 MMHG | OXYGEN SATURATION: 96 % | BODY MASS INDEX: 35.66 KG/M2 | HEIGHT: 69 IN | HEART RATE: 68 BPM | SYSTOLIC BLOOD PRESSURE: 108 MMHG | RESPIRATION RATE: 14 BRPM | TEMPERATURE: 97.3 F

## 2020-09-15 PROBLEM — R53.81 DEBILITY: Status: RESOLVED | Noted: 2020-09-13 | Resolved: 2020-09-15

## 2020-09-15 LAB
ALBUMIN SERPL BCP-MCNC: 3.4 G/DL (ref 3.2–4.9)
ALBUMIN/GLOB SERPL: 0.9 G/DL
ALP SERPL-CCNC: 73 U/L (ref 30–99)
ALT SERPL-CCNC: 36 U/L (ref 2–50)
ANION GAP SERPL CALC-SCNC: 15 MMOL/L (ref 7–16)
AST SERPL-CCNC: 15 U/L (ref 12–45)
BACTERIA BLD CULT: NORMAL
BACTERIA BLD CULT: NORMAL
BASOPHILS # BLD AUTO: 0.1 % (ref 0–1.8)
BASOPHILS # BLD: 0.01 K/UL (ref 0–0.12)
BILIRUB SERPL-MCNC: <0.2 MG/DL (ref 0.1–1.5)
BUN SERPL-MCNC: 15 MG/DL (ref 8–22)
CALCIUM SERPL-MCNC: 9.3 MG/DL (ref 8.5–10.5)
CHLORIDE SERPL-SCNC: 103 MMOL/L (ref 96–112)
CO2 SERPL-SCNC: 19 MMOL/L (ref 20–33)
CREAT SERPL-MCNC: 0.69 MG/DL (ref 0.5–1.4)
CRP SERPL HS-MCNC: 6.5 MG/DL (ref 0–0.75)
EOSINOPHIL # BLD AUTO: 0 K/UL (ref 0–0.51)
EOSINOPHIL NFR BLD: 0 % (ref 0–6.9)
ERYTHROCYTE [DISTWIDTH] IN BLOOD BY AUTOMATED COUNT: 57.1 FL (ref 35.9–50)
ERYTHROCYTE [SEDIMENTATION RATE] IN BLOOD BY WESTERGREN METHOD: 68 MM/HOUR (ref 0–20)
GLOBULIN SER CALC-MCNC: 3.6 G/DL (ref 1.9–3.5)
GLUCOSE SERPL-MCNC: 133 MG/DL (ref 65–99)
HCT VFR BLD AUTO: 28.7 % (ref 37–47)
HGB BLD-MCNC: 8.7 G/DL (ref 12–16)
IMM GRANULOCYTES # BLD AUTO: 0.07 K/UL (ref 0–0.11)
IMM GRANULOCYTES NFR BLD AUTO: 0.6 % (ref 0–0.9)
LYMPHOCYTES # BLD AUTO: 0.72 K/UL (ref 1–4.8)
LYMPHOCYTES NFR BLD: 6.5 % (ref 22–41)
MCH RBC QN AUTO: 25.4 PG (ref 27–33)
MCHC RBC AUTO-ENTMCNC: 30.3 G/DL (ref 33.6–35)
MCV RBC AUTO: 83.7 FL (ref 81.4–97.8)
MONOCYTES # BLD AUTO: 0.41 K/UL (ref 0–0.85)
MONOCYTES NFR BLD AUTO: 3.7 % (ref 0–13.4)
NEUTROPHILS # BLD AUTO: 9.8 K/UL (ref 2–7.15)
NEUTROPHILS NFR BLD: 89.1 % (ref 44–72)
NRBC # BLD AUTO: 0 K/UL
NRBC BLD-RTO: 0 /100 WBC
PLATELET # BLD AUTO: 430 K/UL (ref 164–446)
PMV BLD AUTO: 8.9 FL (ref 9–12.9)
POTASSIUM SERPL-SCNC: 4.4 MMOL/L (ref 3.6–5.5)
PROT SERPL-MCNC: 7 G/DL (ref 6–8.2)
RBC # BLD AUTO: 3.43 M/UL (ref 4.2–5.4)
RHEUMATOID FACT SER IA-ACNC: 13 IU/ML (ref 0–14)
SIGNIFICANT IND 70042: NORMAL
SIGNIFICANT IND 70042: NORMAL
SITE SITE: NORMAL
SITE SITE: NORMAL
SODIUM SERPL-SCNC: 137 MMOL/L (ref 135–145)
SOURCE SOURCE: NORMAL
SOURCE SOURCE: NORMAL
WBC # BLD AUTO: 11 K/UL (ref 4.8–10.8)

## 2020-09-15 PROCEDURE — 700111 HCHG RX REV CODE 636 W/ 250 OVERRIDE (IP): Performed by: HOSPITALIST

## 2020-09-15 PROCEDURE — 80053 COMPREHEN METABOLIC PANEL: CPT

## 2020-09-15 PROCEDURE — 85652 RBC SED RATE AUTOMATED: CPT

## 2020-09-15 PROCEDURE — 86200 CCP ANTIBODY: CPT

## 2020-09-15 PROCEDURE — G0378 HOSPITAL OBSERVATION PER HR: HCPCS

## 2020-09-15 PROCEDURE — 700102 HCHG RX REV CODE 250 W/ 637 OVERRIDE(OP): Performed by: INTERNAL MEDICINE

## 2020-09-15 PROCEDURE — 700102 HCHG RX REV CODE 250 W/ 637 OVERRIDE(OP): Performed by: HOSPITALIST

## 2020-09-15 PROCEDURE — A9270 NON-COVERED ITEM OR SERVICE: HCPCS | Performed by: HOSPITALIST

## 2020-09-15 PROCEDURE — 99217 PR OBSERVATION CARE DISCHARGE: CPT | Performed by: INTERNAL MEDICINE

## 2020-09-15 PROCEDURE — 96372 THER/PROPH/DIAG INJ SC/IM: CPT

## 2020-09-15 PROCEDURE — 86431 RHEUMATOID FACTOR QUANT: CPT

## 2020-09-15 PROCEDURE — A9270 NON-COVERED ITEM OR SERVICE: HCPCS | Performed by: INTERNAL MEDICINE

## 2020-09-15 PROCEDURE — 96376 TX/PRO/DX INJ SAME DRUG ADON: CPT

## 2020-09-15 PROCEDURE — 86225 DNA ANTIBODY NATIVE: CPT

## 2020-09-15 PROCEDURE — 85025 COMPLETE CBC W/AUTO DIFF WBC: CPT

## 2020-09-15 PROCEDURE — 86038 ANTINUCLEAR ANTIBODIES: CPT

## 2020-09-15 PROCEDURE — 86140 C-REACTIVE PROTEIN: CPT

## 2020-09-15 PROCEDURE — RXMED WILLOW AMBULATORY MEDICATION CHARGE: Performed by: INTERNAL MEDICINE

## 2020-09-15 RX ORDER — TRAMADOL HYDROCHLORIDE 50 MG/1
50 TABLET ORAL EVERY 8 HOURS PRN
Qty: 15 TAB | Refills: 0 | Status: SHIPPED | OUTPATIENT
Start: 2020-09-15 | End: 2020-09-20

## 2020-09-15 RX ORDER — OXYCODONE HYDROCHLORIDE 5 MG/1
5 TABLET ORAL EVERY 4 HOURS PRN
Status: DISCONTINUED | OUTPATIENT
Start: 2020-09-15 | End: 2020-09-15 | Stop reason: HOSPADM

## 2020-09-15 RX ORDER — IBUPROFEN 400 MG/1
400 TABLET ORAL EVERY 8 HOURS PRN
Qty: 15 TAB | Refills: 0 | Status: SHIPPED
Start: 2020-09-15 | End: 2020-10-20

## 2020-09-15 RX ORDER — OMEPRAZOLE 20 MG/1
40 CAPSULE, DELAYED RELEASE ORAL DAILY
Status: DISCONTINUED | OUTPATIENT
Start: 2020-09-15 | End: 2020-09-15 | Stop reason: HOSPADM

## 2020-09-15 RX ORDER — OMEPRAZOLE 40 MG/1
40 CAPSULE, DELAYED RELEASE ORAL DAILY
Qty: 30 CAP | Refills: 1 | Status: SHIPPED
Start: 2020-09-16 | End: 2021-05-24

## 2020-09-15 RX ORDER — PREDNISONE 20 MG/1
60 TABLET ORAL DAILY
Status: DISCONTINUED | OUTPATIENT
Start: 2020-09-15 | End: 2020-09-15 | Stop reason: HOSPADM

## 2020-09-15 RX ORDER — PREDNISONE 20 MG/1
TABLET ORAL
Qty: 35 TAB | Refills: 0 | Status: SHIPPED
Start: 2020-09-15 | End: 2020-09-23

## 2020-09-15 RX ADMIN — ENOXAPARIN SODIUM 40 MG: 40 INJECTION SUBCUTANEOUS at 04:55

## 2020-09-15 RX ADMIN — METHYLPREDNISOLONE SODIUM SUCCINATE 40 MG: 40 INJECTION, POWDER, FOR SOLUTION INTRAMUSCULAR; INTRAVENOUS at 04:54

## 2020-09-15 RX ADMIN — OMEPRAZOLE 40 MG: 20 CAPSULE, DELAYED RELEASE ORAL at 09:53

## 2020-09-15 RX ADMIN — KETOROLAC TROMETHAMINE 30 MG: 30 INJECTION, SOLUTION INTRAMUSCULAR at 04:54

## 2020-09-15 RX ADMIN — AMOXICILLIN AND CLAVULANATE POTASSIUM 1 TABLET: 875; 125 TABLET, FILM COATED ORAL at 09:53

## 2020-09-15 RX ADMIN — FERROUS SULFATE TAB 325 MG (65 MG ELEMENTAL FE) 325 MG: 325 (65 FE) TAB at 09:53

## 2020-09-15 ASSESSMENT — PAIN DESCRIPTION - PAIN TYPE: TYPE: ACUTE PAIN

## 2020-09-15 NOTE — DISCHARGE INSTRUCTIONS
Discharge Instructions    Discharged to home by car with relative. Discharged via wheelchair, hospital escort: Yes.  Special equipment needed: Not Applicable    Be sure to schedule a follow-up appointment with your primary care doctor or any specialists as instructed.     Discharge Plan:   Diet Plan: Discussed  Activity Level: Discussed  Confirmed Follow up Appointment: Patient to Call and Schedule Appointment  Confirmed Symptoms Management: Discussed  Medication Reconciliation Updated: Yes    I understand that a diet low in cholesterol, fat, and sodium is recommended for good health. Unless I have been given specific instructions below for another diet, I accept this instruction as my diet prescription.   Other diet: heart healthy    Special Instructions: None    · Is patient discharged on Warfarin / Coumadin?   No     Depression / Suicide Risk    As you are discharged from this [facility] facility, it is important to learn how to keep safe from harming yourself.    Recognize the warning signs:  · Abrupt changes in personality, positive or negative- including increase in energy   · Giving away possessions  · Change in eating patterns- significant weight changes-  positive or negative  · Change in sleeping patterns- unable to sleep or sleeping all the time   · Unwillingness or inability to communicate  · Depression  · Unusual sadness, discouragement and loneliness  · Talk of wanting to die  · Neglect of personal appearance   · Rebelliousness- reckless behavior  · Withdrawal from people/activities they love  · Confusion- inability to concentrate     If you or a loved one observes any of these behaviors or has concerns about self-harm, here's what you can do:  · Talk about it- your feelings and reasons for harming yourself  · Remove any means that you might use to hurt yourself (examples: pills, rope, extension cords, firearm)  · Get professional help from the community (Mental Health, Substance Abuse,  psychological counseling)  · Do not be alone:Call your Safe Contact- someone whom you trust who will be there for you.  · Call your local CRISIS HOTLINE 109-2241 or 347-553-0724  · Call your local Children's Mobile Crisis Response Team [place] (206) 117-9137 or www.[employer]  · Call the toll free National Suicide Prevention Hotlines   · National Suicide Prevention Lifeline 050-951-VJBE (9196)  · National Hope Line Network 800-SUICIDE (133-0132)

## 2020-09-15 NOTE — CARE PLAN
Problem: Pain Management  Goal: Pain level will decrease to patient's comfort goal  Outcome: PROGRESSING AS EXPECTED  Intervention: Follow pain managment plan developed in collaboration with patient and Interdisciplinary Team  Note: Denies pain med needs. Resting and calm.      Problem: Respiratory:  Goal: Respiratory status will improve  Outcome: PROGRESSING AS EXPECTED  Intervention: Assess and monitor pulmonary status  Note: Pt on RA. Respirations equal and unlabored. No SOB.

## 2020-09-15 NOTE — CARE PLAN
Problem: Communication  Goal: The ability to communicate needs accurately and effectively will improve  Outcome: PROGRESSING AS EXPECTED     Problem: Safety  Goal: Will remain free from injury  Outcome: PROGRESSING AS EXPECTED     Problem: Pain Management  Goal: Pain level will decrease to patient's comfort goal  Outcome: PROGRESSING AS EXPECTED     Problem: Infection  Goal: Will remain free from infection  Outcome: PROGRESSING AS EXPECTED     Problem: Psychosocial Needs:  Goal: Level of anxiety will decrease  Outcome: PROGRESSING AS EXPECTED     Problem: Respiratory:  Goal: Respiratory status will improve  Outcome: PROGRESSING AS EXPECTED

## 2020-09-15 NOTE — DISCHARGE SUMMARY
Discharge Summary    CHIEF COMPLAINT ON ADMISSION  Chief Complaint   Patient presents with   • Peripheral Edema   • Joint Pain       Reason for Admission  Joint Pain/possible rheumatic arthritis    Admission Date  9/13/2020    CODE STATUS  Full Code    HPI & HOSPITAL COURSE    40-year-old female with no significant past medical history presented to the hospital on 9/13 with diffuse body ache and joints pain, started couple weeks before this admission with many ED visits, she states she had work-up for autoimmune disease in an  outside facility, denied fever all chills and no rash, the patient denied any shortness of breath or chest pain, labs showed chronic anemia with no leukocytosis and normal kidney and liver function, however CRP was mildly elevated, Solu-Medrol 40 mg twice daily IV was started with significant improving in her symptoms, pain and swelling, switched to oral prednisone 60 mg, we ordered autoimmune antibodies including JEANMARIE, rheumatoid factor, CCP, and dsDNA, to follow-up with primary care doctor and rheumatology referral.     The patient had small abscess on her right breast, she had I&D and she was treated with Augmentin with improving, the patient has an appointment with her surgeon next week.  The patient will be discharged on prednisone 60 mg and tramadol with Advil for pain management, to follow-up closely with primary care doctor next week and rheumatology referral.     I had long discussion with the patient about the importance of following with primary care doctor and rheumatologist for possible rheumatic arthritis and I discussed the patient the risk/benefit from prednisone including not limited to Cushing syndrome, hypertension and osteoporosis the patient understood and she is willing to follow-up closely with primary care.        Therefore, she is discharged in good and stable condition to home with close outpatient follow-up.    The patient recovered much more quickly than anticipated  on admission.    Discharge Date  09/15/20      FOLLOW UP ITEMS POST DISCHARGE  Follow-up with primary care doctor and rheumatologist for joint pain and labs.    Up with surgeon for breast abscess.    DISCHARGE DIAGNOSES  Principal Problem:    Joint stiffness POA: Unknown  Active Problems:    Breast abscess POA: Yes    Anemia POA: Yes    Obesity (BMI 30-39.9) POA: Yes    Elevated C-reactive protein (CRP) POA: Unknown    Iron (Fe) deficiency anemia POA: Yes  Resolved Problems:    Debility POA: Yes      FOLLOW UP  No future appointments.  [address] [address]  [phone]  Schedule an appointment as soon as possible for a visit  Please call ASAP to establish with a Primary Care Physician. If you are unable to call please walk in at 9:00 am to be seen as a walk in appointment. Thank you!      MEDICATIONS ON DISCHARGE     Medication List      START taking these medications      Instructions   ibuprofen 400 MG Tabs  Commonly known as: IBU   Take 1 Tab by mouth every 8 hours as needed for Moderate Pain.  Dose: 400 mg     omeprazole 40 MG delayed-release capsule  Start taking on: September 16, 2020  Commonly known as: PRILOSEC   Take 1 Cap by mouth every day.  Dose: 40 mg     predniSONE 20 MG Tabs  Start taking on: September 15, 2020  Commonly known as: DELTASONE   Take 3 Tabs by mouth every day for 7 days, THEN 2 Tabs every day for 7 days.     tramadol 50 MG Tabs  Commonly known as: ULTRAM   Take 1 Tab by mouth every 8 hours as needed for Severe Pain for up to 5 days.  Dose: 50 mg        CONTINUE taking these medications      Instructions   acetaminophen 325 MG Tabs  Commonly known as: TYLENOL   Take 650 mg by mouth every 6 hours as needed.  Dose: 650 mg     amoxicillin-clavulanate 875-125 MG Tabs  Commonly known as: AUGMENTIN   Take 1 Tab by mouth 2 times a day for 6 days.  Dose: 1 Tab     ferrous sulfate 325 (65 Fe) MG tablet      folic acid 1 MG Tabs  Commonly known as:  FOLVITE   Take 1 mg by mouth every day.  Dose: 1 mg     Iron Chews Pediatric 15 MG Chew  Generic drug: Carbonyl Iron      norethindrone 5 MG tablet  Commonly known as: AYGESTIN   Take 5 mg by mouth every day.  Dose: 5 mg            Allergies  No Known Allergies    DIET  Orders Placed This Encounter   Procedures   • Diet Order Regular (prefers chicken and fish for meals please. )     Standing Status:   Standing     Number of Occurrences:   1     Order Specific Question:   Diet:     Answer:   Regular [1]     Comments:   prefers chicken and fish for meals please.        ACTIVITY  As tolerated.  Weight bearing as tolerated    CONSULTATIONS  None     PROCEDURES  None     LABORATORY  Lab Results   Component Value Date    SODIUM 137 09/15/2020    POTASSIUM 4.4 09/15/2020    CHLORIDE 103 09/15/2020    CO2 19 (L) 09/15/2020    GLUCOSE 133 (H) 09/15/2020    BUN 15 09/15/2020    CREATININE 0.69 09/15/2020        Lab Results   Component Value Date    WBC 11.0 (H) 09/15/2020    HEMOGLOBIN 8.7 (L) 09/15/2020    HEMATOCRIT 28.7 (L) 09/15/2020    PLATELETCT 430 09/15/2020        Total time of the discharge process exceeds 34 minutes.

## 2020-09-15 NOTE — PROGRESS NOTES
[facility] Medicine Daily Progress Note    Date of Service  9/14/2020    Chief Complaint  40 y.o. female admitted 9/13/2020 with joint pain    Recent breast abscess    Hospital Course          Interval Problem Update  Diffuse muscle and joint pain    On iv steroids    Patient states right breast infection is getting better    Consultants/Specialty  home    Code Status  Full Code    Disposition  home    Review of Systems  Review of Systems   Constitutional: Positive for fever and malaise/fatigue.   HENT: Negative for hearing loss and tinnitus.    Eyes: Negative.    Respiratory: Negative for cough, hemoptysis and sputum production.    Cardiovascular: Negative for chest pain, palpitations, orthopnea, claudication and leg swelling.   Gastrointestinal: Negative for abdominal pain, diarrhea, heartburn, nausea and vomiting.   Genitourinary: Negative for dysuria, frequency and urgency.   Musculoskeletal: Positive for joint pain and myalgias.   Skin: Negative for itching and rash.   Neurological: Negative for dizziness, tremors, speech change, focal weakness and headaches.   Psychiatric/Behavioral: Negative.  Negative for depression.        Physical Exam  Temp:  [36 °C (96.8 °F)-36.5 °C (97.7 °F)] 36 °C (96.8 °F)  Pulse:  [75-88] 86  Resp:  [16-18] 16  BP: ()/(51-72) 124/60  SpO2:  [98 %-99 %] 98 %    Physical Exam  Constitutional:       General: She is not in acute distress.     Appearance: She is not ill-appearing, toxic-appearing or diaphoretic.   HENT:      Head: Atraumatic.      Mouth/Throat:      Mouth: Mucous membranes are moist.   Eyes:      Extraocular Movements: Extraocular movements intact.      Pupils: Pupils are equal, round, and reactive to light.   Neck:      Musculoskeletal: Normal range of motion. No neck rigidity or muscular tenderness.   Cardiovascular:      Rate and Rhythm: Normal rate and regular rhythm.      Pulses: Normal pulses.      Heart sounds: Normal heart sounds.   Pulmonary:      Effort:  Pulmonary effort is normal. No respiratory distress.      Breath sounds: Normal breath sounds. No stridor. No wheezing or rhonchi.   Chest:      Chest wall: No tenderness.   Abdominal:      General: Abdomen is flat. There is no distension.      Palpations: There is no mass.      Tenderness: There is no abdominal tenderness. There is no guarding.   Musculoskeletal:         General: Swelling present. No deformity.      Right lower leg: No edema.      Left lower leg: No edema.      Comments: Right breast induration   Skin:     General: Skin is warm and dry.      Coloration: Skin is not jaundiced or pale.      Findings: No bruising or lesion.   Neurological:      General: No focal deficit present.      Mental Status: She is alert and oriented to person, place, and time.      Cranial Nerves: No cranial nerve deficit.      Sensory: No sensory deficit.      Motor: No weakness.      Coordination: Coordination normal.   Psychiatric:         Mood and Affect: Mood normal.         Fluids    Intake/Output Summary (Last 24 hours) at 9/14/2020 2023  Last data filed at 9/14/2020 0300  Gross per 24 hour   Intake 300 ml   Output --   Net 300 ml       Laboratory  Recent Labs     09/13/20 1948 09/14/20  0642   WBC 10.8 8.3   RBC 4.04* 3.59*   HEMOGLOBIN 10.2* 8.9*   HEMATOCRIT 33.8* 30.4*   MCV 83.7 84.7   MCH 25.2* 24.8*   MCHC 30.2* 29.3*   RDW 59.3* 59.4*   PLATELETCT 426 391   MPV 8.1* 8.5*     Recent Labs     09/13/20 1948 09/14/20  0642   SODIUM 135 134*   POTASSIUM 4.0 4.2   CHLORIDE 99 102   CO2 23 20   GLUCOSE 112* 95   BUN 10 12   CREATININE 0.97 0.92   CALCIUM 9.3 8.9                   Imaging  No orders to display        Assessment/Plan  * Joint stiffness  Assessment & Plan  Diffuse polyarthritis with worsening inflammatory markers  Suspect underlying undiagnosed rheumatologic/autoimmune process  IV methylpred   IV toradol scheduled  She had workup at OSH so will hold on additional labs    Transition to po steroids as she  improves and f/u as outpatient with rheumatology      Breast abscess- (present on admission)  Assessment & Plan  Continue Augmentin x 4 more days  Doing well per patient  Wound care consult    Iron (Fe) deficiency anemia- (present on admission)  Assessment & Plan  Chronic process, iron deficiency   Continue supplementation     Debility- (present on admission)  Assessment & Plan  Due to joint issues  PT OT consulted--> walker    Elevated C-reactive protein (CRP)  Assessment & Plan  See above    Obesity (BMI 30-39.9)- (present on admission)  Assessment & Plan  Weight loss and exercise recommended    Anemia- (present on admission)  Assessment & Plan  Chronic process, iron deficiency   Continue supplementation        VTE prophylaxis: scd

## 2020-09-15 NOTE — WOUND TEAM
Wound team at bedside for consult for right breast abscess. Wound is packed per Dr. Blanca's previous instructions when patient was discharged last week. Patient has been packing with plain strip packing BID at home and has follow up this Thursday with Dr. Blanca. Patient declined dressing change as it was changed this morning, she is dressed and ready to be discharged. Additional supplies given to patient for home dressing changes and instructed to keep follow up appointment with Dr. Blanca this Thursday.

## 2020-09-15 NOTE — PROGRESS NOTES
Complaints of right anterior foot swelling. Mehdi hose given. COOKIE. Up to bathroom with one person assist. Denies any pain med. Able to make needs known.

## 2020-09-15 NOTE — PROGRESS NOTES
IV Dc 'd. Discharge instructions provided to patient. Pt verbalizes understanding. Pt states all questions have been answered. Copy of DC provided to patient. Signed copy in chart. Patient picking up prescriptions at [address] [place] [address]. Pt states all personal belongings are in possession.  Pt escorted off unit by tech via w/c without incident.  providing transportation.

## 2020-09-15 NOTE — PROGRESS NOTES
Assessment completed. Pt A&Ox4. Respirations are even and unlabored on RA. Pt reports 5/10 abdominal cramping at this time, tolerable, snacks given per request. Swelling to BLE decreased, able to ambulate with SBA. Denies nausea. Monitors applied, VS stable, call light and belongings within reach. POC updated (possible d/c, pain control). Pt educated on room and call light, pt verbalized understanding. Communication board updated. Needs met.

## 2020-09-16 LAB
BACTERIA SPEC ANAEROBE CULT: NORMAL
SIGNIFICANT IND 70042: NORMAL
SITE SITE: NORMAL
SOURCE SOURCE: NORMAL

## 2020-09-17 LAB
CCP IGG SERPL-ACNC: 7 UNITS (ref 0–19)
DSDNA AB TITR SER CLIF: NORMAL {TITER}
NUCLEAR IGG SER QL IA: NORMAL

## 2020-09-18 LAB
BACTERIA BLD CULT: NORMAL
SIGNIFICANT IND 70042: NORMAL
SITE SITE: NORMAL
SOURCE SOURCE: NORMAL

## 2020-09-19 LAB
BACTERIA BLD CULT: NORMAL
SIGNIFICANT IND 70042: NORMAL
SITE SITE: NORMAL
SOURCE SOURCE: NORMAL

## 2020-09-23 ENCOUNTER — HOSPITAL ENCOUNTER (EMERGENCY)
Facility: MEDICAL CENTER | Age: 40
End: 2020-09-23
Attending: EMERGENCY MEDICINE
Payer: MEDICAID

## 2020-09-23 VITALS
DIASTOLIC BLOOD PRESSURE: 66 MMHG | TEMPERATURE: 97.3 F | RESPIRATION RATE: 19 BRPM | OXYGEN SATURATION: 98 % | HEIGHT: 69 IN | HEART RATE: 70 BPM | BODY MASS INDEX: 35.55 KG/M2 | SYSTOLIC BLOOD PRESSURE: 114 MMHG | WEIGHT: 240 LBS

## 2020-09-23 DIAGNOSIS — R20.2 PARESTHESIAS: ICD-10-CM

## 2020-09-23 DIAGNOSIS — R20.0 NUMBNESS: ICD-10-CM

## 2020-09-23 LAB
ALBUMIN SERPL BCP-MCNC: 4.1 G/DL (ref 3.2–4.9)
ALBUMIN/GLOB SERPL: 1.4 G/DL
ALP SERPL-CCNC: 64 U/L (ref 30–99)
ALT SERPL-CCNC: 64 U/L (ref 2–50)
ANION GAP SERPL CALC-SCNC: 13 MMOL/L (ref 7–16)
ANISOCYTOSIS BLD QL SMEAR: ABNORMAL
AST SERPL-CCNC: 10 U/L (ref 12–45)
BASOPHILS # BLD AUTO: 0 % (ref 0–1.8)
BASOPHILS # BLD: 0 K/UL (ref 0–0.12)
BILIRUB SERPL-MCNC: <0.2 MG/DL (ref 0.1–1.5)
BUN SERPL-MCNC: 14 MG/DL (ref 8–22)
CALCIUM SERPL-MCNC: 8.9 MG/DL (ref 8.5–10.5)
CHLORIDE SERPL-SCNC: 104 MMOL/L (ref 96–112)
CO2 SERPL-SCNC: 20 MMOL/L (ref 20–33)
CREAT SERPL-MCNC: 0.65 MG/DL (ref 0.5–1.4)
CRP SERPL HS-MCNC: 0.32 MG/DL (ref 0–0.75)
EKG IMPRESSION: NORMAL
EOSINOPHIL # BLD AUTO: 0 K/UL (ref 0–0.51)
EOSINOPHIL NFR BLD: 0 % (ref 0–6.9)
ERYTHROCYTE [DISTWIDTH] IN BLOOD BY AUTOMATED COUNT: 55.6 FL (ref 35.9–50)
ERYTHROCYTE [SEDIMENTATION RATE] IN BLOOD BY WESTERGREN METHOD: 15 MM/HOUR (ref 0–20)
GLOBULIN SER CALC-MCNC: 2.9 G/DL (ref 1.9–3.5)
GLUCOSE SERPL-MCNC: 123 MG/DL (ref 65–99)
HCT VFR BLD AUTO: 32.3 % (ref 37–47)
HGB BLD-MCNC: 9.8 G/DL (ref 12–16)
LYMPHOCYTES # BLD AUTO: 2.67 K/UL (ref 1–4.8)
LYMPHOCYTES NFR BLD: 21.2 % (ref 22–41)
MACROCYTES BLD QL SMEAR: ABNORMAL
MANUAL DIFF BLD: NORMAL
MCH RBC QN AUTO: 25.1 PG (ref 27–33)
MCHC RBC AUTO-ENTMCNC: 30.3 G/DL (ref 33.6–35)
MCV RBC AUTO: 82.8 FL (ref 81.4–97.8)
METAMYELOCYTES NFR BLD MANUAL: 0.9 %
MONOCYTES # BLD AUTO: 0.78 K/UL (ref 0–0.85)
MONOCYTES NFR BLD AUTO: 6.2 % (ref 0–13.4)
MORPHOLOGY BLD-IMP: NORMAL
MYELOCYTES NFR BLD MANUAL: 0.9 %
NEUTROPHILS # BLD AUTO: 8.92 K/UL (ref 2–7.15)
NEUTROPHILS NFR BLD: 69.9 % (ref 44–72)
NEUTS BAND NFR BLD MANUAL: 0.9 % (ref 0–10)
NRBC # BLD AUTO: 0 K/UL
NRBC BLD-RTO: 0 /100 WBC
PLATELET # BLD AUTO: 497 K/UL (ref 164–446)
PLATELET BLD QL SMEAR: NORMAL
PMV BLD AUTO: 8.5 FL (ref 9–12.9)
POLYCHROMASIA BLD QL SMEAR: NORMAL
POTASSIUM SERPL-SCNC: 4.1 MMOL/L (ref 3.6–5.5)
PROT SERPL-MCNC: 7 G/DL (ref 6–8.2)
RBC # BLD AUTO: 3.9 M/UL (ref 4.2–5.4)
RBC BLD AUTO: PRESENT
SODIUM SERPL-SCNC: 137 MMOL/L (ref 135–145)
WBC # BLD AUTO: 12.6 K/UL (ref 4.8–10.8)

## 2020-09-23 PROCEDURE — 96374 THER/PROPH/DIAG INJ IV PUSH: CPT

## 2020-09-23 PROCEDURE — 80053 COMPREHEN METABOLIC PANEL: CPT

## 2020-09-23 PROCEDURE — 86140 C-REACTIVE PROTEIN: CPT

## 2020-09-23 PROCEDURE — 36415 COLL VENOUS BLD VENIPUNCTURE: CPT

## 2020-09-23 PROCEDURE — 85652 RBC SED RATE AUTOMATED: CPT

## 2020-09-23 PROCEDURE — 93005 ELECTROCARDIOGRAM TRACING: CPT

## 2020-09-23 PROCEDURE — 85007 BL SMEAR W/DIFF WBC COUNT: CPT

## 2020-09-23 PROCEDURE — 700111 HCHG RX REV CODE 636 W/ 250 OVERRIDE (IP): Performed by: EMERGENCY MEDICINE

## 2020-09-23 PROCEDURE — 93005 ELECTROCARDIOGRAM TRACING: CPT | Performed by: EMERGENCY MEDICINE

## 2020-09-23 PROCEDURE — 99284 EMERGENCY DEPT VISIT MOD MDM: CPT

## 2020-09-23 PROCEDURE — 85027 COMPLETE CBC AUTOMATED: CPT

## 2020-09-23 RX ORDER — PREDNISONE 10 MG/1
TABLET ORAL
Qty: 32 TAB | Refills: 0 | Status: SHIPPED | OUTPATIENT
Start: 2020-09-23 | End: 2021-05-24

## 2020-09-23 RX ORDER — METHYLPREDNISOLONE SODIUM SUCCINATE 40 MG/ML
40 INJECTION, POWDER, LYOPHILIZED, FOR SOLUTION INTRAMUSCULAR; INTRAVENOUS ONCE
Status: COMPLETED | OUTPATIENT
Start: 2020-09-23 | End: 2020-09-23

## 2020-09-23 RX ADMIN — METHYLPREDNISOLONE SODIUM SUCCINATE 40 MG: 40 INJECTION, POWDER, FOR SOLUTION INTRAMUSCULAR; INTRAVENOUS at 02:30

## 2020-09-23 ASSESSMENT — FIBROSIS 4 INDEX: FIB4 SCORE: 0.23

## 2020-09-23 ASSESSMENT — LIFESTYLE VARIABLES: DO YOU DRINK ALCOHOL: NO

## 2020-09-23 NOTE — ED PROVIDER NOTES
ED Provider Note        Primary care provider: Pcp Pt States None    I verified that the patient was wearing a mask and I was wearing appropriate PPE every time I entered the room. The patient's mask was on the patient at all times during my encounter except for a brief view of the oropharynx.      CHIEF COMPLAINT  Chief Complaint   Patient presents with   • Numbness     Pt states she had bilat upper leg numbness that started 3 days ago and is getting progressively worse.       ANJALI Barbosa is a 40 y.o. female who presents to the Emergency Department with chief complaint of numbness.  Patient states that she began to have some numbness in her anterior proximal lower extremities about 3 days ago and is seem to get worse over the last couple of days.  She also reports that she feels weak in her hips and she feels as though her legs feel like Jell-O when she is trying to ambulate.  She is had no headache no altered mental status no cough congestion chest pain or shortness of breath she denies any abdominal pain any constipation any diarrhea.  Patient was recently admitted to the hospital and underwent autoimmune work-up she had a borderline elevated rheumatoid factor she had an elevated CRP during that work-up.  Patient was started on steroids she is she was discharged with prescription to take 20 mg of prednisone 3 times daily for the first week and to decrease it to 20 mg p.o. daily for the second week.  She stated that when she initiated the first couple of days of the steroids that she began feeling very jittery and felt occasional tightness in her chest therefore she decreased herself to 1 tablet daily and that is when the symptoms began to return.  She denies any back pain she has had some slight feeling as though 70 is poking her feet with pins but she denies any numbness.  No abdominal pain no fecal incontinence no urinary retention.  No fevers no shortness of breath at this time no known contact  "with any known COVID-19 patients.    REVIEW OF SYSTEMS  10 systems reviewed and otherwise negative, pertinent positives and negatives listed in the history of present illness.    PAST MEDICAL HISTORY   has a past medical history of Anemia and Obesity (BMI 30-39.9) (9/10/2020).    SURGICAL HISTORY   has a past surgical history that includes primary c section; incision and drainage general (Right, 9/10/2020); and breast biopsy (Right, 9/10/2020).    SOCIAL HISTORY  Social History     Tobacco Use   • Smoking status: Never Smoker   • Smokeless tobacco: Never Used   Substance Use Topics   • Alcohol use: Never     Frequency: Never   • Drug use: Never      Social History     Substance and Sexual Activity   Drug Use Never       FAMILY HISTORY  Non-Contributory    CURRENT MEDICATIONS  Home Medications    **Home medications have not yet been reviewed for this encounter**         ALLERGIES  No Known Allergies    PHYSICAL EXAM  VITAL SIGNS: /59   Pulse 68   Temp 36.1 °C (97 °F) (Oral)   Resp 16   Ht 1.753 m (5' 9\")   Wt 108.9 kg (240 lb)   LMP 08/26/2020 (Approximate)   SpO2 99%   BMI 35.44 kg/m²   Pulse ox interpretation: I interpret this pulse ox as normal.  Constitutional: Alert and oriented x 3, normal distress  HEENT: Atraumatic normocephalic, pupils are equal round reactive to light extraocular movements are intact. The nares is clear, external ears are normal, mouth shows moist mucous membranes  Neck: Supple, no JVD no tracheal deviation  Cardiovascular: Regular rate and rhythm no murmur rub or gallop 2+ pulses peripherally x4  Thorax & Lungs: No respiratory distress, no wheezes rales or rhonchi, No chest tenderness.   GI: Soft nontender nondistended positive bowel sounds, no peritoneal signs  Skin: Warm dry no acute rash or lesion  Musculoskeletal: Moving all extremities with full range and 5 of 5 strength, no acute  deformity  Neurologic: Cranial nerves III through XII are grossly intact, no sensory " deficit, no cerebellar dysfunction 2+ DTR bilateral patellar and Achilles tendons  Psychiatric: Appropriate affect for situation at this time      DIAGNOSTIC STUDIES / PROCEDURES  LABS      Results for orders placed or performed during the hospital encounter of 09/23/20   CRP QUANTITIVE (NON-CARDIAC)   Result Value Ref Range    Stat C-Reactive Protein 0.32 0.00 - 0.75 mg/dL   Sed Rate   Result Value Ref Range    Sed Rate Westergren 15 0 - 20 mm/hour   CBC WITH DIFFERENTIAL   Result Value Ref Range    WBC 12.6 (H) 4.8 - 10.8 K/uL    RBC 3.90 (L) 4.20 - 5.40 M/uL    Hemoglobin 9.8 (L) 12.0 - 16.0 g/dL    Hematocrit 32.3 (L) 37.0 - 47.0 %    MCV 82.8 81.4 - 97.8 fL    MCH 25.1 (L) 27.0 - 33.0 pg    MCHC 30.3 (L) 33.6 - 35.0 g/dL    RDW 55.6 (H) 35.9 - 50.0 fL    Platelet Count 497 (H) 164 - 446 K/uL    MPV 8.5 (L) 9.0 - 12.9 fL    Neutrophils-Polys 69.90 44.00 - 72.00 %    Lymphocytes 21.20 (L) 22.00 - 41.00 %    Monocytes 6.20 0.00 - 13.40 %    Eosinophils 0.00 0.00 - 6.90 %    Basophils 0.00 0.00 - 1.80 %    Nucleated RBC 0.00 /100 WBC    Neutrophils (Absolute) 8.92 (H) 2.00 - 7.15 K/uL    Lymphs (Absolute) 2.67 1.00 - 4.80 K/uL    Monos (Absolute) 0.78 0.00 - 0.85 K/uL    Eos (Absolute) 0.00 0.00 - 0.51 K/uL    Baso (Absolute) 0.00 0.00 - 0.12 K/uL    NRBC (Absolute) 0.00 K/uL    Anisocytosis 1+     Macrocytosis 1+    Comp Metabolic Panel   Result Value Ref Range    Sodium 137 135 - 145 mmol/L    Potassium 4.1 3.6 - 5.5 mmol/L    Chloride 104 96 - 112 mmol/L    Co2 20 20 - 33 mmol/L    Anion Gap 13.0 7.0 - 16.0    Glucose 123 (H) 65 - 99 mg/dL    Bun 14 8 - 22 mg/dL    Creatinine 0.65 0.50 - 1.40 mg/dL    Calcium 8.9 8.5 - 10.5 mg/dL    AST(SGOT) 10 (L) 12 - 45 U/L    ALT(SGPT) 64 (H) 2 - 50 U/L    Alkaline Phosphatase 64 30 - 99 U/L    Total Bilirubin <0.2 0.1 - 1.5 mg/dL    Albumin 4.1 3.2 - 4.9 g/dL    Total Protein 7.0 6.0 - 8.2 g/dL    Globulin 2.9 1.9 - 3.5 g/dL    A-G Ratio 1.4 g/dL   ESTIMATED GFR   Result  Value Ref Range    GFR If African American >60 >60 mL/min/1.73 m 2    GFR If Non African American >60 >60 mL/min/1.73 m 2   DIFFERENTIAL MANUAL   Result Value Ref Range    Bands-Stabs 0.90 0.00 - 10.00 %    Metamyelocytes 0.90 %    Myelocytes 0.90 %    Manual Diff Status PERFORMED    PERIPHERAL SMEAR REVIEW   Result Value Ref Range    Peripheral Smear Review see below    PLATELET ESTIMATE   Result Value Ref Range    Plt Estimation Increased    MORPHOLOGY   Result Value Ref Range    RBC Morphology Present     Polychromia 1+    EKG   Result Value Ref Range    Report       [facility] Emergency Dept.    Test Date:  2020  Pt Name:    ARDEN ROWE               Department: ER  MRN:        [id number]                      Room:        08  Gender:     Female                       Technician: [id number]  :        1980                   Requested By:ER TRIAGE PROTOCOL  Order #:    [id number]                    Reading MD: DARON GARCIA MD    Measurements  Intervals                                Axis  Rate:       61                           P:          15  ID:         [id number]                          QRS:        28  QRSD:       70                           T:          45  QT:         396  QTc:        399    Interpretive Statements  SINUS RHYTHM  Compared to ECG 2020 18:19:49  ST (T wave) deviation no longer present  Electronically Signed On 2020 4:22:34 PDT by DARON GARCIA MD         All labs reviewed by me.      COURSE & MEDICAL DECISION MAKING  Pertinent Labs & Imaging studies reviewed. (See chart for details)    1:52 AM - Patient seen and examined at bedside.         Patient noted to have slightly elevated blood pressure likely circumstantial secondary to presenting complaint. Referred to primary care physician for further evaluation.      Medical Decision Making: Patient's vital signs are unremarkable her physical exam is reassuring her CRP has completely normalized her ESR  "is normal she has no white blood cell count elevation.  I do think that there is a very real possibility the patient has a rheumatologic disorder my highest suspicion would be polymyalgia rheumatica however patient was given 1 dose of Solu-Medrol in the emergency department she had near resolution of all her symptoms she is ambulating to and from the bathroom with no difficulties her pain is improved she states the weak feeling in her hips is also improved.  Patient has a primary care physician through [facility] services and states that they are working on follow-up with rheumatologist.  At this point I think that the prednisone taper that was previously prescribed was a very high dose which may be appropriate for her condition however she did not tolerate this.  I will prescribe a somewhat less aggressive a longer course prednisone taper in hopes that this will aid in her symptoms while she is able to obtain rheumatologic follow-up.  I discussed this at length with patient she understands return immediately should she have worsening weakness any back pain any fevers chills numbness tingling    /59   Pulse 68   Temp 36.1 °C (97 °F) (Oral)   Resp 16   Ht 1.753 m (5' 9\")   Wt 108.9 kg (240 lb)   LMP 08/26/2020 (Approximate)   SpO2 99%   BMI 35.44 kg/m²     Primary care  Next available        [facility], Emergency Dept  [address] [address] [phone]    if symptoms persist, If symptoms worsen, or if you develop any other symptoms or concerns      Discharge Medication List as of 9/23/2020  4:33 AM          FINAL IMPRESSION  1. Paresthesias Active   2. Numbness Active         This dictation has been created using voice recognition software and/or scribes. The accuracy of the dictation is limited by the abilities of the software and the expertise of the scribes. I expect there may be some errors of grammar and possibly content. I made every attempt to manually correct " the errors within my dictation. However, errors related to voice recognition software and/or scribes may still exist and should be interpreted within the appropriate context.

## 2020-09-23 NOTE — ED NOTES
Pt discharged. Pt provided information about weakness and paresthesias. Pt educated to follow-up w/ PCP and to return to the hospital with any worsening symptoms. Pt provided prescriptions and walked to the lobby and was picked by her .

## 2020-09-24 ENCOUNTER — PATIENT OUTREACH (OUTPATIENT)
Dept: HEALTH INFORMATION MANAGEMENT | Facility: OTHER | Age: 40
End: 2020-09-24

## 2020-09-24 SDOH — ECONOMIC STABILITY: INCOME INSECURITY: HOW HARD IS IT FOR YOU TO PAY FOR THE VERY BASICS LIKE FOOD, HOUSING, MEDICAL CARE, AND HEATING?: SOMEWHAT HARD

## 2020-09-24 SDOH — ECONOMIC STABILITY: TRANSPORTATION INSECURITY
IN THE PAST 12 MONTHS, HAS THE LACK OF TRANSPORTATION KEPT YOU FROM MEDICAL APPOINTMENTS OR FROM GETTING MEDICATIONS?: NO

## 2020-09-24 SDOH — ECONOMIC STABILITY: FOOD INSECURITY: WITHIN THE PAST 12 MONTHS, THE FOOD YOU BOUGHT JUST DIDN'T LAST AND YOU DIDN'T HAVE MONEY TO GET MORE.: SOMETIMES TRUE

## 2020-09-24 SDOH — ECONOMIC STABILITY: TRANSPORTATION INSECURITY
IN THE PAST 12 MONTHS, HAS LACK OF TRANSPORTATION KEPT YOU FROM MEETINGS, WORK, OR FROM GETTING THINGS NEEDED FOR DAILY LIVING?: NO

## 2020-09-24 SDOH — ECONOMIC STABILITY: FOOD INSECURITY: WITHIN THE PAST 12 MONTHS, YOU WORRIED THAT YOUR FOOD WOULD RUN OUT BEFORE YOU GOT MONEY TO BUY MORE.: SOMETIMES TRUE

## 2020-09-25 ENCOUNTER — HOSPITAL ENCOUNTER (EMERGENCY)
Facility: MEDICAL CENTER | Age: 40
End: 2020-09-25
Attending: EMERGENCY MEDICINE | Admitting: EMERGENCY MEDICINE
Payer: MEDICAID

## 2020-09-25 VITALS
HEART RATE: 60 BPM | HEIGHT: 69 IN | RESPIRATION RATE: 14 BRPM | DIASTOLIC BLOOD PRESSURE: 56 MMHG | BODY MASS INDEX: 34.22 KG/M2 | OXYGEN SATURATION: 100 % | WEIGHT: 231.04 LBS | TEMPERATURE: 98.2 F | SYSTOLIC BLOOD PRESSURE: 109 MMHG

## 2020-09-25 DIAGNOSIS — M25.50 POLYARTHRALGIA: ICD-10-CM

## 2020-09-25 DIAGNOSIS — R20.2 PARESTHESIA: ICD-10-CM

## 2020-09-25 LAB
ALBUMIN SERPL BCP-MCNC: 4 G/DL (ref 3.2–4.9)
ALBUMIN/GLOB SERPL: 1.4 G/DL
ALP SERPL-CCNC: 61 U/L (ref 30–99)
ALT SERPL-CCNC: 45 U/L (ref 2–50)
ANION GAP SERPL CALC-SCNC: 15 MMOL/L (ref 7–16)
AST SERPL-CCNC: 14 U/L (ref 12–45)
BASOPHILS # BLD AUTO: 0.3 % (ref 0–1.8)
BASOPHILS # BLD: 0.03 K/UL (ref 0–0.12)
BILIRUB SERPL-MCNC: 0.2 MG/DL (ref 0.1–1.5)
BUN SERPL-MCNC: 17 MG/DL (ref 8–22)
CALCIUM SERPL-MCNC: 8.8 MG/DL (ref 8.5–10.5)
CHLORIDE SERPL-SCNC: 102 MMOL/L (ref 96–112)
CO2 SERPL-SCNC: 21 MMOL/L (ref 20–33)
CREAT SERPL-MCNC: 0.88 MG/DL (ref 0.5–1.4)
CRP SERPL HS-MCNC: 0.12 MG/DL (ref 0–0.75)
EOSINOPHIL # BLD AUTO: 0.06 K/UL (ref 0–0.51)
EOSINOPHIL NFR BLD: 0.5 % (ref 0–6.9)
ERYTHROCYTE [DISTWIDTH] IN BLOOD BY AUTOMATED COUNT: 58.4 FL (ref 35.9–50)
ERYTHROCYTE [SEDIMENTATION RATE] IN BLOOD BY WESTERGREN METHOD: 11 MM/HOUR (ref 0–20)
GLOBULIN SER CALC-MCNC: 2.8 G/DL (ref 1.9–3.5)
GLUCOSE SERPL-MCNC: 77 MG/DL (ref 65–99)
HCT VFR BLD AUTO: 35.8 % (ref 37–47)
HGB BLD-MCNC: 10.8 G/DL (ref 12–16)
IMM GRANULOCYTES # BLD AUTO: 0.14 K/UL (ref 0–0.11)
IMM GRANULOCYTES NFR BLD AUTO: 1.2 % (ref 0–0.9)
LYMPHOCYTES # BLD AUTO: 3.36 K/UL (ref 1–4.8)
LYMPHOCYTES NFR BLD: 29 % (ref 22–41)
MCH RBC QN AUTO: 25.2 PG (ref 27–33)
MCHC RBC AUTO-ENTMCNC: 30.2 G/DL (ref 33.6–35)
MCV RBC AUTO: 83.4 FL (ref 81.4–97.8)
MONOCYTES # BLD AUTO: 0.58 K/UL (ref 0–0.85)
MONOCYTES NFR BLD AUTO: 5 % (ref 0–13.4)
NEUTROPHILS # BLD AUTO: 7.42 K/UL (ref 2–7.15)
NEUTROPHILS NFR BLD: 64 % (ref 44–72)
NRBC # BLD AUTO: 0 K/UL
NRBC BLD-RTO: 0 /100 WBC
PLATELET # BLD AUTO: 493 K/UL (ref 164–446)
PMV BLD AUTO: 8.6 FL (ref 9–12.9)
POTASSIUM SERPL-SCNC: 4 MMOL/L (ref 3.6–5.5)
PROT SERPL-MCNC: 6.8 G/DL (ref 6–8.2)
RBC # BLD AUTO: 4.29 M/UL (ref 4.2–5.4)
SODIUM SERPL-SCNC: 138 MMOL/L (ref 135–145)
WBC # BLD AUTO: 11.6 K/UL (ref 4.8–10.8)

## 2020-09-25 PROCEDURE — 86140 C-REACTIVE PROTEIN: CPT

## 2020-09-25 PROCEDURE — 96374 THER/PROPH/DIAG INJ IV PUSH: CPT

## 2020-09-25 PROCEDURE — 99284 EMERGENCY DEPT VISIT MOD MDM: CPT

## 2020-09-25 PROCEDURE — 80053 COMPREHEN METABOLIC PANEL: CPT

## 2020-09-25 PROCEDURE — 700111 HCHG RX REV CODE 636 W/ 250 OVERRIDE (IP): Performed by: EMERGENCY MEDICINE

## 2020-09-25 PROCEDURE — 36415 COLL VENOUS BLD VENIPUNCTURE: CPT

## 2020-09-25 PROCEDURE — 85025 COMPLETE CBC W/AUTO DIFF WBC: CPT

## 2020-09-25 PROCEDURE — 85652 RBC SED RATE AUTOMATED: CPT

## 2020-09-25 RX ORDER — KETOROLAC TROMETHAMINE 30 MG/ML
15 INJECTION, SOLUTION INTRAMUSCULAR; INTRAVENOUS ONCE
Status: COMPLETED | OUTPATIENT
Start: 2020-09-25 | End: 2020-09-25

## 2020-09-25 RX ADMIN — KETOROLAC TROMETHAMINE 15 MG: 30 INJECTION, SOLUTION INTRAMUSCULAR; INTRAVENOUS at 14:26

## 2020-09-25 ASSESSMENT — LIFESTYLE VARIABLES
HAVE PEOPLE ANNOYED YOU BY CRITICIZING YOUR DRINKING: NO
EVER FELT BAD OR GUILTY ABOUT YOUR DRINKING: NO
HOW MANY TIMES IN THE PAST YEAR HAVE YOU HAD 5 OR MORE DRINKS IN A DAY: 0
TOTAL SCORE: 0
HAVE YOU EVER FELT YOU SHOULD CUT DOWN ON YOUR DRINKING: NO
AVERAGE NUMBER OF DAYS PER WEEK YOU HAVE A DRINK CONTAINING ALCOHOL: 0
ON A TYPICAL DAY WHEN YOU DRINK ALCOHOL HOW MANY DRINKS DO YOU HAVE: 0
TOTAL SCORE: 0
TOTAL SCORE: 0
DO YOU DRINK ALCOHOL: NO
EVER HAD A DRINK FIRST THING IN THE MORNING TO STEADY YOUR NERVES TO GET RID OF A HANGOVER: NO
CONSUMPTION TOTAL: NEGATIVE

## 2020-09-25 ASSESSMENT — FIBROSIS 4 INDEX: FIB4 SCORE: 0.1

## 2020-09-25 NOTE — ED TRIAGE NOTES
Pt ambulated to triage without difficulty   Chief Complaint   Patient presents with   • Pain     neuropathy pain, numbness all over, here 2 days ago d/t numbness to legs, pt seen here 3 times this month for the same.  pt has been taking steriods for a wk and a half - pt thinks that the steroid is too much and not taking it completley as prescribed.       Pt hasn't taken any of her medications today.  Pt Informed regarding triage process and verbalized understanding to inform triage tech or RN for any changes in condition. Placed in lobby.

## 2020-09-25 NOTE — ED NOTES
Pt resting comfortably in bed, GCS 15, VSS on RA, no signs of distress, will continue to monitor.

## 2020-09-25 NOTE — ED PROVIDER NOTES
ED Provider Note    ER PROVIDER NOTE        CHIEF COMPLAINT  Chief Complaint   Patient presents with   • Pain     neuropathy pain, numbness all over, here 2 days ago d/t numbness to legs, pt seen here 3 times this month for the same.  pt has been taking steriods for a wk and a half - pt thinks that the steroid is too much and not taking it completley as prescribed.         HPI  Suellen Barbosa is a 40 y.o. female who presents to the emergency department complaining of numbness and pins-and-needles pain all over her body.  She reports this has been ongoing for quite some time especially over the last month, it seems to come and go and be worse at certain points.  Has been worse over the last 2 days.  She notes most of this is through her legs and around her face.  She has no chest pain, back pain or abdominal pain.  No nausea or vomiting.  No focal weakness.  No issues with bowel or bladder incontinence or retention.  No recent fevers chills cough or congestion.  No headaches or neck pain.    She has had several emergency department visits as well as admissions, most recently in the middle of September for similar and was discharged on prednisone although states she has not been taking over the last few days    REVIEW OF SYSTEMS  Pertinent positives include paresthesias. Pertinent negatives include no fever. See HPI for details. All other systems reviewed and are negative.    PAST MEDICAL HISTORY   has a past medical history of Anemia and Obesity (BMI 30-39.9) (9/10/2020).    SURGICAL HISTORY   has a past surgical history that includes primary c section; incision and drainage general (Right, 9/10/2020); and breast biopsy (Right, 9/10/2020).    FAMILY HISTORY  No family history on file.    SOCIAL HISTORY  Social History     Socioeconomic History   • Marital status: Single     Spouse name: Not on file   • Number of children: Not on file   • Years of education: Not on file   • Highest education level: Not on file    Occupational History   • Not on file   Social Needs   • Financial resource strain: Somewhat hard   • Food insecurity     Worry: Sometimes true     Inability: Sometimes true   • Transportation needs     Medical: No     Non-medical: No   Tobacco Use   • Smoking status: Never Smoker   • Smokeless tobacco: Never Used   Substance and Sexual Activity   • Alcohol use: Never     Frequency: Never   • Drug use: Never   • Sexual activity: Not on file   Lifestyle   • Physical activity     Days per week: Not on file     Minutes per session: Not on file   • Stress: Not on file   Relationships   • Social connections     Talks on phone: Not on file     Gets together: Not on file     Attends Gnosticism service: Not on file     Active member of club or organization: Not on file     Attends meetings of clubs or organizations: Not on file     Relationship status: Not on file   • Intimate partner violence     Fear of current or ex partner: Not on file     Emotionally abused: Not on file     Physically abused: Not on file     Forced sexual activity: Not on file   Other Topics Concern   • Not on file   Social History Narrative   • Not on file      Social History     Substance and Sexual Activity   Drug Use Never       CURRENT MEDICATIONS  Home Medications     Reviewed by Dee Soto R.N. (Registered Nurse) on 09/25/20 at 1230  Med List Status: Partial   Medication Last Dose Status   acetaminophen (TYLENOL) 325 MG Tab prn Active   ferrous sulfate 325 (65 Fe) MG tablet 9/24/2020 Active   folic acid (FOLVITE) 1 MG Tab 9/24/2020 Active   ibuprofen (IBU) 400 MG Tab 9/24/2020 Active   IRON CHEWS PEDIATRIC 15 MG Chew Tab 9/24/2020 Active   norethindrone (AYGESTIN) 5 MG tablet 9/24/2020 Active   omeprazole (PRILOSEC) 40 MG delayed-release capsule not taking Active   predniSONE (DELTASONE) 10 MG Tab 9/24/2020 Active                ALLERGIES  No Known Allergies    PHYSICAL EXAM  VITAL SIGNS: /56   Pulse 60   Temp 36.8 °C (98.2 °F)  "(Temporal)   Resp 14   Ht 1.753 m (5' 9\")   Wt 104.8 kg (231 lb 0.7 oz)   LMP 08/26/2020 (Approximate)   SpO2 100%   BMI 34.12 kg/m²   Pulse ox interpretation: I interpret this pulse ox as normal.    Constitutional: Alert in no apparent distress.  HENT: No signs of trauma, Bilateral external ears normal, Nose normal.   Eyes: Pupils are equal and reactive, Conjunctiva normal, Non-icteric.   Neck: Normal range of motion, No tenderness, Supple, No stridor.   Lymphatic: No lymphadenopathy noted.   Cardiovascular: Regular rate and rhythm, no murmurs.   Thorax & Lungs: Normal breath sounds, No respiratory distress, No wheezing, No chest tenderness.   Abdomen: Bowel sounds normal, Soft, No tenderness, No masses, No pulsatile masses. No peritoneal signs.  Skin: Warm, Dry, No erythema, No rash.   Back: No bony tenderness, No CVA tenderness.   Extremities: Intact distal pulses, No edema, No tenderness, No cyanosis, Negative Carroll's sign.  Musculoskeletal: Good range of motion in all major joints. No tenderness to palpation or major deformities noted.   Neurologic: Alert, cranial nerves intact, speech is appropriate or not slurred, upper extremities bilaterally exhibit no drift, no dysmetria, 5 out of 5 strength with bilateral bicep/tricep/, sensation intact to light touch throughout upper extremities. Lower extremities strength 5 out of 5 thigh extension/flexion/abduction/adduction, knee extension/flexion, dorsiflexion plantar flexion. No clonus.  2+ patella reflexes.  sensation intact to light touch.  No focal deficits noted. Ambulates with steady gait, steady tandem gait  Psychiatric: Affect normal, Judgment normal, Mood normal.       DIAGNOSTIC STUDIES / PROCEDURES        LABS  Labs Reviewed   CBC WITH DIFFERENTIAL - Abnormal; Notable for the following components:       Result Value    WBC 11.6 (*)     Hemoglobin 10.8 (*)     Hematocrit 35.8 (*)     MCH 25.2 (*)     MCHC 30.2 (*)     RDW 58.4 (*)     Platelet " Count 493 (*)     MPV 8.6 (*)     Immature Granulocytes 1.20 (*)     Neutrophils (Absolute) 7.42 (*)     Immature Granulocytes (abs) 0.14 (*)     All other components within normal limits   COMP METABOLIC PANEL   CRP QUANTITIVE (NON-CARDIAC)   SED RATE   ESTIMATED GFR       All labs reviewed by me.    RADIOLOGY  No orders to display     The radiologist's interpretation of all radiological studies have been reviewed and images independently viewed by me.    COURSE & MEDICAL DECISION MAKING  Nursing notes, VS, PMSFHx reviewed in chart.    1:50 PM Patient seen and examined at bedside. Patient will be treated with Toradol. Ordered for CBC, CMP to evaluate her symptoms.     In review patient records she was seen on 923 at this facility, with extensive work-up, normal CRP, sed rates, mildly anemic, unremarkable metabolic panel    She was admitted on 915 for joint pain and was treated with steroids    Was seen on 9 9 for similar, as well as a breast abscess, noted to have 6 visits at that time to the emergency department for the same thing    4:09 PM  Patient evaluated she is feeling improved updated on results and will plan for discharge          Decision Making:  This is a 40 y.o. female presenting with some paresthesia type pain.  Patient has longstanding history of these pains, has been referred to rheumatology, at this point does not appear to have an emergent or acute medical illness.  She has no evidence of neurologic compromise.  Normal inflammatory markers, no findings suggestive of acute infectious process.  She has follow-up ready scheduled with her primary care     The patient will return for new or worsening symptoms and is stable at the time of discharge.    The patient is referred to a primary physician for blood pressure management, diabetic screening, and for all other preventative health concerns.      DISPOSITION:  Patient will be discharged home in stable condition.    FOLLOW UP:  With your primary care  doctor            OUTPATIENT MEDICATIONS:  Discharge Medication List as of 9/25/2020  4:43 PM            FINAL IMPRESSION  1. Paresthesia    2. Polyarthralgia         The note accurately reflects work and decisions made by me.  Yadiel Lanier M.D.  9/25/2020  6:58 PM

## 2020-10-20 ENCOUNTER — HOSPITAL ENCOUNTER (EMERGENCY)
Facility: MEDICAL CENTER | Age: 40
End: 2020-10-20
Attending: EMERGENCY MEDICINE
Payer: MEDICAID

## 2020-10-20 VITALS
DIASTOLIC BLOOD PRESSURE: 66 MMHG | HEART RATE: 77 BPM | OXYGEN SATURATION: 94 % | WEIGHT: 235.89 LBS | BODY MASS INDEX: 34.94 KG/M2 | RESPIRATION RATE: 18 BRPM | HEIGHT: 69 IN | TEMPERATURE: 96.9 F | SYSTOLIC BLOOD PRESSURE: 106 MMHG

## 2020-10-20 DIAGNOSIS — N92.0 MENORRHAGIA WITH REGULAR CYCLE: ICD-10-CM

## 2020-10-20 LAB
ABO + RH BLD: NORMAL
ABO GROUP BLD: NORMAL
ALBUMIN SERPL BCP-MCNC: 4.1 G/DL (ref 3.2–4.9)
ALBUMIN/GLOB SERPL: 1.2 G/DL
ALP SERPL-CCNC: 72 U/L (ref 30–99)
ALT SERPL-CCNC: 23 U/L (ref 2–50)
ANION GAP SERPL CALC-SCNC: 10 MMOL/L (ref 7–16)
ANISOCYTOSIS BLD QL SMEAR: ABNORMAL
AST SERPL-CCNC: 9 U/L (ref 12–45)
BASOPHILS # BLD AUTO: 0 % (ref 0–1.8)
BASOPHILS # BLD: 0 K/UL (ref 0–0.12)
BILIRUB SERPL-MCNC: <0.2 MG/DL (ref 0.1–1.5)
BLD GP AB SCN SERPL QL: NORMAL
BUN SERPL-MCNC: 17 MG/DL (ref 8–22)
CALCIUM SERPL-MCNC: 9 MG/DL (ref 8.5–10.5)
CHLORIDE SERPL-SCNC: 105 MMOL/L (ref 96–112)
CO2 SERPL-SCNC: 24 MMOL/L (ref 20–33)
CREAT SERPL-MCNC: 0.83 MG/DL (ref 0.5–1.4)
EOSINOPHIL # BLD AUTO: 0.09 K/UL (ref 0–0.51)
EOSINOPHIL NFR BLD: 0.9 % (ref 0–6.9)
ERYTHROCYTE [DISTWIDTH] IN BLOOD BY AUTOMATED COUNT: 49.2 FL (ref 35.9–50)
GLOBULIN SER CALC-MCNC: 3.3 G/DL (ref 1.9–3.5)
GLUCOSE SERPL-MCNC: 82 MG/DL (ref 65–99)
HCG SERPL QL: NEGATIVE
HCT VFR BLD AUTO: 33.5 % (ref 37–47)
HGB BLD-MCNC: 9.8 G/DL (ref 12–16)
HYPOCHROMIA BLD QL SMEAR: ABNORMAL
LYMPHOCYTES # BLD AUTO: 1.15 K/UL (ref 1–4.8)
LYMPHOCYTES NFR BLD: 12.1 % (ref 22–41)
MANUAL DIFF BLD: NORMAL
MCH RBC QN AUTO: 24.2 PG (ref 27–33)
MCHC RBC AUTO-ENTMCNC: 29.3 G/DL (ref 33.6–35)
MCV RBC AUTO: 82.7 FL (ref 81.4–97.8)
METAMYELOCYTES NFR BLD MANUAL: 0.9 %
MICROCYTES BLD QL SMEAR: ABNORMAL
MONOCYTES # BLD AUTO: 0.25 K/UL (ref 0–0.85)
MONOCYTES NFR BLD AUTO: 2.6 % (ref 0–13.4)
MORPHOLOGY BLD-IMP: NORMAL
MYELOCYTES NFR BLD MANUAL: 1.7 %
NEUTROPHILS # BLD AUTO: 7.78 K/UL (ref 2–7.15)
NEUTROPHILS NFR BLD: 81.9 % (ref 44–72)
NRBC # BLD AUTO: 0 K/UL
NRBC BLD-RTO: 0 /100 WBC
OVALOCYTES BLD QL SMEAR: NORMAL
PLATELET # BLD AUTO: 500 K/UL (ref 164–446)
PLATELET BLD QL SMEAR: NORMAL
PMV BLD AUTO: 9 FL (ref 9–12.9)
POIKILOCYTOSIS BLD QL SMEAR: NORMAL
POLYCHROMASIA BLD QL SMEAR: NORMAL
POTASSIUM SERPL-SCNC: 3.9 MMOL/L (ref 3.6–5.5)
PROT SERPL-MCNC: 7.4 G/DL (ref 6–8.2)
RBC # BLD AUTO: 4.05 M/UL (ref 4.2–5.4)
RBC BLD AUTO: PRESENT
RH BLD: NORMAL
SODIUM SERPL-SCNC: 139 MMOL/L (ref 135–145)
WBC # BLD AUTO: 9.5 K/UL (ref 4.8–10.8)

## 2020-10-20 PROCEDURE — 86900 BLOOD TYPING SEROLOGIC ABO: CPT

## 2020-10-20 PROCEDURE — 86850 RBC ANTIBODY SCREEN: CPT

## 2020-10-20 PROCEDURE — 86901 BLOOD TYPING SEROLOGIC RH(D): CPT

## 2020-10-20 PROCEDURE — 85007 BL SMEAR W/DIFF WBC COUNT: CPT

## 2020-10-20 PROCEDURE — 84703 CHORIONIC GONADOTROPIN ASSAY: CPT

## 2020-10-20 PROCEDURE — 99284 EMERGENCY DEPT VISIT MOD MDM: CPT

## 2020-10-20 PROCEDURE — 80053 COMPREHEN METABOLIC PANEL: CPT

## 2020-10-20 PROCEDURE — 85027 COMPLETE CBC AUTOMATED: CPT

## 2020-10-20 RX ORDER — IBUPROFEN 200 MG
400 TABLET ORAL EVERY 6 HOURS PRN
Status: ON HOLD | COMMUNITY
End: 2020-12-11

## 2020-10-20 RX ORDER — PNV NO.95/FERROUS FUM/FOLIC AC 28MG-0.8MG
1 TABLET ORAL DAILY
Qty: 30 TAB | Refills: 0 | Status: SHIPPED | OUTPATIENT
Start: 2020-10-20 | End: 2022-05-06

## 2020-10-20 ASSESSMENT — FIBROSIS 4 INDEX: FIB4 SCORE: 0.17

## 2020-10-20 NOTE — ED TRIAGE NOTES
Pt ambulated to triage with   Chief Complaint   Patient presents with   • Sent by MD     lab completed d/t heavy vaginal bleeding, pt has been bleeding for the last 11 days.  scheduled for hysterectomy on the 27th.   • Abnormal Labs     low H/H  reports hemoglobin 7      Pt Informed regarding triage process and verbalized understanding to inform triage tech or RN for any changes in condition. Placed in lobby.

## 2020-10-20 NOTE — DISCHARGE INSTRUCTIONS
Return if you have heavy vaginal bleeding greater than a pad an hour, shortness of breath or pass out.

## 2020-10-20 NOTE — ED PROVIDER NOTES
ED Provider Note    Scribed for Isidro Verma M.D. by Roz Fallon. 10/20/2020, 1:27 PM.    Primary care provider: Pcp Pt States None  Means of arrival: Walk in   History obtained from: Patient  History limited by: None    CHIEF COMPLAINT  Chief Complaint   Patient presents with   • Sent by MD     lab completed d/t heavy vaginal bleeding, pt has been bleeding for the last 11 days.  scheduled for hysterectomy on the 27th.   • Abnormal Labs     low H/H  reports hemoglobin 7       HPI  Suellen Barbosa is a 40 y.o. female who presents to the Emergency Department for evaluation following abnormal labs. The patient states that she has been bleeding for 11 days on her period and is scheduled to have a hysterectomy in a week. She describes the blood as dark red in color. Her hemoglobin was 7 this morning so her doctor suggested she come to the ED. Patient reports having heavy bleeding since menarche, however her bleeding today is heavier than normal. She reports associated dizziness and headache. She denies fever, chills, nausea, vomiting, hematemesis, shortness of breath, chest pain, melena, hematochezia, abdominal pain, or dysuria. Patient reports having 7 children and a history of fibroids. She states that she currently takes norethindrone 5mg for her symptoms, however she does not believe that it has alleviated her symptoms.     REVIEW OF SYSTEMS  Pertinent positives include abnormal labs, dizziness, and headache. Pertinent negatives include fever, chills, nausea, vomiting, hematemesis, shortness of breath, chest pain, melena, hematochezia, abdominal pain, or dysuria. All other systems negative.    PAST MEDICAL HISTORY   has a past medical history of Anemia and Obesity (BMI 30-39.9) (9/10/2020).    SURGICAL HISTORY   has a past surgical history that includes primary c section; incision and drainage general (Right, 9/10/2020); and breast biopsy (Right, 9/10/2020).    SOCIAL HISTORY  Social History  "    Tobacco Use   • Smoking status: Never Smoker   • Smokeless tobacco: Never Used   Substance Use Topics   • Alcohol use: Never     Frequency: Never   • Drug use: Never      Social History     Substance and Sexual Activity   Drug Use Never       FAMILY HISTORY  No family history on file.    CURRENT MEDICATIONS  Home Medications     Reviewed by Butch Saunders (Pharmacy Tech) on 10/20/20 at 1409  Med List Status: Complete   Medication Last Dose Status   ibuprofen (MOTRIN) 200 MG Tab 10/19/2020 Active   norethindrone (AYGESTIN) 5 MG tablet 10/20/2020 Active   omeprazole (PRILOSEC) 40 MG delayed-release capsule 10/20/2020 Active   predniSONE (DELTASONE) 10 MG Tab COMPLETE Active                ALLERGIES  No Known Allergies    PHYSICAL EXAM  VITAL SIGNS: /60   Pulse 89   Temp 36.1 °C (96.9 °F) (Temporal)   Resp 18   Ht 1.753 m (5' 9\")   Wt 107 kg (235 lb 14.3 oz)   SpO2 97%   BMI 34.84 kg/m²     Constitutional: Well developed, Well nourished, mild distress.   HENT: Normocephalic, Atraumatic, mask in place  Eyes: Conjunctiva normal, No discharge.   Cardiovascular: Normal heart rate, Normal rhythm, No murmurs, equal pulses.   Pulmonary: Normal breath sounds, No respiratory distress, No wheezing, No rales, No rhonchi.  Abdomen:Soft, No tenderness, .   : Small to moderate amount of bleeding from cervical os dark blood.   Back: Mild CVA tenderness on left side   Musculoskeletal: No major deformities noted, No tenderness.   Skin: Warm, Dry, No erythema, No rash.   Neurologic: Alert & oriented x 3, Normal motor function,  No focal deficits noted.   Psychiatric: Affect normal, Judgment normal, Mood normal.      LABS  Results for orders placed or performed during the hospital encounter of 10/20/20   CBC WITH DIFFERENTIAL   Result Value Ref Range    WBC 9.5 4.8 - 10.8 K/uL    RBC 4.05 (L) 4.20 - 5.40 M/uL    Hemoglobin 9.8 (L) 12.0 - 16.0 g/dL    Hematocrit 33.5 (L) 37.0 - 47.0 %    MCV 82.7 81.4 - 97.8 fL "    MCH 24.2 (L) 27.0 - 33.0 pg    MCHC 29.3 (L) 33.6 - 35.0 g/dL    RDW 49.2 35.9 - 50.0 fL    Platelet Count 500 (H) 164 - 446 K/uL    MPV 9.0 9.0 - 12.9 fL    Neutrophils-Polys 81.90 (H) 44.00 - 72.00 %    Lymphocytes 12.10 (L) 22.00 - 41.00 %    Monocytes 2.60 0.00 - 13.40 %    Eosinophils 0.90 0.00 - 6.90 %    Basophils 0.00 0.00 - 1.80 %    Nucleated RBC 0.00 /100 WBC    Neutrophils (Absolute) 7.78 (H) 2.00 - 7.15 K/uL    Lymphs (Absolute) 1.15 1.00 - 4.80 K/uL    Monos (Absolute) 0.25 0.00 - 0.85 K/uL    Eos (Absolute) 0.09 0.00 - 0.51 K/uL    Baso (Absolute) 0.00 0.00 - 0.12 K/uL    NRBC (Absolute) 0.00 K/uL    Hypochromia 2+ (A)     Anisocytosis 1+     Microcytosis 1+    COMP METABOLIC PANEL   Result Value Ref Range    Sodium 139 135 - 145 mmol/L    Potassium 3.9 3.6 - 5.5 mmol/L    Chloride 105 96 - 112 mmol/L    Co2 24 20 - 33 mmol/L    Anion Gap 10.0 7.0 - 16.0    Glucose 82 65 - 99 mg/dL    Bun 17 8 - 22 mg/dL    Creatinine 0.83 0.50 - 1.40 mg/dL    Calcium 9.0 8.5 - 10.5 mg/dL    AST(SGOT) 9 (L) 12 - 45 U/L    ALT(SGPT) 23 2 - 50 U/L    Alkaline Phosphatase 72 30 - 99 U/L    Total Bilirubin <0.2 0.1 - 1.5 mg/dL    Albumin 4.1 3.2 - 4.9 g/dL    Total Protein 7.4 6.0 - 8.2 g/dL    Globulin 3.3 1.9 - 3.5 g/dL    A-G Ratio 1.2 g/dL   HCG QUAL SERUM   Result Value Ref Range    Beta-Hcg Qualitative Serum Negative Negative   COD (ADULT)   Result Value Ref Range    ABO Grouping Only O     Rh Grouping Only POS     Antibody Screen-Cod NEG    ESTIMATED GFR   Result Value Ref Range    GFR If African American >60 >60 mL/min/1.73 m 2    GFR If Non African American >60 >60 mL/min/1.73 m 2   DIFFERENTIAL MANUAL   Result Value Ref Range    Metamyelocytes 0.90 %    Myelocytes 1.70 %    Manual Diff Status PERFORMED    PERIPHERAL SMEAR REVIEW   Result Value Ref Range    Peripheral Smear Review see below    PLATELET ESTIMATE   Result Value Ref Range    Plt Estimation Increased    MORPHOLOGY   Result Value Ref Range    RBC  Morphology Present     Polychromia 1+     Poikilocytosis 1+     Ovalocytes 1+    ABO Rh Confirm   Result Value Ref Range    ABO Rh Confirm O POS       All labs reviewed by me.    COURSE & MEDICAL DECISION MAKING  Nursing notes, VS, PMSFHx reviewed in chart.    1:27 PM - Patient seen and examined at bedside. Ordered COD, CBC with differential, CMP, and HCG Qual Serum to evaluate her symptoms. The differential diagnoses include but are not limited to: anemia, menometrorrhagia, menorrhagia     2:52 PM - Informed patient that her hemoglobin has risen to 9.8.    3:37 PM - ERP at bedside for pelvic examination with nursing staff of same gender acting as chaperone.    4:10 PM - I discussed the patient's case and the above findings with Dr. Peter (OB/gyn).      4:19 PM - Patient informed that she will be discharged home and was given the opportunity to ask any questions. Discussed return precautions and encouraged her to return for any new or worsening symptoms. Patient verbalizes understanding and agreement to this plan of care.      Medical Decision Making: At this point time patient does not appear to be anemic below 7 to require blood transfusion.  Patient does have a history of a fibroid which is probably causing her to have heavier bleeding.  She is hemodynamically stable.  After discussion with on-call gynecology will double the patient's hormone replacement.  She is scheduled to have a hysterectomy later this month.  Discussed return guidelines with the patient.     The patient will return for new or worsening symptoms and is stable at the time of discharge.    The patient is referred to a primary physician for blood pressure management, diabetic screening, and for all other preventative health concerns.      DISPOSITION:  Patient will be discharged home in stable condition.    FOLLOW UP:  Dr. Ambrocio      Call your gyncologist tomorrow..      OUTPATIENT MEDICATIONS:  New Prescriptions    PRENATAL MULTIVIT-MIN-FE-FA  (PRENATAL/IRON) TAB    Take 1 Tab by mouth every day.         FINAL IMPRESSION  1. Menorrhagia with regular cycle          Roz NOWAK (Lucianoibsteven), am scribing for, and in the presence of, Isidro Verma M.D.    Electronically signed by: Roz Fallon (Lucianoibsteven), 10/20/2020    Isidro NOWAK M.D. personally performed the services described in this documentation, as scribed by Roz Fallon in my presence, and it is both accurate and complete. C    The note accurately reflects work and decisions made by me.  Isidro Verma M.D.  10/20/2020  6:49 PM

## 2020-10-20 NOTE — ED NOTES
Pt discharged home as ordered by ERP. Pt given RXs. Pt instructed to call her GYN MD tomorrow and return here as needed. Pt verbalized understanding and call her  for a ride home. Pt left ambulating independently

## 2020-12-10 ENCOUNTER — PRE-ADMISSION TESTING (OUTPATIENT)
Dept: ADMISSIONS | Facility: MEDICAL CENTER | Age: 40
End: 2020-12-10
Attending: SURGERY
Payer: MEDICAID

## 2020-12-10 DIAGNOSIS — Z01.812 PRE-OPERATIVE LABORATORY EXAMINATION: ICD-10-CM

## 2020-12-10 LAB
COVID ORDER STATUS COVID19: NORMAL
SARS-COV-2 RDRP RESP QL NAA+PROBE: NOTDETECTED
SPECIMEN SOURCE: NORMAL

## 2020-12-10 PROCEDURE — C9803 HOPD COVID-19 SPEC COLLECT: HCPCS

## 2020-12-10 PROCEDURE — U0004 COV-19 TEST NON-CDC HGH THRU: HCPCS

## 2020-12-11 ENCOUNTER — ANESTHESIA (OUTPATIENT)
Dept: SURGERY | Facility: MEDICAL CENTER | Age: 40
End: 2020-12-11
Payer: MEDICAID

## 2020-12-11 ENCOUNTER — HOSPITAL ENCOUNTER (OUTPATIENT)
Facility: MEDICAL CENTER | Age: 40
End: 2020-12-11
Attending: SURGERY | Admitting: SURGERY
Payer: MEDICAID

## 2020-12-11 ENCOUNTER — ANESTHESIA EVENT (OUTPATIENT)
Dept: SURGERY | Facility: MEDICAL CENTER | Age: 40
End: 2020-12-11
Payer: MEDICAID

## 2020-12-11 VITALS
TEMPERATURE: 97.6 F | BODY MASS INDEX: 34.97 KG/M2 | DIASTOLIC BLOOD PRESSURE: 75 MMHG | RESPIRATION RATE: 16 BRPM | WEIGHT: 236.11 LBS | HEIGHT: 69 IN | OXYGEN SATURATION: 95 % | SYSTOLIC BLOOD PRESSURE: 120 MMHG | HEART RATE: 75 BPM

## 2020-12-11 DIAGNOSIS — G89.18 ACUTE POST-OPERATIVE PAIN: ICD-10-CM

## 2020-12-11 PROBLEM — N61.0 MAMMARY FISTULA: Status: ACTIVE | Noted: 2020-12-11

## 2020-12-11 LAB
ANION GAP SERPL CALC-SCNC: 11 MMOL/L (ref 7–16)
BUN SERPL-MCNC: 11 MG/DL (ref 8–22)
CALCIUM SERPL-MCNC: 9.5 MG/DL (ref 8.5–10.5)
CHLORIDE SERPL-SCNC: 103 MMOL/L (ref 96–112)
CO2 SERPL-SCNC: 22 MMOL/L (ref 20–33)
CREAT SERPL-MCNC: 0.76 MG/DL (ref 0.5–1.4)
ERYTHROCYTE [DISTWIDTH] IN BLOOD BY AUTOMATED COUNT: 61 FL (ref 35.9–50)
GLUCOSE SERPL-MCNC: 96 MG/DL (ref 65–99)
HCT VFR BLD AUTO: 43.7 % (ref 37–47)
HGB BLD-MCNC: 13.2 G/DL (ref 12–16)
MCH RBC QN AUTO: 26.5 PG (ref 27–33)
MCHC RBC AUTO-ENTMCNC: 30.2 G/DL (ref 33.6–35)
MCV RBC AUTO: 87.6 FL (ref 81.4–97.8)
PLATELET # BLD AUTO: 228 K/UL (ref 164–446)
PMV BLD AUTO: 10.7 FL (ref 9–12.9)
POTASSIUM SERPL-SCNC: 4.1 MMOL/L (ref 3.6–5.5)
RBC # BLD AUTO: 4.99 M/UL (ref 4.2–5.4)
SODIUM SERPL-SCNC: 136 MMOL/L (ref 135–145)
WBC # BLD AUTO: 12.2 K/UL (ref 4.8–10.8)

## 2020-12-11 PROCEDURE — A9270 NON-COVERED ITEM OR SERVICE: HCPCS | Performed by: SURGERY

## 2020-12-11 PROCEDURE — 80048 BASIC METABOLIC PNL TOTAL CA: CPT

## 2020-12-11 PROCEDURE — 160035 HCHG PACU - 1ST 60 MINS PHASE I: Performed by: SURGERY

## 2020-12-11 PROCEDURE — 700101 HCHG RX REV CODE 250: Performed by: ANESTHESIOLOGY

## 2020-12-11 PROCEDURE — 85027 COMPLETE CBC AUTOMATED: CPT

## 2020-12-11 PROCEDURE — 700111 HCHG RX REV CODE 636 W/ 250 OVERRIDE (IP): Performed by: SURGERY

## 2020-12-11 PROCEDURE — 700111 HCHG RX REV CODE 636 W/ 250 OVERRIDE (IP): Performed by: ANESTHESIOLOGY

## 2020-12-11 PROCEDURE — 88307 TISSUE EXAM BY PATHOLOGIST: CPT | Mod: 59

## 2020-12-11 PROCEDURE — 88304 TISSUE EXAM BY PATHOLOGIST: CPT

## 2020-12-11 PROCEDURE — 160046 HCHG PACU - 1ST 60 MINS PHASE II: Performed by: SURGERY

## 2020-12-11 PROCEDURE — 700102 HCHG RX REV CODE 250 W/ 637 OVERRIDE(OP): Performed by: ANESTHESIOLOGY

## 2020-12-11 PROCEDURE — 700101 HCHG RX REV CODE 250: Performed by: SURGERY

## 2020-12-11 PROCEDURE — 700105 HCHG RX REV CODE 258: Performed by: SURGERY

## 2020-12-11 PROCEDURE — 160039 HCHG SURGERY MINUTES - EA ADDL 1 MIN LEVEL 3: Performed by: SURGERY

## 2020-12-11 PROCEDURE — 160036 HCHG PACU - EA ADDL 30 MINS PHASE I: Performed by: SURGERY

## 2020-12-11 PROCEDURE — 501838 HCHG SUTURE GENERAL: Performed by: SURGERY

## 2020-12-11 PROCEDURE — 160009 HCHG ANES TIME/MIN: Performed by: SURGERY

## 2020-12-11 PROCEDURE — 160025 RECOVERY II MINUTES (STATS): Performed by: SURGERY

## 2020-12-11 PROCEDURE — 160028 HCHG SURGERY MINUTES - 1ST 30 MINS LEVEL 3: Performed by: SURGERY

## 2020-12-11 PROCEDURE — 160002 HCHG RECOVERY MINUTES (STAT): Performed by: SURGERY

## 2020-12-11 PROCEDURE — 160048 HCHG OR STATISTICAL LEVEL 1-5: Performed by: SURGERY

## 2020-12-11 PROCEDURE — A9270 NON-COVERED ITEM OR SERVICE: HCPCS | Performed by: ANESTHESIOLOGY

## 2020-12-11 RX ORDER — MIDAZOLAM HYDROCHLORIDE 1 MG/ML
INJECTION INTRAMUSCULAR; INTRAVENOUS PRN
Status: DISCONTINUED | OUTPATIENT
Start: 2020-12-11 | End: 2020-12-11 | Stop reason: SURG

## 2020-12-11 RX ORDER — BUPIVACAINE HYDROCHLORIDE AND EPINEPHRINE 5; 5 MG/ML; UG/ML
INJECTION, SOLUTION EPIDURAL; INTRACAUDAL; PERINEURAL
Status: DISCONTINUED | OUTPATIENT
Start: 2020-12-11 | End: 2020-12-11 | Stop reason: HOSPADM

## 2020-12-11 RX ORDER — HYDROMORPHONE HYDROCHLORIDE 1 MG/ML
0.1 INJECTION, SOLUTION INTRAMUSCULAR; INTRAVENOUS; SUBCUTANEOUS
Status: DISCONTINUED | OUTPATIENT
Start: 2020-12-11 | End: 2020-12-11 | Stop reason: HOSPADM

## 2020-12-11 RX ORDER — ACETAMINOPHEN 500 MG
1000 TABLET ORAL ONCE
COMMUNITY
End: 2022-05-06

## 2020-12-11 RX ORDER — ONDANSETRON 2 MG/ML
INJECTION INTRAMUSCULAR; INTRAVENOUS PRN
Status: DISCONTINUED | OUTPATIENT
Start: 2020-12-11 | End: 2020-12-11 | Stop reason: SURG

## 2020-12-11 RX ORDER — ONDANSETRON 2 MG/ML
4 INJECTION INTRAMUSCULAR; INTRAVENOUS
Status: COMPLETED | OUTPATIENT
Start: 2020-12-11 | End: 2020-12-11

## 2020-12-11 RX ORDER — MAGNESIUM HYDROXIDE 1200 MG/15ML
LIQUID ORAL
Status: COMPLETED | OUTPATIENT
Start: 2020-12-11 | End: 2020-12-11

## 2020-12-11 RX ORDER — LIDOCAINE HYDROCHLORIDE 20 MG/ML
JELLY TOPICAL PRN
Status: DISCONTINUED | OUTPATIENT
Start: 2020-12-11 | End: 2020-12-11 | Stop reason: SURG

## 2020-12-11 RX ORDER — AMOXICILLIN AND CLAVULANATE POTASSIUM 875; 125 MG/1; MG/1
1 TABLET, FILM COATED ORAL 2 TIMES DAILY
COMMUNITY
Start: 2020-12-07 | End: 2021-05-24

## 2020-12-11 RX ORDER — DIPHENHYDRAMINE HYDROCHLORIDE 50 MG/ML
12.5 INJECTION INTRAMUSCULAR; INTRAVENOUS
Status: DISCONTINUED | OUTPATIENT
Start: 2020-12-11 | End: 2020-12-11 | Stop reason: HOSPADM

## 2020-12-11 RX ORDER — HALOPERIDOL 5 MG/ML
1 INJECTION INTRAMUSCULAR
Status: DISCONTINUED | OUTPATIENT
Start: 2020-12-11 | End: 2020-12-11 | Stop reason: HOSPADM

## 2020-12-11 RX ORDER — MEPERIDINE HYDROCHLORIDE 25 MG/ML
6.25 INJECTION INTRAMUSCULAR; INTRAVENOUS; SUBCUTANEOUS
Status: DISCONTINUED | OUTPATIENT
Start: 2020-12-11 | End: 2020-12-11 | Stop reason: HOSPADM

## 2020-12-11 RX ORDER — HYDROMORPHONE HYDROCHLORIDE 1 MG/ML
0.2 INJECTION, SOLUTION INTRAMUSCULAR; INTRAVENOUS; SUBCUTANEOUS
Status: DISCONTINUED | OUTPATIENT
Start: 2020-12-11 | End: 2020-12-11 | Stop reason: HOSPADM

## 2020-12-11 RX ORDER — OXYCODONE HYDROCHLORIDE AND ACETAMINOPHEN 5; 325 MG/1; MG/1
1 TABLET ORAL
Status: COMPLETED | OUTPATIENT
Start: 2020-12-11 | End: 2020-12-11

## 2020-12-11 RX ORDER — SODIUM CHLORIDE, SODIUM LACTATE, POTASSIUM CHLORIDE, CALCIUM CHLORIDE 600; 310; 30; 20 MG/100ML; MG/100ML; MG/100ML; MG/100ML
INJECTION, SOLUTION INTRAVENOUS CONTINUOUS
Status: DISCONTINUED | OUTPATIENT
Start: 2020-12-11 | End: 2020-12-11 | Stop reason: HOSPADM

## 2020-12-11 RX ORDER — DEXAMETHASONE SODIUM PHOSPHATE 4 MG/ML
INJECTION, SOLUTION INTRA-ARTICULAR; INTRALESIONAL; INTRAMUSCULAR; INTRAVENOUS; SOFT TISSUE PRN
Status: DISCONTINUED | OUTPATIENT
Start: 2020-12-11 | End: 2020-12-11 | Stop reason: SURG

## 2020-12-11 RX ORDER — LIDOCAINE HYDROCHLORIDE 20 MG/ML
INJECTION, SOLUTION EPIDURAL; INFILTRATION; INTRACAUDAL; PERINEURAL PRN
Status: DISCONTINUED | OUTPATIENT
Start: 2020-12-11 | End: 2020-12-11 | Stop reason: SURG

## 2020-12-11 RX ORDER — ONDANSETRON 4 MG/1
4 TABLET, FILM COATED ORAL EVERY 4 HOURS PRN
Qty: 12 TAB | Refills: 1 | Status: SHIPPED | OUTPATIENT
Start: 2020-12-11 | End: 2020-12-14

## 2020-12-11 RX ORDER — OXYCODONE HYDROCHLORIDE 5 MG/1
5 TABLET ORAL EVERY 4 HOURS PRN
Qty: 18 TAB | Refills: 0 | Status: SHIPPED | OUTPATIENT
Start: 2020-12-11 | End: 2020-12-14

## 2020-12-11 RX ORDER — HYDROMORPHONE HYDROCHLORIDE 1 MG/ML
0.4 INJECTION, SOLUTION INTRAMUSCULAR; INTRAVENOUS; SUBCUTANEOUS
Status: DISCONTINUED | OUTPATIENT
Start: 2020-12-11 | End: 2020-12-11 | Stop reason: HOSPADM

## 2020-12-11 RX ORDER — OXYCODONE HYDROCHLORIDE AND ACETAMINOPHEN 5; 325 MG/1; MG/1
2 TABLET ORAL
Status: COMPLETED | OUTPATIENT
Start: 2020-12-11 | End: 2020-12-11

## 2020-12-11 RX ORDER — CEFAZOLIN SODIUM 1 G/3ML
INJECTION, POWDER, FOR SOLUTION INTRAMUSCULAR; INTRAVENOUS PRN
Status: DISCONTINUED | OUTPATIENT
Start: 2020-12-11 | End: 2020-12-11 | Stop reason: SURG

## 2020-12-11 RX ADMIN — FENTANYL CITRATE 50 MCG: 50 INJECTION, SOLUTION INTRAMUSCULAR; INTRAVENOUS at 17:10

## 2020-12-11 RX ADMIN — FENTANYL CITRATE 50 MCG: 50 INJECTION, SOLUTION INTRAMUSCULAR; INTRAVENOUS at 16:55

## 2020-12-11 RX ADMIN — DEXAMETHASONE SODIUM PHOSPHATE 8 MG: 4 INJECTION, SOLUTION INTRA-ARTICULAR; INTRALESIONAL; INTRAMUSCULAR; INTRAVENOUS; SOFT TISSUE at 16:29

## 2020-12-11 RX ADMIN — ONDANSETRON 4 MG: 2 INJECTION INTRAMUSCULAR; INTRAVENOUS at 17:09

## 2020-12-11 RX ADMIN — SODIUM CHLORIDE, POTASSIUM CHLORIDE, SODIUM LACTATE AND CALCIUM CHLORIDE: 600; 310; 30; 20 INJECTION, SOLUTION INTRAVENOUS at 13:55

## 2020-12-11 RX ADMIN — FENTANYL CITRATE 25 MCG: 50 INJECTION, SOLUTION INTRAMUSCULAR; INTRAVENOUS at 17:42

## 2020-12-11 RX ADMIN — CEFAZOLIN 2 G: 330 INJECTION, POWDER, FOR SOLUTION INTRAMUSCULAR; INTRAVENOUS at 16:21

## 2020-12-11 RX ADMIN — FENTANYL CITRATE 50 MCG: 50 INJECTION, SOLUTION INTRAMUSCULAR; INTRAVENOUS at 16:38

## 2020-12-11 RX ADMIN — POVIDONE IODINE 15 ML: 100 SOLUTION TOPICAL at 13:55

## 2020-12-11 RX ADMIN — ONDANSETRON 4 MG: 2 INJECTION INTRAMUSCULAR; INTRAVENOUS at 18:58

## 2020-12-11 RX ADMIN — FENTANYL CITRATE 50 MCG: 50 INJECTION, SOLUTION INTRAMUSCULAR; INTRAVENOUS at 16:22

## 2020-12-11 RX ADMIN — PROPOFOL 200 MG: 10 INJECTION, EMULSION INTRAVENOUS at 16:25

## 2020-12-11 RX ADMIN — LIDOCAINE HYDROCHLORIDE 3 ML: 20 JELLY TOPICAL at 16:25

## 2020-12-11 RX ADMIN — MIDAZOLAM HYDROCHLORIDE 2 MG: 1 INJECTION, SOLUTION INTRAMUSCULAR; INTRAVENOUS at 16:18

## 2020-12-11 RX ADMIN — FENTANYL CITRATE 50 MCG: 50 INJECTION, SOLUTION INTRAMUSCULAR; INTRAVENOUS at 17:50

## 2020-12-11 RX ADMIN — LIDOCAINE HYDROCHLORIDE 5 ML: 20 INJECTION, SOLUTION EPIDURAL; INFILTRATION; INTRACAUDAL at 16:25

## 2020-12-11 RX ADMIN — HYDROMORPHONE HYDROCHLORIDE 0.4 MG: 1 INJECTION, SOLUTION INTRAMUSCULAR; INTRAVENOUS; SUBCUTANEOUS at 18:21

## 2020-12-11 RX ADMIN — PROPOFOL 100 MG: 10 INJECTION, EMULSION INTRAVENOUS at 16:52

## 2020-12-11 RX ADMIN — HYDROMORPHONE HYDROCHLORIDE 0.4 MG: 1 INJECTION, SOLUTION INTRAMUSCULAR; INTRAVENOUS; SUBCUTANEOUS at 18:30

## 2020-12-11 RX ADMIN — OXYCODONE HYDROCHLORIDE AND ACETAMINOPHEN 2 TABLET: 5; 325 TABLET ORAL at 17:44

## 2020-12-11 ASSESSMENT — PAIN DESCRIPTION - PAIN TYPE
TYPE: SURGICAL PAIN;ACUTE PAIN
TYPE: SURGICAL PAIN

## 2020-12-11 ASSESSMENT — PAIN SCALES - GENERAL: PAIN_LEVEL: 5

## 2020-12-11 ASSESSMENT — FIBROSIS 4 INDEX: FIB4 SCORE: 0.15

## 2020-12-11 NOTE — OR NURSING
Assume care for pt in pre-op. Patient allergies and NPO status verified. Belongings secured. Patient verbalizes understanding of pain scale, expected course of stay and plan of care. Surgical site verified with patient. IV access established. Blood samples sent to lab for cbc and bmp. Call light within reach. No further needs at this time. Hourly rounding in place.

## 2020-12-11 NOTE — ANESTHESIA PREPROCEDURE EVALUATION
Right breast infection    Relevant Problems   No relevant active problems     RA-on prednisone 10mg daily      Physical Exam    Airway   Mallampati: II  TM distance: >3 FB  Neck ROM: full       Cardiovascular - normal exam  Rhythm: regular  Rate: normal  (-) murmur     Dental - normal exam           Pulmonary - normal exam  Breath sounds clear to auscultation     Abdominal    Neurological - normal exam                 Anesthesia Plan    ASA 2       Plan - general       Airway plan will be LMA        Induction: intravenous    Postoperative Plan: Postoperative administration of opioids is intended.    Pertinent diagnostic labs and testing reviewed    Informed Consent:    Anesthetic plan and risks discussed with patient.    Use of blood products discussed with: patient whom consented to blood products.

## 2020-12-12 LAB — PATHOLOGY CONSULT NOTE: NORMAL

## 2020-12-12 NOTE — DISCHARGE INSTRUCTIONS
ACTIVITY: Rest and take it easy for the first 24 hours.  A responsible adult is recommended to remain with you during that time.  It is normal to feel sleepy.  We encourage you to not do anything that requires balance, judgment or coordination.    MILD FLU-LIKE SYMPTOMS ARE NORMAL. YOU MAY EXPERIENCE GENERALIZED MUSCLE ACHES, THROAT IRRITATION, HEADACHE AND/OR SOME NAUSEA.    FOR 24 HOURS DO NOT:  Drive, operate machinery or run household appliances.  Drink beer or alcoholic beverages.   Make important decisions or sign legal documents.    D/C instructions:    1. DIET: Upon discharge from the hospital you may resume your normal preoperative diet. Depending on how you are feeling and whether you have nausea or not, you may wish to stay with a bland diet for the first few days. However, you can advance this as quickly as you feel ready.    2. ACTIVITIES: After discharge from the hospital, you may resume full routine activities. However, there should be no heavy lifting (greater than 15 pounds) and no strenuous activities until after your follow-up visit. Otherwise, routine activities of daily living are acceptable.    3. DRIVING: You may drive whenever you are off pain medications and are able to perform the activities needed to drive, i.e. turning, bending, twisting, etc.    4. BATHING: You may get the wound wet at any time after leaving the hospital. You may shower, but do not submerge in a bath for at least a week. Dressings may come off after 48 hours, but will peel off by themselves in the next 7-10 days.    5. BOWEL FUNCTION: Constipation is common after an operation, especially with pain medications. The combination of pain medication and decreased activity level can cause constipation in otherwise normal patients. If you feel this is occurring, take a laxative (Milk of Magnesia, Ex-Lax, Senokot, etc.) until the problem has resolved.    6. PAIN MEDICATION: You will be given a prescription for pain medication  at discharge. Please take these as directed. It is important to remember not to take medications on an empty stomach as this may cause nausea.    7.CALL IF YOU HAVE: (1) Fevers to more than 101F, (2) Unusual chest or leg pain, (3) Drainage or fluid from incision that may be foul smelling, increased tenderness or soreness at the wound or the wound edges are no longer together, redness or swelling at the incision site. Please do not hesitate to call with any other questions.     8. APPOINTMENT: Contact our office at [phone] for a follow-up appointment in 1 week following your procedure.    If you have any additional questions, please do not hesitate to call the office and speak to either myself or the physician on call.    Office address:  [address] [place] [address] B    Netta Brown MD  [facility]  [phone]          DIET: To avoid nausea, slowly advance diet as tolerated, avoiding spicy or greasy foods for the first day.  Add more substantial food to your diet according to your physician's instructions.  Babies can be fed formula or breast milk as soon as they are hungry.  INCREASE FLUIDS AND FIBER TO AVOID CONSTIPATION.        FOLLOW-UP APPOINTMENT:  A follow-up appointment should be arranged with your doctor in 1 week; call to schedule.    You should CALL YOUR PHYSICIAN if you develop:  Fever greater than 101 degrees F.  Pain not relieved by medication, or persistent nausea or vomiting.  Excessive bleeding (blood soaking through dressing) or unexpected drainage from the wound.  Extreme redness or swelling around the incision site, drainage of pus or foul smelling drainage.  Inability to urinate or empty your bladder within 8 hours.  Problems with breathing or chest pain.    You should call 911 if you develop problems with breathing or chest pain.  If you are unable to contact your doctor or surgical center, you should go to the nearest emergency room or urgent care center.   Physician's telephone #: [phone]    If any questions arise, call your doctor.  If your doctor is not available, please feel free to call the Surgical Center at [phone]. The Contact Center is open Monday through Friday 7AM to 5PM and may speak to a nurse at [phone], or toll free at [phone].     A registered nurse may call you a few days after your surgery to see how you are doing after your procedure.    MEDICATIONS: Resume taking daily medication.  Take prescribed pain medication with food.  If no medication is prescribed, you may take non-aspirin pain medication if needed.  PAIN MEDICATION CAN BE VERY CONSTIPATING.  Take a stool softener or laxative such as senokot, pericolace, or milk of magnesia if needed.    Prescription given for Roxicodone and zofran  Last pain medication given at 5:40 pm 10mg Oxycodone with Tylenol.     If your physician has prescribed pain medication that includes Acetaminophen (Tylenol), do not take additional Acetaminophen (Tylenol) while taking the prescribed medication.    Depression / Suicide Risk    As you are discharged from this [facility] facility, it is important to learn how to keep safe from harming yourself.    Recognize the warning signs:  · Abrupt changes in personality, positive or negative- including increase in energy   · Giving away possessions  · Change in eating patterns- significant weight changes-  positive or negative  · Change in sleeping patterns- unable to sleep or sleeping all the time   · Unwillingness or inability to communicate  · Depression  · Unusual sadness, discouragement and loneliness  · Talk of wanting to die  · Neglect of personal appearance   · Rebelliousness- reckless behavior  · Withdrawal from people/activities they love  · Confusion- inability to concentrate     If you or a loved one observes any of these behaviors or has concerns about self-harm, here's what you can do:  · Talk about it- your feelings and reasons for  harming yourself  · Remove any means that you might use to hurt yourself (examples: pills, rope, extension cords, firearm)  · Get professional help from the community (Mental Health, Substance Abuse, psychological counseling)  · Do not be alone:Call your Safe Contact- someone whom you trust who will be there for you.  · Call your local CRISIS HOTLINE 565-7976 or 271-169-8676  · Call your local Children's Mobile Crisis Response Team [place] (480) 124-1749 or www.PayActiv  · Call the toll free National Suicide Prevention Hotlines   · National Suicide Prevention Lifeline 719-178-BYFC (4141)  · National Hope Line Network 800-SUICIDE (459-9707)

## 2020-12-12 NOTE — ANESTHESIA POSTPROCEDURE EVALUATION
Patient: Suellen Barbosa    Procedure Summary     Date: 12/11/20 Room / Location: [facility] 08 / SURGERY [facility]    Anesthesia Start: 1617 Anesthesia Stop: 1726    Procedure: BIOPSY, BREAST- FOR EXPLORATION AND DEBRIDEMENT WOUND, EXCISION OF MAMMERY FISTULA (Right Breast) Diagnosis: (Mammery Fistula)    Surgeons: Netta Blanca M.D. Responsible Provider: Yadiel James M.D.    Anesthesia Type: general ASA Status: 2          Final Anesthesia Type: general  Last vitals  BP   Blood Pressure: 112/63    Temp   36.3 °C (97.4 °F)    Pulse   Pulse: 69   Resp   17    SpO2   93 %      Anesthesia Post Evaluation    Patient location during evaluation: PACU  Patient participation: complete - patient participated  Level of consciousness: awake and alert  Pain score: 5    Airway patency: patent  Anesthetic complications: no  Cardiovascular status: hemodynamically stable  Respiratory status: acceptable  Hydration status: euvolemic    PONV: none           Nurse Pain Score: 5 (NPRS)

## 2020-12-12 NOTE — OR NURSING
Pt. Ready to DC home. VSS, on RA, pt. States nausea is better, tolerating PO. Pt. Able to void x1. DC instructions reviewed with pt. All questions answered. IV removed. Surgical site C/D/I. Rx for Percocet and Zofran given to pt. Pt. D/c in w/c with RN to  at [facility].

## 2020-12-12 NOTE — OR NURSING
Arrived to Phase II after report from Jeniffer RN    VSS, denies nausea, reports pain 5/10 but tolerable. Ambulated from gurney to chair with standby assist.    Surgical site- dressing clean and dry. No drainage. 4x4 and medipore tape in place.     Alarms on and set to appropriate parameters. Call light within reach, rounding in place. Belongings given back to pt.

## 2020-12-12 NOTE — ANESTHESIA PROCEDURE NOTES
Airway    Date/Time: 12/11/2020 4:25 PM  Performed by: Yadiel James M.D.  Authorized by: Yadiel James M.D.     Location:  OR  Urgency:  Elective  Indications for Airway Management:  Anesthesia      Spontaneous Ventilation: absent    Sedation Level:  Deep  Preoxygenated: Yes    Mask Difficulty Assessment:  0 - not attempted  Final Airway Type:  Supraglottic airway  Final Supraglottic Airway:  Standard LMA    SGA Size:  4  Number of Attempts at Approach:  1

## 2020-12-12 NOTE — OP REPORT
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    Anesthesiologist/Type of Anesthesia:  Anesthesiologist: Yadiel James M.D./General    Surgical Staff:  Circulator: Sheri Alejandra R.N.  Relief Circulator: Mariluz Velez R.N.  Scrub Person: Matilda Mueller    Specimens removed if any:  ID Type Source Tests Collected by Time Destination   A : right breast fistula track  Tissue Breast PATHOLOGY SPECIMEN Netta Blanca M.D. 12/11/2020  4:53 PM    B : right breast fistula track new medial margin, stitch marks new margin Tissue Breast PATHOLOGY SPECIMEN Netta Blanca M.D. 12/11/2020  5:05 PM        Estimated Blood Loss: 50mL    Findings: chronic non healing abscess cavity with milk duct involvement and connection to the skin    Complications: none apparent    Indications:   This is an unfortunate 40-year-old woman who I met earlier this year for a right breast abscess of a months duration.  She underwent I&D and slowly healed if she was on prednisone.  Unfortunately since then every few weeks she has had decompression of a cloudy fluid from a area in her old scar.  I have drained this in the office and noted no growth in the effluent.  As this continues to happen I have diagnosed her with a mammary fistula.  She desires excision.  We discussed risk benefits and alternatives with her's including but not limited to, bleeding, infection, damage surrounding structures, need to open and allow the wound to heal by secondary intention, recurrence.  The patient understood and agreed to proceed.    Description of procedure:  The patient was brought to the operating room and placed in a  comfortable supine position on the operating room table with all appropriate points padded.  General anesthesia was induced without complication.  Timeout was all completed confirming correct patient correct site correct procedure and SCDs on and functioning.  Right breast was prepped with ChloraPrep and draped in usual sterile fashion.  I did use a Q-tip with methylene blue on the tip to try to define the tract but there was no clear epithelialized tract present.  Instead I insufflated the skin using quarter percent Marcaine with epi and a planned incision line and radially made this incision up towards the upper inner quadrant where the old abscess tract had been.  I dissected circumferentially around a large irregular seroma cavity.  I did find small areas of this milky clear fluid that the patient had been expressing spontaneously.  There did appear to be a milk duct involved in the cavity.  I dissected this all the way around circumferentially to healthy breast tissue.  After excision of the majority of the tissue I found a another digitation medially, which I excised as well and sent separately.  Bleeding points were controlled with Bovie and suture ligation.  I made sure also to remove the fistula tract in the skin.  I then irrigated copiously, confirmed excellent hemostasis and then closed the skin in layers using interrupted 3-0 Vicryl in the dermis and interrupted 4-0 Monocryl in the epidermis.  I dressed this with 4 x 4's and tape.  Patient was then awoken from anesthesia in good condition having tolerated the procedure well.  All counts were correct at the end the case x2.          12/11/2020 5:26 PM Netta Blanca M.D.

## 2020-12-12 NOTE — ANESTHESIA TIME REPORT
Anesthesia Start and Stop Event Times     Date Time Event    12/11/2020 1543 Ready for Procedure     1617 Anesthesia Start     1726 Anesthesia Stop        Responsible Staff  12/11/20    Name Role Begin End    Yadiel James M.D. Anesth 1617 1726        Preop Diagnosis (Free Text):  Pre-op Diagnosis     RIGHT BREAST CHRONC OPEN WOUND, RIGHT BREAST FISTULA        Preop Diagnosis (Codes):    Post op Diagnosis  Breast wound, right, initial encounter      Premium Reason  A. 3PM - 7AM    Comments:

## 2021-04-09 ENCOUNTER — HOSPITAL ENCOUNTER (OUTPATIENT)
Dept: RADIOLOGY | Facility: MEDICAL CENTER | Age: 41
End: 2021-04-09
Attending: PHYSICIAN ASSISTANT
Payer: MEDICAID

## 2021-04-09 ENCOUNTER — HOSPITAL ENCOUNTER (OUTPATIENT)
Dept: RADIOLOGY | Facility: MEDICAL CENTER | Age: 41
End: 2021-04-09
Attending: SURGERY
Payer: MEDICAID

## 2021-04-09 DIAGNOSIS — N64.0 FISSURE AND FISTULA OF NIPPLE: ICD-10-CM

## 2021-04-09 DIAGNOSIS — N61.0 ACUTE MASTITIS OF RIGHT BREAST: ICD-10-CM

## 2021-04-09 PROCEDURE — G0279 TOMOSYNTHESIS, MAMMO: HCPCS

## 2021-04-09 PROCEDURE — 76642 ULTRASOUND BREAST LIMITED: CPT | Mod: RT

## 2021-05-21 ENCOUNTER — HOSPITAL ENCOUNTER (EMERGENCY)
Facility: MEDICAL CENTER | Age: 41
End: 2021-05-21
Attending: EMERGENCY MEDICINE
Payer: MEDICAID

## 2021-05-21 ENCOUNTER — NURSE TRIAGE (OUTPATIENT)
Dept: HEALTH INFORMATION MANAGEMENT | Facility: OTHER | Age: 41
End: 2021-05-21

## 2021-05-21 VITALS
HEART RATE: 76 BPM | HEIGHT: 69 IN | TEMPERATURE: 97.5 F | BODY MASS INDEX: 35.27 KG/M2 | OXYGEN SATURATION: 100 % | SYSTOLIC BLOOD PRESSURE: 117 MMHG | WEIGHT: 238.1 LBS | RESPIRATION RATE: 16 BRPM | DIASTOLIC BLOOD PRESSURE: 72 MMHG

## 2021-05-21 DIAGNOSIS — K08.89 DENTALGIA: ICD-10-CM

## 2021-05-21 PROCEDURE — 700102 HCHG RX REV CODE 250 W/ 637 OVERRIDE(OP): Performed by: EMERGENCY MEDICINE

## 2021-05-21 PROCEDURE — A9270 NON-COVERED ITEM OR SERVICE: HCPCS | Performed by: EMERGENCY MEDICINE

## 2021-05-21 PROCEDURE — 99283 EMERGENCY DEPT VISIT LOW MDM: CPT

## 2021-05-21 RX ORDER — PENICILLIN V POTASSIUM 500 MG/1
500 TABLET ORAL EVERY 6 HOURS
Qty: 40 TABLET | Refills: 0 | Status: SHIPPED | OUTPATIENT
Start: 2021-05-21 | End: 2021-05-31

## 2021-05-21 RX ORDER — PENICILLIN V POTASSIUM 500 MG/1
500 TABLET ORAL ONCE
Status: COMPLETED | OUTPATIENT
Start: 2021-05-21 | End: 2021-05-21

## 2021-05-21 RX ORDER — ACETAMINOPHEN 325 MG/1
650 TABLET ORAL ONCE
Status: COMPLETED | OUTPATIENT
Start: 2021-05-21 | End: 2021-05-21

## 2021-05-21 RX ADMIN — PENICILLIN V POTASIUM 500 MG: 500 TABLET OROPHARYNGEAL at 14:48

## 2021-05-21 RX ADMIN — ACETAMINOPHEN 650 MG: 325 TABLET ORAL at 14:48

## 2021-05-21 ASSESSMENT — PAIN DESCRIPTION - DESCRIPTORS: DESCRIPTORS: ACHING

## 2021-05-21 ASSESSMENT — FIBROSIS 4 INDEX: FIB4 SCORE: 0.33

## 2021-05-21 NOTE — TELEPHONE ENCOUNTER
"Pt has had tooth pain x 2 weeks but noticed a lump in area last night and awoke with severe pain today 7/10. Pt has medicaid and having difficulty finding an emergency dental provider. Current Medicaid dental list emailed. Home care discussed and advised pt to see provider today and if unable, present to UC or ER. Pt agrees.     Reason for Disposition  • [1] SEVERE pain (e.g., excruciating, unable to do any normal activities) AND [2] not improved 2 hours after pain medicine    Additional Information  • Negative: Shock suspected (e.g., cold/pale/clammy skin, too weak to stand, low BP, rapid pulse)  • Negative: [1] Similar pain previously AND [2] it was from \"heart attack\"  • Negative: [1] Similar pain previously AND [2] it was from \"angina\" AND [3] not relieved by nitroglycerin  • Negative: Sounds like a life-threatening emergency to the triager  • Negative: Chest pain  • Negative: Toothache followed tooth injury  • Negative: Toothache or mouth pain after tooth extraction  • Negative: Canker sore (i.e., aphthous ulcer)  • Negative: Tongue is very swollen and tender  • Negative: [1] Face is swollen AND [2] fever  • Negative: Patient sounds very sick or weak to the triager    Answer Assessment - Initial Assessment Questions  1. LOCATION: \"Which tooth is hurting?\"  (e.g., right-side/left-side, upper/lower, front/back)      Bottom left  2. ONSET: \"When did the toothache start?\"  (e.g., hours, days)       2 weeks but today severe  3. SEVERITY: \"How bad is the toothache?\"  (Scale 1-10; mild, moderate or severe)    - MILD (1-3): doesn't interfere with chewing     - MODERATE (4-7): interferes with chewing, interferes with normal activities, awakens from sleep      - SEVERE (8-10): unable to eat, unable to do any normal activities, excruciating pain         7  4. SWELLING: \"Is there any visible swelling of your face?\"      Lump inside mouth on gums  5. OTHER SYMPTOMS: \"Do you have any other symptoms?\" (e.g., fever)      " "no  6. PREGNANCY: \"Is there any chance you are pregnant?\" \"When was your last menstrual period?\"      no    Protocols used: TOOTHACHE-A-AH      "

## 2021-05-21 NOTE — ED NOTES
Pt given d/c paperwork and RX p/u information, pt verbalized understanding all information given. Pt ambulated out of the ER w/o difficulty

## 2021-05-21 NOTE — ED PROVIDER NOTES
ED Provider Note    CHIEF COMPLAINT  Chief Complaint   Patient presents with   • Dental Pain   • Neck Swelling   • Earache       HPI  Suellen Barbosa is a 40 y.o. female who presents tooth pain on the left mandible region.  Hot and cold bothers that she had a blister next to it that popped and she had some blood she has some swelling comes in for evaluation no fever chills all other systems are negative    REVIEW OF SYSTEMS  See HPI for further details. All other systems are negative.      PAST MEDICAL HISTORY  Past Medical History:   Diagnosis Date   • Obesity (BMI 30-39.9) 9/10/2020   • Anemia     pt unsure what kind of anemia; received blood transfusion at [facility] on 8/1/2020       FAMILY HISTORY  History reviewed. No pertinent family history.    SOCIAL HISTORY  Social History     Socioeconomic History   • Marital status: Single     Spouse name: Not on file   • Number of children: Not on file   • Years of education: Not on file   • Highest education level: Not on file   Occupational History   • Not on file   Tobacco Use   • Smoking status: Never Smoker   • Smokeless tobacco: Never Used   Vaping Use   • Vaping Use: Never used   Substance and Sexual Activity   • Alcohol use: Never   • Drug use: Never   • Sexual activity: Not on file   Other Topics Concern   • Not on file   Social History Narrative   • Not on file     Social Determinants of Health     Financial Resource Strain: Medium Risk   • Difficulty of Paying Living Expenses: Somewhat hard   Food Insecurity: Food Insecurity Present   • Worried About Running Out of Food in the Last Year: Sometimes true   • Ran Out of Food in the Last Year: Sometimes true   Transportation Needs: No Transportation Needs   • Lack of Transportation (Medical): No   • Lack of Transportation (Non-Medical): No   Physical Activity:    • Days of Exercise per Week:    • Minutes of Exercise per Session:    Stress:    • Feeling of Stress :    Social Connections:    • Frequency  "of Communication with Friends and Family:    • Frequency of Social Gatherings with Friends and Family:    • Attends Ca[employer] Services:    • Active Member of Clubs or Organizations:    • Attends Club or Organization Meetings:    • Marital Status:    Intimate Partner Violence:    • Fear of Current or Ex-Partner:    • Emotionally Abused:    • Physically Abused:    • Sexually Abused:        SURGICAL HISTORY  Past Surgical History:   Procedure Laterality Date   • BREAST BIOPSY Right 12/11/2020    Procedure: BIOPSY, BREAST- FOR EXPLORATION AND DEBRIDEMENT WOUND, EXCISION OF MAMMERY FISTULA;  Surgeon: Netta Blanca M.D.;  Location: SURGERY [facility];  Service: General   • INCISION AND DRAINAGE GENERAL Right 9/10/2020    Procedure: INCISION AND DRAINAGE;  Surgeon: Netta Blanca M.D.;  Location: SURGERY [facility];  Service: General   • BREAST BIOPSY Right 9/10/2020    Procedure: BIOPSY, BREAST;  Surgeon: Netta Blanca M.D.;  Location: SURGERY [facility];  Service: General   • HYSTERECTOMY LAPAROSCOPY  2020   • PRIMARY C SECTION      x2       CURRENT MEDICATIONS  Home Medications    **Home medications have not yet been reviewed for this encounter**         ALLERGIES  No Known Allergies    PHYSICAL EXAM  VITAL SIGNS: /83   Pulse 70   Temp 36.1 °C (97 °F) (Temporal)   Resp 18   Ht 1.753 m (5' 9\")   Wt 108 kg (238 lb 1.6 oz)   LMP 08/26/2020 (Approximate)   SpO2 99%   BMI 35.16 kg/m²       Constitutional :  Well developed, Well nourished, No acute distress, Non-toxic appearance.   HENT: Good dentition was percussion of the mid molar on the left mandible no trismus drooling or stridor  Eyes: No conjunctivitis  Neck: Normal range of motion, No tenderness, Supple, No stridor.   Lymphatic: Negative anterior cervical lymphadenopathy.   Cardiovascular: Normal heart rate, Normal rhythm, No murmurs, No rubs, No gallops.   Thorax & Lungs: There to auscultation  Skin: Warm, Dry, No erythema, No " rash.   Extremities: Intact distal pulses, No edema, No tenderness, No cyanosis, No clubbing.     COURSE & MEDICAL DECISION MAKING  Pertinent Labs & Imaging studies reviewed. (See chart for details)  Patient appears to have dental pain from infection, placed on penicillin first dose in the ER.  She is given return precautions and follow-up.    FINAL IMPRESSION  1.   1. Dentalgia         2.   3.      Electronically signed by: Waldo Dial M.D., 5/21/2021

## 2021-05-21 NOTE — ED TRIAGE NOTES
"C/O left lower molar pain with mild neck swelling, and left earache recurring for approximately 2 weeks, and worsening through last night.   Chief Complaint   Patient presents with   • Dental Pain   • Neck Swelling   • Earache     /83   Pulse 70   Temp 36.1 °C (97 °F) (Temporal)   Resp 18   Ht 1.753 m (5' 9\")   Wt 108 kg (238 lb 1.6 oz)   LMP 08/26/2020 (Approximate)   SpO2 99%   BMI 35.16 kg/m²      "

## 2021-05-24 ENCOUNTER — PRE-ADMISSION TESTING (OUTPATIENT)
Dept: ADMISSIONS | Facility: MEDICAL CENTER | Age: 41
End: 2021-05-24
Attending: SURGERY
Payer: MEDICAID

## 2021-05-24 DIAGNOSIS — Z01.812 PRE-OPERATIVE LABORATORY EXAMINATION: ICD-10-CM

## 2021-05-24 LAB
ERYTHROCYTE [DISTWIDTH] IN BLOOD BY AUTOMATED COUNT: 42.2 FL (ref 35.9–50)
HCT VFR BLD AUTO: 40.4 % (ref 37–47)
HGB BLD-MCNC: 13.5 G/DL (ref 12–16)
MCH RBC QN AUTO: 29.9 PG (ref 27–33)
MCHC RBC AUTO-ENTMCNC: 33.4 G/DL (ref 33.6–35)
MCV RBC AUTO: 89.6 FL (ref 81.4–97.8)
PLATELET # BLD AUTO: 301 K/UL (ref 164–446)
PMV BLD AUTO: 10 FL (ref 9–12.9)
RBC # BLD AUTO: 4.51 M/UL (ref 4.2–5.4)
WBC # BLD AUTO: 6.3 K/UL (ref 4.8–10.8)

## 2021-05-24 PROCEDURE — U0003 INFECTIOUS AGENT DETECTION BY NUCLEIC ACID (DNA OR RNA); SEVERE ACUTE RESPIRATORY SYNDROME CORONAVIRUS 2 (SARS-COV-2) (CORONAVIRUS DISEASE [COVID-19]), AMPLIFIED PROBE TECHNIQUE, MAKING USE OF HIGH THROUGHPUT TECHNOLOGIES AS DESCRIBED BY CMS-2020-01-R: HCPCS

## 2021-05-24 PROCEDURE — 36415 COLL VENOUS BLD VENIPUNCTURE: CPT

## 2021-05-24 PROCEDURE — U0005 INFEC AGEN DETEC AMPLI PROBE: HCPCS

## 2021-05-24 PROCEDURE — 85027 COMPLETE CBC AUTOMATED: CPT

## 2021-05-24 PROCEDURE — C9803 HOPD COVID-19 SPEC COLLECT: HCPCS

## 2021-05-24 RX ORDER — PREDNISONE 10 MG/1
10 TABLET ORAL
COMMUNITY
Start: 2020-10-30 | End: 2021-05-24

## 2021-05-24 RX ORDER — FERROUS SULFATE 325(65) MG
325 TABLET ORAL DAILY
COMMUNITY
End: 2022-05-06

## 2021-05-24 ASSESSMENT — FIBROSIS 4 INDEX: FIB4 SCORE: 0.33

## 2021-05-24 NOTE — OR NURSING
Called Dr. Blanca's office and left message for Katheryn regarding patient reports being seen in the ER for infected tooth on Saturday 5/21/21 and is being treated with antibiotics and can she let Dr. Blanca know and pt is coming in for PAT appt @ [phone] Angelika from Dr. Blanca's office called and said Dr Blanca is ok to proceed with surgery Wednesday with patient's report of infected tooth

## 2021-05-24 NOTE — PREPROCEDURE INSTRUCTIONS
"Patient present for preadmit appointment: \"Preparing for your Procedure information,\" pain management, patient safety, covid symptoms, self isolating and fasting protocol per anesthesia sheets given to patient with verbal and written instructions. Patient instructed to continue prescribed medications through the day before surgery, instructed to take the following medications the day of surgery per anesthesia protocol: tylenol/tramadol if needed educated to check with MD on Iron due to anemia. Patient instructed to stop blood thinner per MD instruction: n/a. Patient states no reaction to previous anesthesia. Pt reports no s/s of urinary tract infection. Covid appointment scheduled today. Patient educated to call MD's office if any symptoms of Covid appear. Patient understands education and has no other questions or concerns at this time.   "

## 2021-05-25 ENCOUNTER — ANESTHESIA EVENT (OUTPATIENT)
Dept: SURGERY | Facility: MEDICAL CENTER | Age: 41
End: 2021-05-25
Payer: MEDICAID

## 2021-05-25 LAB
SARS-COV-2 RNA RESP QL NAA+PROBE: NOTDETECTED
SPECIMEN SOURCE: NORMAL

## 2021-05-26 ENCOUNTER — HOSPITAL ENCOUNTER (OUTPATIENT)
Facility: MEDICAL CENTER | Age: 41
End: 2021-05-26
Attending: SURGERY | Admitting: SURGERY
Payer: MEDICAID

## 2021-05-26 ENCOUNTER — ANESTHESIA (OUTPATIENT)
Dept: SURGERY | Facility: MEDICAL CENTER | Age: 41
End: 2021-05-26
Payer: MEDICAID

## 2021-05-26 VITALS
TEMPERATURE: 97.5 F | OXYGEN SATURATION: 99 % | BODY MASS INDEX: 35.53 KG/M2 | HEART RATE: 64 BPM | WEIGHT: 239.86 LBS | SYSTOLIC BLOOD PRESSURE: 120 MMHG | RESPIRATION RATE: 16 BRPM | DIASTOLIC BLOOD PRESSURE: 68 MMHG | HEIGHT: 69 IN

## 2021-05-26 DIAGNOSIS — G89.18 ACUTE POST-OPERATIVE PAIN: ICD-10-CM

## 2021-05-26 PROBLEM — N63.0 BREAST LUMP: Status: ACTIVE | Noted: 2020-09-09

## 2021-05-26 LAB — PATHOLOGY CONSULT NOTE: NORMAL

## 2021-05-26 PROCEDURE — 700111 HCHG RX REV CODE 636 W/ 250 OVERRIDE (IP): Performed by: ANESTHESIOLOGY

## 2021-05-26 PROCEDURE — 160009 HCHG ANES TIME/MIN: Performed by: SURGERY

## 2021-05-26 PROCEDURE — 500002 HCHG ADHESIVE, DERMABOND: Performed by: SURGERY

## 2021-05-26 PROCEDURE — 160025 RECOVERY II MINUTES (STATS): Performed by: SURGERY

## 2021-05-26 PROCEDURE — A9270 NON-COVERED ITEM OR SERVICE: HCPCS | Performed by: SURGERY

## 2021-05-26 PROCEDURE — 88312 SPECIAL STAINS GROUP 1: CPT | Mod: 59

## 2021-05-26 PROCEDURE — 160035 HCHG PACU - 1ST 60 MINS PHASE I: Performed by: SURGERY

## 2021-05-26 PROCEDURE — 700101 HCHG RX REV CODE 250: Performed by: SURGERY

## 2021-05-26 PROCEDURE — 160039 HCHG SURGERY MINUTES - EA ADDL 1 MIN LEVEL 3: Performed by: SURGERY

## 2021-05-26 PROCEDURE — 700101 HCHG RX REV CODE 250: Performed by: ANESTHESIOLOGY

## 2021-05-26 PROCEDURE — 160002 HCHG RECOVERY MINUTES (STAT): Performed by: SURGERY

## 2021-05-26 PROCEDURE — 88307 TISSUE EXAM BY PATHOLOGIST: CPT

## 2021-05-26 PROCEDURE — 160046 HCHG PACU - 1ST 60 MINS PHASE II: Performed by: SURGERY

## 2021-05-26 PROCEDURE — 160028 HCHG SURGERY MINUTES - 1ST 30 MINS LEVEL 3: Performed by: SURGERY

## 2021-05-26 PROCEDURE — A9270 NON-COVERED ITEM OR SERVICE: HCPCS | Performed by: ANESTHESIOLOGY

## 2021-05-26 PROCEDURE — 700102 HCHG RX REV CODE 250 W/ 637 OVERRIDE(OP): Performed by: ANESTHESIOLOGY

## 2021-05-26 PROCEDURE — 160048 HCHG OR STATISTICAL LEVEL 1-5: Performed by: SURGERY

## 2021-05-26 PROCEDURE — 160036 HCHG PACU - EA ADDL 30 MINS PHASE I: Performed by: SURGERY

## 2021-05-26 PROCEDURE — 700105 HCHG RX REV CODE 258: Performed by: SURGERY

## 2021-05-26 RX ORDER — SODIUM CHLORIDE, SODIUM LACTATE, POTASSIUM CHLORIDE, CALCIUM CHLORIDE 600; 310; 30; 20 MG/100ML; MG/100ML; MG/100ML; MG/100ML
INJECTION, SOLUTION INTRAVENOUS CONTINUOUS
Status: DISCONTINUED | OUTPATIENT
Start: 2021-05-26 | End: 2021-05-26 | Stop reason: HOSPADM

## 2021-05-26 RX ORDER — ONDANSETRON 2 MG/ML
INJECTION INTRAMUSCULAR; INTRAVENOUS PRN
Status: DISCONTINUED | OUTPATIENT
Start: 2021-05-26 | End: 2021-05-26 | Stop reason: SURG

## 2021-05-26 RX ORDER — BUPIVACAINE HYDROCHLORIDE AND EPINEPHRINE 5; 5 MG/ML; UG/ML
INJECTION, SOLUTION EPIDURAL; INTRACAUDAL; PERINEURAL
Status: DISCONTINUED | OUTPATIENT
Start: 2021-05-26 | End: 2021-05-26 | Stop reason: HOSPADM

## 2021-05-26 RX ORDER — MIDAZOLAM HYDROCHLORIDE 1 MG/ML
INJECTION INTRAMUSCULAR; INTRAVENOUS PRN
Status: DISCONTINUED | OUTPATIENT
Start: 2021-05-26 | End: 2021-05-26 | Stop reason: SURG

## 2021-05-26 RX ORDER — OXYCODONE HCL 5 MG/5 ML
10 SOLUTION, ORAL ORAL
Status: COMPLETED | OUTPATIENT
Start: 2021-05-26 | End: 2021-05-26

## 2021-05-26 RX ORDER — DIPHENHYDRAMINE HYDROCHLORIDE 50 MG/ML
12.5 INJECTION INTRAMUSCULAR; INTRAVENOUS
Status: DISCONTINUED | OUTPATIENT
Start: 2021-05-26 | End: 2021-05-26 | Stop reason: HOSPADM

## 2021-05-26 RX ORDER — HALOPERIDOL 5 MG/ML
1 INJECTION INTRAMUSCULAR
Status: DISCONTINUED | OUTPATIENT
Start: 2021-05-26 | End: 2021-05-26 | Stop reason: HOSPADM

## 2021-05-26 RX ORDER — CEFAZOLIN SODIUM 1 G/3ML
INJECTION, POWDER, FOR SOLUTION INTRAMUSCULAR; INTRAVENOUS PRN
Status: DISCONTINUED | OUTPATIENT
Start: 2021-05-26 | End: 2021-05-26 | Stop reason: SURG

## 2021-05-26 RX ORDER — ACETAMINOPHEN 500 MG
1000 TABLET ORAL ONCE
Status: COMPLETED | OUTPATIENT
Start: 2021-05-26 | End: 2021-05-26

## 2021-05-26 RX ORDER — CELECOXIB 200 MG/1
400 CAPSULE ORAL ONCE
Status: COMPLETED | OUTPATIENT
Start: 2021-05-26 | End: 2021-05-26

## 2021-05-26 RX ORDER — ONDANSETRON 4 MG/1
4 TABLET, ORALLY DISINTEGRATING ORAL EVERY 6 HOURS PRN
Qty: 12 TABLET | Refills: 1 | Status: SHIPPED | OUTPATIENT
Start: 2021-05-26 | End: 2022-03-18

## 2021-05-26 RX ORDER — OXYCODONE HYDROCHLORIDE 5 MG/1
5 TABLET ORAL EVERY 4 HOURS PRN
Qty: 18 TABLET | Refills: 0 | Status: SHIPPED | OUTPATIENT
Start: 2021-05-26 | End: 2021-05-29

## 2021-05-26 RX ORDER — OXYCODONE HCL 5 MG/5 ML
5 SOLUTION, ORAL ORAL
Status: COMPLETED | OUTPATIENT
Start: 2021-05-26 | End: 2021-05-26

## 2021-05-26 RX ORDER — LIDOCAINE HYDROCHLORIDE 20 MG/ML
INJECTION, SOLUTION EPIDURAL; INFILTRATION; INTRACAUDAL; PERINEURAL PRN
Status: DISCONTINUED | OUTPATIENT
Start: 2021-05-26 | End: 2021-05-26 | Stop reason: SURG

## 2021-05-26 RX ORDER — DEXAMETHASONE SODIUM PHOSPHATE 4 MG/ML
INJECTION, SOLUTION INTRA-ARTICULAR; INTRALESIONAL; INTRAMUSCULAR; INTRAVENOUS; SOFT TISSUE PRN
Status: DISCONTINUED | OUTPATIENT
Start: 2021-05-26 | End: 2021-05-26 | Stop reason: SURG

## 2021-05-26 RX ORDER — ONDANSETRON 2 MG/ML
4 INJECTION INTRAMUSCULAR; INTRAVENOUS
Status: DISCONTINUED | OUTPATIENT
Start: 2021-05-26 | End: 2021-05-26 | Stop reason: HOSPADM

## 2021-05-26 RX ADMIN — CEFAZOLIN 2 G: 1 INJECTION, POWDER, FOR SOLUTION INTRAVENOUS at 10:53

## 2021-05-26 RX ADMIN — FENTANYL CITRATE 25 MCG: 50 INJECTION, SOLUTION INTRAMUSCULAR; INTRAVENOUS at 11:30

## 2021-05-26 RX ADMIN — EPHEDRINE SULFATE 10 MG: 50 INJECTION INTRAMUSCULAR; INTRAVENOUS; SUBCUTANEOUS at 11:10

## 2021-05-26 RX ADMIN — OXYCODONE HYDROCHLORIDE 5 MG: 5 SOLUTION ORAL at 12:11

## 2021-05-26 RX ADMIN — DEXAMETHASONE SODIUM PHOSPHATE 8 MG: 4 INJECTION, SOLUTION INTRAMUSCULAR; INTRAVENOUS at 11:06

## 2021-05-26 RX ADMIN — ACETAMINOPHEN 1000 MG: 500 TABLET ORAL at 09:42

## 2021-05-26 RX ADMIN — FENTANYL CITRATE 25 MCG: 50 INJECTION, SOLUTION INTRAMUSCULAR; INTRAVENOUS at 11:09

## 2021-05-26 RX ADMIN — PROPOFOL 200 MG: 10 INJECTION, EMULSION INTRAVENOUS at 10:53

## 2021-05-26 RX ADMIN — SODIUM CHLORIDE, POTASSIUM CHLORIDE, SODIUM LACTATE AND CALCIUM CHLORIDE: 600; 310; 30; 20 INJECTION, SOLUTION INTRAVENOUS at 09:43

## 2021-05-26 RX ADMIN — FENTANYL CITRATE 25 MCG: 50 INJECTION, SOLUTION INTRAMUSCULAR; INTRAVENOUS at 10:53

## 2021-05-26 RX ADMIN — LIDOCAINE HYDROCHLORIDE 60 MG: 20 INJECTION, SOLUTION EPIDURAL; INFILTRATION; INTRACAUDAL; PERINEURAL at 10:53

## 2021-05-26 RX ADMIN — FENTANYL CITRATE 25 MCG: 50 INJECTION, SOLUTION INTRAMUSCULAR; INTRAVENOUS at 11:31

## 2021-05-26 RX ADMIN — EPHEDRINE SULFATE 10 MG: 50 INJECTION INTRAMUSCULAR; INTRAVENOUS; SUBCUTANEOUS at 11:13

## 2021-05-26 RX ADMIN — POVIDONE IODINE 15 ML: 100 SOLUTION TOPICAL at 09:42

## 2021-05-26 RX ADMIN — LIDOCAINE HYDROCHLORIDE 0.5 ML: 10 INJECTION, SOLUTION INFILTRATION; PERINEURAL at 09:42

## 2021-05-26 RX ADMIN — MIDAZOLAM HYDROCHLORIDE 1 MG: 1 INJECTION, SOLUTION INTRAMUSCULAR; INTRAVENOUS at 10:52

## 2021-05-26 RX ADMIN — ONDANSETRON 4 MG: 2 INJECTION INTRAMUSCULAR; INTRAVENOUS at 11:09

## 2021-05-26 RX ADMIN — FENTANYL CITRATE 25 MCG: 50 INJECTION, SOLUTION INTRAMUSCULAR; INTRAVENOUS at 12:11

## 2021-05-26 RX ADMIN — CELECOXIB 400 MG: 200 CAPSULE ORAL at 09:42

## 2021-05-26 ASSESSMENT — PAIN SCALES - GENERAL: PAIN_LEVEL: 0

## 2021-05-26 ASSESSMENT — FIBROSIS 4 INDEX: FIB4 SCORE: 0.25

## 2021-05-26 NOTE — OP REPORT
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 - Wound Class: Clean    Surgeon(s):  Netta Blanca M.D.    Anesthesiologist/Type of Anesthesia:  Anesthesiologist: Cassia Weeks M.D./General    Surgical Staff:  Circulator: Sheri Cope R.N.  Scrub Person: Sanket Batista    Specimens removed if any:  ID Type Source Tests Collected by Time Destination   A : Right Breast Mass 1  Tissue Breast PATHOLOGY SPECIMEN Netta Blanca M.D. 5/26/2021 11:14 AM    B : Right Breast Mass New Margin Tissue Breast PATHOLOGY SPECIMEN Netta Blanca M.D. 5/26/2021 11:16 AM        Estimated Blood Loss: 25mL    Findings: Firm area of breast tissue at the superior portion of the old mammary fistula excision, consistent with fat necrosis    Complications: None apparent    Indications:  This is a 40-year-old woman with a history of a right breast abscess with subsequent excision of a very large mammary fistula.  She has since developed an area of pain and tenderness in the right upper breast in the central portion, with ultrasound findings consistent with fat necrosis and scar only.  Due to the persistence of the pain she wished to have it excised.  We discussed risk benefits and alternatives with risks including, but not limited to, bleeding, infection, damage surrounding structures, recurrence, wound healing problems.  The patient understood and agreed to proceed.    Description of procedure:  The patient was brought to the operating room placed in comfortable supine position on the operating room table with all appropriate points padded.  General anesthesia was induced the complication.  Timeout was held  and completed confirming correct patient, correct site, correct procedure and SCDs on and functioning.  She received antibiotics prior to incision.  Her right breast and chest wall were prepped with ChloraPrep and draped in the usual sterile fashion.  An incision was made over the previously marked area where the patient and myself were able to palpate this painful mass.  This was taken down to the palpable mass using cautery.  The breast tissue was grasped with an Allis clamp and dissected with Bovie electrocautery circumferentially.  Once amputated, it was marked with a short stitch superior long stitch lateral.  This was passed off the table to pathology for frozen section.  On palpating the cavity I was able to still feel some firm tissue and so posteriorly I removed another margin with cautery.  Once removed I placed a stitch on the new margin for pathology and sent it for permanent section.    I obtained excellent hemostasis and irrigated the cavity well.  I then closed the skin in layers using interrupted 3-0 Vicryl in the dermis and a running subicular 4-0 Monocryl in the epidermis.  This was dressed with Dermabond.  Patient was then awoken from anesthesia in good condition, having tolerated the procedure well.  All counts were correct at the end of the case x2.        5/26/2021 11:34 AM Netta Blanca M.D.

## 2021-05-26 NOTE — ANESTHESIA PROCEDURE NOTES
Airway    Date/Time: 5/26/2021 10:57 AM  Performed by: Cassia Weeks M.D.  Authorized by: Cassia Weeks M.D.     Location:  OR  Urgency:  Elective  Difficult Airway: No    Indications for Airway Management:  Anesthesia      Spontaneous Ventilation: present    Sedation Level:  Deep  Preoxygenated: Yes    Patient Position:  Sniffing  MILS Maintained Throughout: No    Mask Difficulty Assessment:  0 - not attempted  Final Airway Type:  Supraglottic airway    SGA Size:  4  Number of Attempts at Approach:  1

## 2021-05-26 NOTE — ANESTHESIA POSTPROCEDURE EVALUATION
Patient: Suellen Barbosa    Procedure Summary     Date: 05/26/21 Room / Location:  OR 01 / SURGERY [facility]    Anesthesia Start: 1049 Anesthesia Stop: 1138    Procedure: BIOPSY, BREAST - EXCISION, PAINFUL MASS. (Right Breast) Diagnosis: (RIGHT BREAST MASS, UPPER CENTRAL PORTION)    Surgeons: Netta Blanca M.D. Responsible Provider: Cassia Weeks M.D.    Anesthesia Type: general ASA Status: 2          Final Anesthesia Type: general  Last vitals  BP   Blood Pressure: 118/67    Temp   36 °C (96.8 °F)    Pulse   72   Resp   16    SpO2   100 %      Anesthesia Post Evaluation    Patient location during evaluation: PACU  Patient participation: complete - patient participated  Level of consciousness: awake and alert  Pain score: 0    Airway patency: patent  Anesthetic complications: no  Cardiovascular status: adequate  Respiratory status: acceptable  Hydration status: acceptable    PONV: none          No complications documented.     Nurse Pain Score: 0 (NPRS)

## 2021-05-26 NOTE — ANESTHESIA PREPROCEDURE EVALUATION
Relevant Problems   No relevant active problems       Physical Exam    Airway   Mallampati: III  TM distance: >3 FB  Neck ROM: full       Cardiovascular   Rhythm: regular  Rate: normal     Dental    Pulmonary    Abdominal   (+) obese     Neurological     Comments: Patient reports back injury that occured Monday  with left sided flank, hip and leg pain and weakness             Anesthesia Plan    ASA 2       Plan - general       Airway plan will be LMA          Induction: intravenous      Pertinent diagnostic labs and testing reviewed    Informed Consent:    Anesthetic plan and risks discussed with patient.

## 2021-05-26 NOTE — OR NURSING
1252: To stage ll. Pain is tolerable w/o nausea.  1308: Home care instructions reviewed w/ pt and mother. No questions, meets criteria for discharge.

## 2021-05-26 NOTE — DISCHARGE INSTRUCTIONS
ACTIVITY: Rest and take it easy for the first 24 hours.  A responsible adult is recommended to remain with you during that time.  It is normal to feel sleepy.  We encourage you to not do anything that requires balance, judgment or coordination.    MILD FLU-LIKE SYMPTOMS ARE NORMAL. YOU MAY EXPERIENCE GENERALIZED MUSCLE ACHES, THROAT IRRITATION, HEADACHE AND/OR SOME NAUSEA.    FOR 24 HOURS DO NOT:  Drive, operate machinery or run household appliances.  Drink beer or alcoholic beverages.   Make important decisions or sign legal documents.    SPECIAL INSTRUCTIONS:     1. DIET: Upon discharge from the hospital you may resume your normal preoperative diet. Depending on how you are feeling and whether you have nausea or not, you may wish to stay with a bland diet for the first few days. However, you can advance this as quickly as you feel ready.     2. ACTIVITIES: After discharge from the hospital, you may resume full routine activities. However, there should be no heavy lifting (greater than 15 pounds) and no strenuous activities until after your follow-up visit. Otherwise, routine activities of daily living are acceptable.     3. DRIVING: You may drive whenever you are off pain medications and are able to perform the activities needed to drive, i.e. turning, bending, twisting, etc.     4. BATHING: You may get the wound wet at any time after leaving the hospital. You may shower, but do not submerge in a bath for at least a week. Dressings may come off after 48 hours, but will peel off by themselves in the next 7-10 days.     5. BOWEL FUNCTION: Constipation is common after an operation, especially with pain medications. The combination of pain medication and decreased activity level can cause constipation in otherwise normal patients. If you feel this is occurring, take a laxative (Milk of Magnesia, Ex-Lax, Senokot, etc.) until the problem has resolved.     6. PAIN MEDICATION: You will be given a prescription for pain  medication at discharge. Please take these as directed. It is important to remember not to take medications on an empty stomach as this may cause nausea.     7.CALL IF YOU HAVE: (1) Fevers to more than 101F, (2) Unusual chest or leg pain, (3) Drainage or fluid from incision that may be foul smelling, increased tenderness or soreness at the wound or the wound edges are no longer together, redness or swelling at the incision site. Please do not hesitate to call with any other questions.     8. APPOINTMENT: Contact our office at [phone] for a follow-up appointment in 1 week following your procedure.     If you have any additional questions, please do not hesitate to call the office and speak to either myself or the physician on call.     Office address:   [address] [place] [address] B     Netta Brown MD   [facility]   [phone]     DIET: To avoid nausea, slowly advance diet as tolerated, avoiding spicy or greasy foods for the first day.  Add more substantial food to your diet according to your physician's instructions.   INCREASE FLUIDS AND FIBER TO AVOID CONSTIPATION.          You should CALL YOUR PHYSICIAN if you develop:  Fever greater than 101 degrees F.  Pain not relieved by medication, or persistent nausea or vomiting.  Excessive bleeding (blood soaking through dressing) or unexpected drainage from the wound.  Extreme redness or swelling around the incision site, drainage of pus or foul smelling drainage.  Inability to urinate or empty your bladder within 8 hours.  Problems with breathing or chest pain.    You should call 911 if you develop problems with breathing or chest pain.  If you are unable to contact your doctor or surgical center, you should go to the nearest emergency room or urgent care center.  Physician's telephone #: [phone]    If any questions arise, call your doctor.  If your doctor is not available, please feel free to call the Surgical Center at  [phone]. The Contact Center is open Monday through Friday 7AM to 5PM and may speak to a nurse at [phone], or toll free at [phone].     A registered nurse may call you a few days after your surgery to see how you are doing after your procedure.    MEDICATIONS: Resume taking daily medication.  Take prescribed pain medication with food.  If no medication is prescribed, you may take non-aspirin pain medication if needed.  PAIN MEDICATION CAN BE VERY CONSTIPATING.  Take a stool softener or laxative such as senokot, pericolace, or milk of magnesia if needed.    Prescription given for oxycodone .  Last pain medication given at 1211    If your physician has prescribed pain medication that includes Acetaminophen (Tylenol), do not take additional Acetaminophen (Tylenol) while taking the prescribed medication.    Depression / Suicide Risk    As you are discharged from this [facility] facility, it is important to learn how to keep safe from harming yourself.    Recognize the warning signs:  · Abrupt changes in personality, positive or negative- including increase in energy   · Giving away possessions  · Change in eating patterns- significant weight changes-  positive or negative  · Change in sleeping patterns- unable to sleep or sleeping all the time   · Unwillingness or inability to communicate  · Depression  · Unusual sadness, discouragement and loneliness  · Talk of wanting to die  · Neglect of personal appearance   · Rebelliousness- reckless behavior  · Withdrawal from people/activities they love  · Confusion- inability to concentrate     If you or a loved one observes any of these behaviors or has concerns about self-harm, here's what you can do:  · Talk about it- your feelings and reasons for harming yourself  · Remove any means that you might use to hurt yourself (examples: pills, rope, extension cords, firearm)  · Get professional help from the community (Mental Health, Substance Abuse,  psychological counseling)  · Do not be alone:Call your Safe Contact- someone whom you trust who will be there for you.  · Call your local CRISIS HOTLINE 409-8217 or 847-466-9729  · Call your local Children's Mobile Crisis Response Team [place] (607) 880-5684 or www.Fast Society  · Call the toll free National Suicide Prevention Hotlines   · National Suicide Prevention Lifeline 577-614-VLNI (6657)  · National Hope Line Network 800-SUICIDE (953-5635)

## 2021-05-26 NOTE — OR NURSING
1134- pt arrives from OR to PACU following excision of mass on the right breast. The surgical incision is clean and dry, closed with dermabond, open to air. resp even and unlabored on 4L O2 via mask.     1145-pt denies having any pain in the surgical site, she does however have pain in the left low back and hip that is tolerable after repositioning.     1211-medicated for pain 5/10 low back     1220-pt reports pain level 4 and tolerable, declines any additional medications     1235-patient repots pain level 5/10 but tolerable and declines any medication     1241-patient meets criteria for transfer to stage II     1245-report called to Mor Little     1249-pt transferred to stage II

## 2021-05-26 NOTE — ANESTHESIA TIME REPORT
Anesthesia Start and Stop Event Times     Date Time Event    5/26/2021 1045 Ready for Procedure     1049 Anesthesia Start     1138 Anesthesia Stop        Responsible Staff  05/26/21    Name Role Begin End    Cassia Weeks M.D. Anesth 1049 1138        Preop Diagnosis (Free Text):  Pre-op Diagnosis     RIGHT BREAST MASS, UPPER CENTRAL PORTION        Preop Diagnosis (Codes):    Post op Diagnosis  Breast mass, right      Premium Reason  Non-Premium    Comments:

## 2021-06-12 ENCOUNTER — HOSPITAL ENCOUNTER (EMERGENCY)
Facility: MEDICAL CENTER | Age: 41
End: 2021-06-12
Attending: EMERGENCY MEDICINE
Payer: MEDICAID

## 2021-06-12 VITALS
OXYGEN SATURATION: 93 % | HEIGHT: 69 IN | DIASTOLIC BLOOD PRESSURE: 65 MMHG | BODY MASS INDEX: 34.64 KG/M2 | WEIGHT: 233.91 LBS | SYSTOLIC BLOOD PRESSURE: 121 MMHG | HEART RATE: 78 BPM | RESPIRATION RATE: 16 BRPM | TEMPERATURE: 97.4 F

## 2021-06-12 DIAGNOSIS — M27.3: ICD-10-CM

## 2021-06-12 PROCEDURE — 700102 HCHG RX REV CODE 250 W/ 637 OVERRIDE(OP): Performed by: EMERGENCY MEDICINE

## 2021-06-12 PROCEDURE — A9270 NON-COVERED ITEM OR SERVICE: HCPCS | Performed by: EMERGENCY MEDICINE

## 2021-06-12 PROCEDURE — 99283 EMERGENCY DEPT VISIT LOW MDM: CPT

## 2021-06-12 RX ORDER — OXYCODONE HYDROCHLORIDE AND ACETAMINOPHEN 5; 325 MG/1; MG/1
1 TABLET ORAL ONCE
Status: COMPLETED | OUTPATIENT
Start: 2021-06-12 | End: 2021-06-12

## 2021-06-12 RX ADMIN — OXYCODONE HYDROCHLORIDE AND ACETAMINOPHEN 1 TABLET: 5; 325 TABLET ORAL at 20:27

## 2021-06-12 ASSESSMENT — FIBROSIS 4 INDEX: FIB4 SCORE: 0.25

## 2021-06-13 NOTE — ED NOTES
Pt medicated for pain prior to leaving. Pt given discharge instructions. Pt verbalized understanding. RN to answer any questions pt had. Pt instructed not to drive after receiving narcotics; pt verbalized she has a ride. Pt instructed to follow up with oral surgeon and care at home. VSS. Pt ambulated out to front [facility].

## 2021-06-13 NOTE — ED PROVIDER NOTES
ED Provider Note    CHIEF COMPLAINT  Chief Complaint   Patient presents with   • Dental Pain       HPI  Suellen Barbosa is a 40 y.o. female who presents with dental pain.  The patient states she had an abscess to the left lower molar.  She states she had the tooth pulled at the [facility] on Thursday.  She states that under outpatient management.  The patient was on about a week of antibiotics prior to having the tooth pulled and she started a second course of antibiotics.  She continues have pain where the molar was removed.  She does not have any facial swelling.  She has not any fevers.  She denies nausea and vomiting.    REVIEW OF SYSTEMS  See HPI for further details. All other systems are negative.     PAST MEDICAL HISTORY  Past Medical History:   Diagnosis Date   • Dental infection 05/22/2021   • Obesity (BMI 30-39.9) 9/10/2020   • Anemia     pt unsure what kind of anemia; received blood transfusion at [facility] on 8/1/2020   • Arthritis     joints, not defined between osteo vs rheumatoid       FAMILY HISTORY  [unfilled]    SOCIAL HISTORY  Social History     Socioeconomic History   • Marital status:      Spouse name: Not on file   • Number of children: Not on file   • Years of education: Not on file   • Highest education level: Not on file   Occupational History   • Not on file   Tobacco Use   • Smoking status: Never Smoker   • Smokeless tobacco: Never Used   Vaping Use   • Vaping Use: Never used   Substance and Sexual Activity   • Alcohol use: Never   • Drug use: Never   • Sexual activity: Not on file   Other Topics Concern   • Not on file   Social History Narrative   • Not on file     Social Determinants of Health     Financial Resource Strain: Medium Risk   • Difficulty of Paying Living Expenses: Somewhat hard   Food Insecurity: Food Insecurity Present   • Worried About Running Out of Food in the Last Year: Sometimes true   • Ran Out of Food in the Last Year: Sometimes true  "  Transportation Needs: No Transportation Needs   • Lack of Transportation (Medical): No   • Lack of Transportation (Non-Medical): No   Physical Activity:    • Days of Exercise per Week:    • Minutes of Exercise per Session:    Stress:    • Feeling of Stress :    Social Connections:    • Frequency of Communication with Friends and Family:    • Frequency of Social Gatherings with Friends and Family:    • Attends Sabianist Services:    • Active Member of Clubs or Organizations:    • Attends Club or Organization Meetings:    • Marital Status:    Intimate Partner Violence:    • Fear of Current or Ex-Partner:    • Emotionally Abused:    • Physically Abused:    • Sexually Abused:        SURGICAL HISTORY  Past Surgical History:   Procedure Laterality Date   • BREAST BIOPSY Right 5/26/2021    Procedure: BIOPSY, BREAST - EXCISION, PAINFUL MASS.;  Surgeon: Netta Blanca M.D.;  Location: SURGERY [facility];  Service: General   • BREAST BIOPSY Right 12/11/2020    Procedure: BIOPSY, BREAST- FOR EXPLORATION AND DEBRIDEMENT WOUND, EXCISION OF MAMMERY FISTULA;  Surgeon: Netta Blanca M.D.;  Location: SURGERY [facility];  Service: General   • INCISION AND DRAINAGE GENERAL Right 9/10/2020    Procedure: INCISION AND DRAINAGE;  Surgeon: Netta Blanca M.D.;  Location: SURGERY [facility];  Service: General   • BREAST BIOPSY Right 9/10/2020    Procedure: BIOPSY, BREAST;  Surgeon: Netta Blanca M.D.;  Location: SURGERY [facility];  Service: General   • HYSTERECTOMY LAPAROSCOPY  2020   • PRIMARY C SECTION      x2       CURRENT MEDICATIONS  Home Medications    **Home medications have not yet been reviewed for this encounter**         ALLERGIES  No Known Allergies    PHYSICAL EXAM  VITAL SIGNS: /81   Pulse 82   Temp 36.2 °C (97.1 °F) (Temporal)   Resp 16   Ht 1.753 m (5' 9\")   Wt 106 kg (233 lb 14.5 oz)   LMP 08/26/2020 (Approximate)   SpO2 96%   BMI 34.54 kg/m²       Constitutional: Mild acute " distress, Non-toxic appearance.   HENT: Normocephalic, Atraumatic, Bilateral external ears normal, Oropharynx pain with palpation of the extraction site to the left lower molar region.  No gingival fluctuance nor induration, Nose normal.   Eyes: PERRLA, EOMI, Conjunctiva normal, No discharge.   Neck: Normal range of motion, No tenderness, Supple, No stridor.   Lymphatic: No lymphadenopathy noted.   Cardiovascular: Normal heart rate, Normal rhythm, No murmurs, No rubs, No gallops.   Thorax & Lungs: Normal breath sounds, No respiratory distress, No wheezing, No chest tenderness.   Abdomen: Bowel sounds normal, Soft, No tenderness, No masses, No pulsatile masses.   Skin: Warm, Dry, No erythema, No rash.   Back: No tenderness, No CVA tenderness.   Extremities: Intact distal pulses, No edema, No tenderness, No cyanosis, No clubbing.   Neurologic: Alert & oriented x 3, Normal motor function, Normal sensory function, No focal deficits noted.   Psychiatric: Affect normal, Judgment normal, Mood normal.     COURSE & MEDICAL DECISION MAKING  Pertinent Labs & Imaging studies reviewed. (See chart for details)  This a 40-year-old female who presents with dentalgia.  My exam is more consistent with alveolitis from her resected tooth.  I did speak with the oral surgeon Dr. Bay and he states he is can see her in the office tomorrow for outpatient management.  She will continue her antibiotics and she is instructed take anti-inflammatories.  She does not have any facial swelling.    FINAL IMPRESSION  1.  Alveolitis left lower molar      Disposition  The patient will be discharged in stable condition         Electronically signed by: Junior Pelletier M.D., 6/12/2021 7:19 PM

## 2021-06-13 NOTE — ED TRIAGE NOTES
"Chief Complaint   Patient presents with   • Dental Pain     41 yo female ambulatory to triage for above complaint. Pt had tooth pulled 3D ago, reports increasing pain and pus to site, currently taking Amoxicillin with no improvement. Airway intact.    Educated on triage process, encourage to inform staff of any changes.     /81   Pulse 82   Temp 36.2 °C (97.1 °F) (Temporal)   Resp 16   Ht 1.753 m (5' 9\")   Wt 106 kg (233 lb 14.5 oz)   LMP 08/26/2020 (Approximate)   SpO2 96%   BMI 34.54 kg/m²   "

## 2021-10-26 ENCOUNTER — HOSPITAL ENCOUNTER (EMERGENCY)
Facility: MEDICAL CENTER | Age: 41
End: 2021-10-26
Attending: EMERGENCY MEDICINE
Payer: MEDICAID

## 2021-10-26 VITALS
HEART RATE: 86 BPM | HEIGHT: 66 IN | DIASTOLIC BLOOD PRESSURE: 78 MMHG | TEMPERATURE: 97.2 F | WEIGHT: 237.66 LBS | SYSTOLIC BLOOD PRESSURE: 120 MMHG | RESPIRATION RATE: 18 BRPM | OXYGEN SATURATION: 97 % | BODY MASS INDEX: 38.19 KG/M2

## 2021-10-26 DIAGNOSIS — K04.7 DENTAL INFECTION: ICD-10-CM

## 2021-10-26 DIAGNOSIS — K08.89 PAIN, DENTAL: ICD-10-CM

## 2021-10-26 PROCEDURE — A9270 NON-COVERED ITEM OR SERVICE: HCPCS | Performed by: EMERGENCY MEDICINE

## 2021-10-26 PROCEDURE — 99283 EMERGENCY DEPT VISIT LOW MDM: CPT

## 2021-10-26 PROCEDURE — 700102 HCHG RX REV CODE 250 W/ 637 OVERRIDE(OP): Performed by: EMERGENCY MEDICINE

## 2021-10-26 RX ORDER — AMOXICILLIN AND CLAVULANATE POTASSIUM 875; 125 MG/1; MG/1
1 TABLET, FILM COATED ORAL ONCE
Status: COMPLETED | OUTPATIENT
Start: 2021-10-26 | End: 2021-10-26

## 2021-10-26 RX ORDER — AMOXICILLIN AND CLAVULANATE POTASSIUM 875; 125 MG/1; MG/1
1 TABLET, FILM COATED ORAL 2 TIMES DAILY
Qty: 20 TABLET | Refills: 0 | Status: SHIPPED | OUTPATIENT
Start: 2021-10-26 | End: 2021-11-05

## 2021-10-26 RX ADMIN — AMOXICILLIN AND CLAVULANATE POTASSIUM 1 TABLET: 875; 125 TABLET, FILM COATED ORAL at 13:19

## 2021-10-26 ASSESSMENT — FIBROSIS 4 INDEX: FIB4 SCORE: 0.26

## 2021-10-26 NOTE — ED PROVIDER NOTES
ED Provider Note    CHIEF COMPLAINT  Chief Complaint   Patient presents with   • Dental Pain     started on abx but didn't complete abx treatment, started to feel better and stopped taking them. right lower tooth abscess.  last seen by dentist a month ago.     • Headache     light headedness,    • Body Aches     started yesterday.        ANJALI Barbosa is a 41 y.o. female who presents for evaluation of dental pain.  The patient comes in by ambulance for this today.  She reports that about a month ago she was prescribed antibiotics for the same pain, she reports after couple days of antibiotics the pain resolved so she stopped taking antibiotics.  She states that she has had 2 appointments with her dentist that she missed since then.  Over the past week or so the pain has been increasing.  Yesterday she reports that her face became very swollen.  She feels a headache with generalized body aches as well.  No vomiting.  No neck pain or stiffness.  No other acute complaint    REVIEW OF SYSTEMS  Negative for fever, rash, chest pain, dyspnea, abdominal pain, back pain. All other systems are negative.     PAST MEDICAL HISTORY   has a past medical history of Anemia, Arthritis, Dental infection (05/22/2021), and Obesity (BMI 30-39.9) (9/10/2020).    SOCIAL HISTORY  Social History     Tobacco Use   • Smoking status: Never Smoker   • Smokeless tobacco: Never Used   Vaping Use   • Vaping Use: Never used   Substance and Sexual Activity   • Alcohol use: Never   • Drug use: Never   • Sexual activity: Not on file       SURGICAL HISTORY   has a past surgical history that includes incision and drainage general (Right, 9/10/2020); breast biopsy (Right, 9/10/2020); primary c section; hysterectomy laparoscopy (2020); breast biopsy (Right, 12/11/2020); and breast biopsy (Right, 5/26/2021).    CURRENT MEDICATIONS  I personally reviewed the medication list in the charting documentation.     ALLERGIES  No Known  "Allergies    PHYSICAL EXAM  VITAL SIGNS: /76   Pulse 76   Temp 36.2 °C (97.2 °F) (Temporal)   Resp 16   Ht 1.676 m (5' 6\")   Wt 108 kg (237 lb 10.5 oz)   LMP 08/26/2020 (Approximate)   SpO2 98%   BMI 38.36 kg/m²   Constitutional: Well appearing patient in no acute distress.  Awake and alert, not toxic nor ill in appearance.  HENT: Inspection of the face reveals no obvious facial edema.  Intraoral inspection reveals poor dentition.  The tender tooth is one of the right mandibular premolars.  No drainable abscess.  Neck: Comfortable movement without any obvious restriction in the range of motion.  Eyes: Conjunctiva normal, Non-icteric.   Chest: Normal nonlabored respirations.  Skin: The exposed portions of skin reveal no obvious rash or other abnormalities.  Musculoskeletal: No obvious restriction in the range of motion in all major joints.   Neurologic: Alert, No obvious focal deficits noted.   Psychiatric: Affect normal for clinical presentation    COURSE & MEDICAL DECISION MAKING  Pertinent Labs & Imaging studies reviewed. (See chart for details)    Encounter Summary: This is a very pleasant 41 y.o. female who unfortunately required evaluation in the emergency department today with a dental infection, comes in by ambulance, has been noncompliant with both her antibiotic treatment recently as well as her dental follow-up.  She appears quite well on exam.  She was on amoxicillin.  Since this has been intermittently and inconsistently taken, I will treat her with Augmentin and have stressed importance of following up with a dentist.      DISPOSITION: Discharge Home      FINAL IMPRESSION  1. Pain, dental    2. Dental infection        This dictation was created using voice recognition software. The accuracy of the dictation is limited to the abilities of the software. I expect there may be some errors of grammar and possibly content. The nursing notes were reviewed and certain aspects of this information " were incorporated into this note.    Electronically signed by: Dio Dick M.D., 10/26/2021 1:09 PM

## 2021-10-26 NOTE — ED NOTES
Pt discharged home as ordered by erp. Pt instructed to follow up with a dentist. Pt verbalized understanding. Pt instructed on where to  her RX. Pt left ambulating independently

## 2021-10-26 NOTE — ED TRIAGE NOTES
Pt ambulated to triage with   Chief Complaint   Patient presents with   • Dental Pain     started on abx but didn't complete abx treatment, started to feel better and stopped taking them. right lower tooth abscess.  last seen by dentist a month ago.     • Headache     light headedness,    • Body Aches     started yesterday.       Pt Informed regarding triage process and verbalized understanding to inform triage tech or RN for any changes in condition. Placed in lobby.

## 2022-03-16 NOTE — PROGRESS NOTES
[facility]  [address], [address], [place] [address]  Phone: [phone] / Fax: [phone]    RHEUMATOLOGY NEW PATIENT VISIT NOTE      DATE OF SERVICE: 03/18/2022    REFERRING PROVIDER:  SAUL Boles  [facility]      SUBJECTIVE:     CHIEF COMPLAINT:   Chief Complaint   Patient presents with   • New Patient     Multiple joint pain       HISTORY OF PRESENT ILLNESS:  Suellen Barbosa is a 41 y.o. female with pertinent history notable for iron deficiency anemia and abnormal uterine bleeding s/p hysterectomy in 2020. She reports that around 8/2020 when her twin children were playing with her on the bed, they jumped on her right breast which caused a right breast injury that resulted in recurrent pustular mass/fistula s/p incisions/drainges. Also in 2020 she developed widespread body pain following hosptalization for a severe infection/illness. Since then she has experienced whole body pain with morning stiffness lasting several hours and improving with hot bath and activity. However, she feels wiped by the end of the day following activity, does snore at night while sleeping, has daytime somnolence, and has been under significant stress with emotional disturbance in her relationship with her .  Pertinent labs as of 9/2020: Negative JEANMARIE, RF, ACPA, ESR, and CRP.    REVIEW OF SYSTEMS:  Except as noted in the history above, a complete review of systems with emphasis on autoimmune inflammatory conditions was otherwise negative for any significant symptoms.      ACTIVE PROBLEM LIST:  Patient Active Problem List   Diagnosis   • Breast lump   • Obesity (BMI 30-39.9)   • Joint stiffness   • Elevated C-reactive protein (CRP)   • Iron (Fe) deficiency anemia   • Mammary fistula   • Central pain syndrome   No problem-specific Assessment & Plan notes found for this encounter.      PAST MEDICAL HISTORY:  Past Medical History:   Diagnosis Date   •  Anemia     pt unsure what kind of anemia; received blood transfusion at [facility] on 8/1/2020   • Arthritis     joints, not defined between osteo vs rheumatoid   • Dental infection 05/22/2021   • Obesity (BMI 30-39.9) 9/10/2020       PAST SURGICAL HISTORY:  Past Surgical History:   Procedure Laterality Date   • BREAST BIOPSY Right 5/26/2021    Procedure: BIOPSY, BREAST - EXCISION, PAINFUL MASS.;  Surgeon: Netta Blanca M.D.;  Location: SURGERY [facility];  Service: General   • BREAST BIOPSY Right 12/11/2020    Procedure: BIOPSY, BREAST- FOR EXPLORATION AND DEBRIDEMENT WOUND, EXCISION OF MAMMERY FISTULA;  Surgeon: Netta Blanca M.D.;  Location: SURGERY [facility];  Service: General   • INCISION AND DRAINAGE GENERAL Right 9/10/2020    Procedure: INCISION AND DRAINAGE;  Surgeon: Netta Blanca M.D.;  Location: SURGERY [facility];  Service: General   • BREAST BIOPSY Right 9/10/2020    Procedure: BIOPSY, BREAST;  Surgeon: Netta Blanca M.D.;  Location: SURGERY [facility];  Service: General   • HYSTERECTOMY LAPAROSCOPY  2020   • PRIMARY C SECTION      x2       MEDICATIONS:  Current Outpatient Medications   Medication Sig Dispense Refill   • ferrous sulfate 325 (65 Fe) MG tablet Take 325 mg by mouth every day.     • VITAMIN D PO Take  by mouth every day.     • FOLIC ACID PO Take 1 Tab by mouth every day.     • acetaminophen (TYLENOL) 500 MG Tab Take 1,000 mg by mouth one time.     • Prenatal Multivit-Min-Fe-FA (PRENATAL/IRON) Tab Take 1 Tab by mouth every day. 30 Tab 0   • ondansetron (ZOFRAN ODT) 4 MG TABLET DISPERSIBLE Take 1 tablet by mouth every 6 hours as needed for Nausea. (Patient not taking: Reported on 3/18/2022) 12 tablet 1     No current facility-administered medications for this visit.       ALLERGIES:   No Known Allergies    IMMUNIZATIONS:  Immunization History   Administered Date(s) Administered   • Tdap Vaccine 05/15/2020       SOCIAL HISTORY:   Social History  "    Tobacco Use   • Smoking status: Never Smoker   • Smokeless tobacco: Never Used   Vaping Use   • Vaping Use: Never used   Substance Use Topics   • Alcohol use: Never   • Drug use: Never       FAMILY HISTORY:  History reviewed. No pertinent family history.   No reported family history of autoimmune rheumatic diseases.       OBJECTIVE:     Vital Signs: /70 (BP Location: Left arm, Patient Position: Sitting, BP Cuff Size: Large adult)   Pulse 73   Temp 36.5 °C (97.7 °F) (Temporal)   Resp 16   Ht 1.753 m (5' 9\")   Wt 110 kg (242 lb 12.8 oz)   SpO2 96% Body mass index is 35.86 kg/m².    General: Appears well and comfortable; no acute distress  Eyes: Extraocular muscles and vision intact; no conjunctival lesions  ENT: Oral mucosa pink and moist; no oral or nasal lesions  Head/Neck: Neck supple; no cervical LAD; no scalp lesions  Cardiovascular: Regular rate and rhythm; no murmurs  Respiratory: Clear to auscultation bilaterally; no wheezes, rales, or rhonchi  Gastrointestinal: Abdomen soft and non-tender; no masses or organomegaly  Integumentary: Skin soft and supple; no significant cutaneous lesions  Musculoskeletal: No tenderness, swelling, warmth, erythema, or signs of synovitis; no significant restriction in ROM  Neurologic: No focal sensory or motor deficits  Psychiatric: Mood and affect appropriate      LABORATORY RESULTS REVIEWED AND INTERPRETED BY ME:  Lab Results   Component Value Date/Time    WBC 6.3 05/24/2021 10:58 AM    RBC 4.51 05/24/2021 10:58 AM    HEMOGLOBIN 13.5 05/24/2021 10:58 AM    HEMATOCRIT 40.4 05/24/2021 10:58 AM    MCV 89.6 05/24/2021 10:58 AM    MCH 29.9 05/24/2021 10:58 AM    MCHC 33.4 (L) 05/24/2021 10:58 AM    RDW 42.2 05/24/2021 10:58 AM    PLATELETCT 301 05/24/2021 10:58 AM    MPV 10.0 05/24/2021 10:58 AM    NEUTS 7.78 (H) 10/20/2020 12:36 PM    LYMPHOCYTES 12.10 (L) 10/20/2020 12:36 PM    MONOCYTES 2.60 10/20/2020 12:36 PM    EOSINOPHILS 0.90 10/20/2020 12:36 PM    BASOPHILS " 0.00 10/20/2020 12:36 PM    HYPOCHROMIA 2+ (A) 10/20/2020 12:36 PM    ANISOCYTOSIS 1+ 10/20/2020 12:36 PM     Lab Results   Component Value Date/Time    SODIUM 136 12/11/2020 01:45 PM    POTASSIUM 4.1 12/11/2020 01:45 PM    CHLORIDE 103 12/11/2020 01:45 PM    CO2 22 12/11/2020 01:45 PM    GLUCOSE 96 12/11/2020 01:45 PM    BUN 11 12/11/2020 01:45 PM    CREATININE 0.76 12/11/2020 01:45 PM     Lab Results   Component Value Date/Time    ASTSGOT 9 (L) 10/20/2020 12:36 PM    ALTSGPT 23 10/20/2020 12:36 PM    ALKPHOSPHAT 72 10/20/2020 12:36 PM    TBILIRUBIN <0.2 10/20/2020 12:36 PM    TOTPROTEIN 7.4 10/20/2020 12:36 PM    ALBUMIN 4.1 10/20/2020 12:36 PM    CALCIUM 9.5 12/11/2020 01:45 PM     Lab Results   Component Value Date/Time    SEDRATEWES 11 09/25/2020 02:20 PM    CREACTPROT 0.12 09/25/2020 02:20 PM     Lab Results   Component Value Date/Time    RHEUMFACTN 13 09/15/2020 10:55 AM    CCPANTIBODY 7 09/15/2020 10:55 AM     Lab Results   Component Value Date/Time    ANTINUCAB None Detected 09/15/2020 10:55 AM     Lab Results   Component Value Date/Time    ANTIDNADS None Detected 09/15/2020 10:55 AM     Lab Results   Component Value Date/Time    CPKTOTAL 20 09/13/2020 07:48 PM     Lab Results   Component Value Date/Time    COLORURINE Yellow 09/14/2020 12:35 AM    SPECGRAVITY 1.010 09/14/2020 12:35 AM    PHURINE 7.0 09/14/2020 12:35 AM    GLUCOSEUR Negative 09/14/2020 12:35 AM    KETONES Negative 09/14/2020 12:35 AM    PROTEINURIN Negative 09/14/2020 12:35 AM     Lab Results   Component Value Date/Time    TSHULTRASEN 0.618 09/09/2020 06:15 PM    TSHULTRASEN 0.603 09/09/2020 06:15 PM     Lab Results   Component Value Date/Time    IRON 16 (L) 09/09/2020 06:15 PM    FOLATE 29.5 09/09/2020 06:15 PM       RADIOLOGY RESULTS REVIEWED AND INTERPRETED BY ME:    All relevant laboratory and imaging results reported on this note were reviewed and interpreted by me.      ASSESSMENT AND PLAN:     Suellen Barbosa is a 41 y.o. female  with history as noted above whose presentation merits the following diagnostic and clinical status impressions and recommendations:    1. Central pain syndrome  Her overall presentation suggests biomechanical pain secondary to myofascial pain or central pain syndrome of which fibromyalgia is a part. This was likely precipitated by her breast trauma and illness in 2020, contributed to by her iron deficiency anemia, and exacerbated by her probable obstructive sleep apnea and the emotional disturbances from her relationship. There is presently no clinical evidence to support the presence of any underlying autoimmune inflammatory process.  - Recommend polysomnography (sleep study)  - Recommend low-to-moderate intensity aerobic exercises  - Consider a neuromodulator, such as gabapentin or pregabalin    2. Iron deficiency anemia due to chronic blood loss  Likely a significant contributor to her central pain syndrome.  - Continue iron supplementation    FOLLOW-UP: Return for No follow-up needed.           Thank you for giving me the opportunity to participate in the care of Suellen Barbosa.    Dewey Eisenberg MD, MS  Rheumatologist, [facility]  , Department of Internal Medicine  [facility]

## 2022-03-16 NOTE — PATIENT INSTRUCTIONS
AFTER VISIT INSTRUCTIONS    Below are important information to help you navigate your healthcare needs and help us serve you safely and effectively:  • If lab tests and imaging studies were ordered, be sure to go get them done.  • If new prescriptions or refills were sent to the pharmacy, be sure to go pick them up.  • Take your medications exactly as prescribed unless instructed otherwise.  • Follow up with your primary care provider and/or specialists as scheduled.  • If there are significant findings from your lab tests and imaging studies, I will notify you with explanations via [employer] or phone call, otherwise you can view them on [employer] and let me know if you have any questions.  • Sign up for [employer] if you have not already done so, in order to have access to the results of your lab tests and imaging studies, and to be able to send and receive messages from me.  • Please note that [employer] messaging is for non-urgent matters that do not require immediate attention and are typically read only during office hours, so do not expect immediate responses from me.

## 2022-03-18 ENCOUNTER — OFFICE VISIT (OUTPATIENT)
Dept: RHEUMATOLOGY | Facility: MEDICAL CENTER | Age: 42
End: 2022-03-18
Payer: MEDICAID

## 2022-03-18 VITALS
HEART RATE: 73 BPM | WEIGHT: 242.8 LBS | RESPIRATION RATE: 16 BRPM | OXYGEN SATURATION: 96 % | TEMPERATURE: 97.7 F | HEIGHT: 69 IN | SYSTOLIC BLOOD PRESSURE: 102 MMHG | DIASTOLIC BLOOD PRESSURE: 70 MMHG | BODY MASS INDEX: 35.96 KG/M2

## 2022-03-18 DIAGNOSIS — G89.0 CENTRAL PAIN SYNDROME: ICD-10-CM

## 2022-03-18 DIAGNOSIS — D50.0 IRON DEFICIENCY ANEMIA DUE TO CHRONIC BLOOD LOSS: ICD-10-CM

## 2022-03-18 PROCEDURE — 99204 OFFICE O/P NEW MOD 45 MIN: CPT | Performed by: STUDENT IN AN ORGANIZED HEALTH CARE EDUCATION/TRAINING PROGRAM

## 2022-03-18 ASSESSMENT — FIBROSIS 4 INDEX: FIB4 SCORE: 0.26

## 2022-03-18 ASSESSMENT — PAIN SCALES - GENERAL: PAINLEVEL: 5=MODERATE PAIN

## 2022-04-01 NOTE — PROGRESS NOTES
"New Patient Note    Interventional Pain and Spine  Physiatry (Physical Medicine and Rehabilitation)     Patient Name: Suellen Barbosa  : 1980  Date of Service: 2022  PCP: PCP Chao HUGHES at [facility]  Referring Provider: No ref. provider found    Chief Complaint:   Chief Complaint   Patient presents with   • New Patient     Pain disorder with related psychological factors       HPI  HISTORY (2022):  Suellen Barbosa is a 41 y.o. female who presents today with pain \"all over the body\" that started 2 years ago around the time she started having marital issues. Denies physical trauma related to the onset of pain.    Her pain at its best-worse level during the course of the day is 7-10/10, respectively. Pain right now is 7/10 on the numeric pain scale. Pain worsens with stress, standing, side bending or twisting, walking upstairs and walking downstairs and improves with reduced stress and medications. Her pain interferes somewhat with ADLs. The patient denies new weakness, numbness, or bladder/bowel incontinence.  Able to manage pain by getting under warm blankets and taking a hot bath.    Also reports history of low back pain that has significantly improved with physical therapy and is no longer actively bothering her. States she is still working through marital issues and caring for many young children. Endorses feeling overwhelmed. 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 Endorses history of emotional pain.  Has not tried counseling in the past but feels ready to start.  Interested in counseling with someone who specializes in victims of crime.    Reports fam hx of lupus    Fibromyalgia SS scale score:  Fatigue - 0  Waking unrefreshed - 1  Cognitive symptoms - 0    The patient has done physical therapy for her low back with improvement.    Patient has tried the following medications with varied success (current meds in bold): "   toradol helped  Tramadol 1/2 tablet that is left over from surgeries PRN, takes about 1x per month.  One such dose of tramadol takes away pain for 2-3 weeks.  Tylenol PRN  Robaxin - no relief, unable to tolerate SE of drowsiness  celebrex - never tried    Therapeutic modalities and interventional therapies to date include:  - no targeted injections    Medical history includes iron deficiency anemia, abnormal uterine bleeding status post hysterectomy in 2020    Psychological testing for pain as depression and pain commonly coexist and need to both be treated.     Opioid Risk Score: 8     Interpretation of Opioid Risk Score   Score 0-3 = Low risk of abuse. Do UDS at least once per year.  Score 4-7 = Moderate risk of abuse. Do UDS 1-4 times per year.  Score 8+ = High risk of abuse. Refer to specialist.    PHQ  Depression Screen (PHQ-2/PHQ-9) 9/14/2020 9/15/2020 4/4/2022   PHQ-2 Total Score 0 0 -   PHQ-2 Total Score - - 3   PHQ-9 Total Score - - 12       Interpretation of PHQ-9 Total Score   Score Severity   1-4 No Depression   5-9 Mild Depression   10-14 Moderate Depression   15-19 Moderately Severe Depression   20-27 Severe Depression      Medical records review:  I reviewed the rheum note 3/18/22    ROS:   Red Flags ROS:   Fever, Chills, Sweats: Denies  Involuntary Weight Loss: Denies  Bladder Incontinence: Denies  Bowel Incontinence: denies  Saddle Anesthesia: Denies    All other systems reviewed and negative.     PMHx:   Past Medical History:   Diagnosis Date   • Anemia     pt unsure what kind of anemia; received blood transfusion at [facility] on 8/1/2020   • Arthritis     joints, not defined between osteo vs rheumatoid   • Dental infection 05/22/2021   • Obesity (BMI 30-39.9) 9/10/2020       PSHx:   Past Surgical History:   Procedure Laterality Date   • BREAST BIOPSY Right 5/26/2021    Procedure: BIOPSY, BREAST - EXCISION, PAINFUL MASS.;  Surgeon: Netta Blanca M.D.;  Location: SURGERY [facility]  [facility];  Service: General   • BREAST BIOPSY Right 12/11/2020    Procedure: BIOPSY, BREAST- FOR EXPLORATION AND DEBRIDEMENT WOUND, EXCISION OF MAMMERY FISTULA;  Surgeon: Netta Blanca M.D.;  Location: SURGERY [facility];  Service: General   • INCISION AND DRAINAGE GENERAL Right 9/10/2020    Procedure: INCISION AND DRAINAGE;  Surgeon: Netta Blanca M.D.;  Location: SURGERY [facility];  Service: General   • BREAST BIOPSY Right 9/10/2020    Procedure: BIOPSY, BREAST;  Surgeon: Netta Blanca M.D.;  Location: SURGERY [facility];  Service: General   • HYSTERECTOMY LAPAROSCOPY  2020   • PRIMARY C SECTION      x2       Family Hx:   History reviewed. No pertinent family history.    Social Hx:  Social History     Socioeconomic History   • Marital status:      Spouse name: Not on file   • Number of children: Not on file   • Years of education: Not on file   • Highest education level: Not on file   Occupational History   • Not on file   Tobacco Use   • Smoking status: Never Smoker   • Smokeless tobacco: Never Used   Vaping Use   • Vaping Use: Never used   Substance and Sexual Activity   • Alcohol use: Never   • Drug use: Never   • Sexual activity: Not on file   Other Topics Concern   • Not on file   Social History Narrative   • Not on file     Social Determinants of Health     Financial Resource Strain: Not on file   Food Insecurity: Not on file   Transportation Needs: Not on file   Physical Activity: Not on file   Stress: Not on file   Social Connections: Not on file   Intimate Partner Violence: Not on file   Housing Stability: Not on file       Allergies:  No Known Allergies    Medications: reviewed on epic.   Outpatient Medications Marked as Taking for the 4/4/22 encounter (Office Visit) with Nia Burt M.D.   Medication Sig Dispense Refill   • traMADol (ULTRAM) 50 MG Tab Take 50 mg by mouth every four hours as needed.     • ferrous sulfate 325 (65 Fe) MG tablet Take 325 mg by mouth every  "day.     • VITAMIN D PO Take  by mouth every day.     • acetaminophen (TYLENOL) 500 MG Tab Take 1,000 mg by mouth one time.     • Prenatal Multivit-Min-Fe-FA (PRENATAL/IRON) Tab Take 1 Tab by mouth every day. 30 Tab 0        Current Outpatient Medications on File Prior to Visit   Medication Sig Dispense Refill   • traMADol (ULTRAM) 50 MG Tab Take 50 mg by mouth every four hours as needed.     • ferrous sulfate 325 (65 Fe) MG tablet Take 325 mg by mouth every day.     • VITAMIN D PO Take  by mouth every day.     • acetaminophen (TYLENOL) 500 MG Tab Take 1,000 mg by mouth one time.     • Prenatal Multivit-Min-Fe-FA (PRENATAL/IRON) Tab Take 1 Tab by mouth every day. 30 Tab 0   • FOLIC ACID PO Take 1 Tab by mouth every day. (Patient not taking: Reported on 4/4/2022)       No current facility-administered medications on file prior to visit.         EXAMINATION     Physical Exam:   /78 (BP Location: Right arm, Patient Position: Sitting, BP Cuff Size: Adult)   Pulse 71   Temp 36.4 °C (97.6 °F) (Temporal)   Ht 1.753 m (5' 9\")   Wt 111 kg (244 lb 4.3 oz)   SpO2 95%     Constitutional:   Body Habitus: Body mass index is 36.07 kg/m².  Cooperation: Fully cooperates with exam  Appearance: Well-groomed, well-nourished.    Eyes: No scleral icterus to suggest severe liver disease, no proptosis to suggest severe hyperthyroidism    ENT -no obvious auditory deficits, no noticeable facial droop     Skin -no rashes or lesions noted     Respiratory-  breathing comfortably on room air, no audible wheezing    Cardiovascular-distal extremities warm and well perfused.  No lower extremity edema is noted.     Gastrointestinal - no obvious abdominal masses, non-distended    Psychiatric- alert and oriented ×3. Normal affect.     Gait - normal gait, no use of ambulatory device, nonantalgic.    Musculoskeletal and Neuro -   Generalized tenderness to palpation at bilateral upper arms, forearms, hands, thighs, calves.  No tenderness to " palpation at bilateral upper back, thoracic spine, glutes, forehead, chest.    Key points for the international standards for neurological classification of spinal cord injury (ISNCSCI) to light touch.     Dermatome R L   C4 2 2   C5 2 2   C6 2 2   C7 2 2   C8 2 2   T1 2 2   T2 2 2       Motor Exam Upper Extremities   ? Myotome R L   Shoulder abduction C5 5 5   Elbow flexion C5 5 5   Wrist extension C6 5 5   Elbow extension C7 5 5   Finger flexion C8 5 5   Finger abduction T1 5 5       Thoracic/Lumbar Spine/Sacral Spine/Hips     Key points for the international standards for neurological classification of spinal cord injury (ISNCSCI) to light touch.   Dermatome R L   L2 2 2   L3 2 2   L4 2 2   L5 2 2   S1 2 2   S2 2 2       Motor Exam Lower Extremities  ? Myotome R L   Hip flexion L2 5 5   Knee extension L3 5 5   Ankle dorsiflexion L4 5 5   Toe extension L5 5 5   Ankle plantarflexion S1 5 5       MEDICAL DECISION MAKING    Medical records review: see under HPI section.     DATA    Labs: Personally reviewed at today's visit        Lab Results   Component Value Date/Time    SODIUM 136 12/11/2020 01:45 PM    POTASSIUM 4.1 12/11/2020 01:45 PM    CHLORIDE 103 12/11/2020 01:45 PM    CO2 22 12/11/2020 01:45 PM    ANION 11.0 12/11/2020 01:45 PM    GLUCOSE 96 12/11/2020 01:45 PM    BUN 11 12/11/2020 01:45 PM    CREATININE 0.76 12/11/2020 01:45 PM    CALCIUM 9.5 12/11/2020 01:45 PM    ASTSGOT 9 (L) 10/20/2020 12:36 PM    ALTSGPT 23 10/20/2020 12:36 PM    TBILIRUBIN <0.2 10/20/2020 12:36 PM    ALBUMIN 4.1 10/20/2020 12:36 PM    TOTPROTEIN 7.4 10/20/2020 12:36 PM    GLOBULIN 3.3 10/20/2020 12:36 PM    AGRATIO 1.2 10/20/2020 12:36 PM       No results found for: PROTHROMBTM, INR     Lab Results   Component Value Date/Time    WBC 6.3 05/24/2021 10:58 AM    RBC 4.51 05/24/2021 10:58 AM    HEMOGLOBIN 13.5 05/24/2021 10:58 AM    HEMATOCRIT 40.4 05/24/2021 10:58 AM    MCV 89.6 05/24/2021 10:58 AM    MCH 29.9 05/24/2021 10:58 AM     MCHC 33.4 (L) 2021 10:58 AM    MPV 10.0 2021 10:58 AM    NEUTSPOLYS 81.90 (H) 10/20/2020 12:36 PM    LYMPHOCYTES 12.10 (L) 10/20/2020 12:36 PM    MONOCYTES 2.60 10/20/2020 12:36 PM    EOSINOPHILS 0.90 10/20/2020 12:36 PM    BASOPHILS 0.00 10/20/2020 12:36 PM    HYPOCHROMIA 2+ (A) 10/20/2020 12:36 PM    ANISOCYTOSIS 1+ 10/20/2020 12:36 PM        No results found for: HBA1C     Imaging:   I personally reviewed following images, these are my reads  CXR 2020  Alignment of visualized aspect of thoracic and cervical spine intact.  No acute fracture noted.        IMAGING radiology reads. I reviewed the following radiology reads   CXR 2020  FINDINGS:  Heart size is within normal limits.  No pulmonary infiltrates or consolidations are noted.  No pleural abnormalities are noted.     IMPRESSION:     No acute cardiac or pulmonary abnormalities are identified.                           Diagnosis  Visit Diagnoses     ICD-10-CM   1. 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 At this time she does not appear to meet diagnostic criteria for fibromyalgia.     Suellen was seen today for new patient.    Diagnoses and all orders for this visit:    Central pain syndrome        PLAN  -Encourage light to moderate physical activity    Diagnostic workup: no further imaging needed at this time    Medications:   -Discussed that if her pain becomes intolerable or unmanageable in the future, she could discuss possible trial of Cymbalta, Effexor, or TCA with her PCP.  Given that she is able to self manage her pain with hot baths and a warm shower, at this time would likely defer any medications given their possible side effects.    Interventions: None needed at this time    Other  -Discussed that I would recommend a PCP referral for psychological counseling with someone who specializes in victims of  crime per patient request. Has not tried counseling in the past but feels ready to start.  Endorses stress with marital issues, caring for many young children, and previous emotional trauma.  Pain directly worsens with worsening stress and improves with improved stress.    Follow-up: As needed    Orders Placed This Encounter   • traMADol (ULTRAM) 50 MG Tab       Nia Burt MD  Interventional Pain and Spine  Physical Medicine and Rehabilitation  [facility]    CC PCP SAUL Boles  [facility]    The above note documents my personal evaluation of this patient. In addition, I have reviewed and confirmed with the patient and MA the supportive information documented in today's Patient Health Questionnaire and Office Note.     Total time: 36 minutes. I spent greater than 50% of the time for patient care and coordination on this date, including face-to-face time with the patient as per assessment and plan above.       Please note that this dictation was created using voice recognition software. I have made every reasonable attempt to correct obvious errors, but I expect that there are errors of grammar and possibly content that I did not discover before finalizing the note.

## 2022-04-04 ENCOUNTER — OFFICE VISIT (OUTPATIENT)
Dept: PHYSICAL MEDICINE AND REHAB | Facility: MEDICAL CENTER | Age: 42
End: 2022-04-04
Payer: MEDICAID

## 2022-04-04 VITALS
HEIGHT: 69 IN | TEMPERATURE: 97.6 F | WEIGHT: 244.27 LBS | SYSTOLIC BLOOD PRESSURE: 110 MMHG | OXYGEN SATURATION: 95 % | BODY MASS INDEX: 36.18 KG/M2 | HEART RATE: 71 BPM | DIASTOLIC BLOOD PRESSURE: 78 MMHG

## 2022-04-04 DIAGNOSIS — G89.0 CENTRAL PAIN SYNDROME: ICD-10-CM

## 2022-04-04 PROCEDURE — 99203 OFFICE O/P NEW LOW 30 MIN: CPT | Performed by: STUDENT IN AN ORGANIZED HEALTH CARE EDUCATION/TRAINING PROGRAM

## 2022-04-04 RX ORDER — TRAMADOL HYDROCHLORIDE 50 MG/1
50 TABLET ORAL EVERY 4 HOURS PRN
COMMUNITY
End: 2022-05-06

## 2022-04-04 ASSESSMENT — PAIN SCALES - GENERAL: PAINLEVEL: 6=MODERATE PAIN

## 2022-04-04 ASSESSMENT — PATIENT HEALTH QUESTIONNAIRE - PHQ9
5. POOR APPETITE OR OVEREATING: 1 - SEVERAL DAYS
SUM OF ALL RESPONSES TO PHQ QUESTIONS 1-9: 12
CLINICAL INTERPRETATION OF PHQ2 SCORE: 3

## 2022-04-04 ASSESSMENT — FIBROSIS 4 INDEX: FIB4 SCORE: 0.26

## 2022-04-04 NOTE — Clinical Note
Please fax copy of my note to Chao Montez PA-C at [facility]. Phone number on [facility] is  [phone].

## 2022-05-06 ENCOUNTER — OFFICE VISIT (OUTPATIENT)
Dept: URGENT CARE | Facility: PHYSICIAN GROUP | Age: 42
End: 2022-05-06
Payer: MEDICAID

## 2022-05-06 VITALS
WEIGHT: 243 LBS | RESPIRATION RATE: 16 BRPM | SYSTOLIC BLOOD PRESSURE: 126 MMHG | HEIGHT: 69 IN | BODY MASS INDEX: 35.99 KG/M2 | TEMPERATURE: 98.2 F | OXYGEN SATURATION: 95 % | HEART RATE: 72 BPM | DIASTOLIC BLOOD PRESSURE: 86 MMHG

## 2022-05-06 DIAGNOSIS — K04.7 DENTAL INFECTION: ICD-10-CM

## 2022-05-06 PROCEDURE — 99213 OFFICE O/P EST LOW 20 MIN: CPT | Performed by: FAMILY MEDICINE

## 2022-05-06 RX ORDER — AMOXICILLIN AND CLAVULANATE POTASSIUM 875; 125 MG/1; MG/1
TABLET, FILM COATED ORAL
Qty: 20 TABLET | Refills: 0 | Status: SHIPPED | OUTPATIENT
Start: 2022-05-06 | End: 2022-05-16

## 2022-05-06 ASSESSMENT — FIBROSIS 4 INDEX: FIB4 SCORE: 0.26

## 2022-05-06 NOTE — PROGRESS NOTES
Chief Complaint:    Chief Complaint   Patient presents with   • Dental Pain     R side of mouth, affecting ear and head, x1day        History of Present Illness:    Pain in right lower tooth starting yesterday. Ibuprofen has helped some temporarily. Augmentin has worked and been tolerated for previous dental issues.      Past Medical History:    Past Medical History:   Diagnosis Date   • Anemia     pt unsure what kind of anemia; received blood transfusion at [facility] on 8/1/2020   • Arthritis     joints, not defined between osteo vs rheumatoid   • Dental infection 05/22/2021   • Obesity (BMI 30-39.9) 9/10/2020     Past Surgical History:    Past Surgical History:   Procedure Laterality Date   • BREAST BIOPSY Right 5/26/2021    Procedure: BIOPSY, BREAST - EXCISION, PAINFUL MASS.;  Surgeon: Netta Blanca M.D.;  Location: SURGERY [facility];  Service: General   • BREAST BIOPSY Right 12/11/2020    Procedure: BIOPSY, BREAST- FOR EXPLORATION AND DEBRIDEMENT WOUND, EXCISION OF MAMMERY FISTULA;  Surgeon: Netta Blanca M.D.;  Location: SURGERY [facility];  Service: General   • INCISION AND DRAINAGE GENERAL Right 9/10/2020    Procedure: INCISION AND DRAINAGE;  Surgeon: Netta Blanca M.D.;  Location: SURGERY [facility];  Service: General   • BREAST BIOPSY Right 9/10/2020    Procedure: BIOPSY, BREAST;  Surgeon: Netta Blanca M.D.;  Location: SURGERY [facility];  Service: General   • HYSTERECTOMY LAPAROSCOPY  2020   • PRIMARY C SECTION      x2     Social History:    Social History     Socioeconomic History   • Marital status:      Spouse name: Not on file   • Number of children: Not on file   • Years of education: Not on file   • Highest education level: Not on file   Occupational History   • Not on file   Tobacco Use   • Smoking status: Never Smoker   • Smokeless tobacco: Never Used   Vaping Use   • Vaping Use: Never used   Substance and Sexual Activity   • Alcohol use: Never   •  "Drug use: Never   • Sexual activity: Not on file   Other Topics Concern   • Not on file   Social History Narrative   • Not on file     Social Determinants of Health     Financial Resource Strain: Not on file   Food Insecurity: Not on file   Transportation Needs: Not on file   Physical Activity: Not on file   Stress: Not on file   Social Connections: Not on file   Intimate Partner Violence: Not on file   Housing Stability: Not on file     Family History:    No family history on file.    Medications:    No current outpatient medications on file prior to visit.     No current facility-administered medications on file prior to visit.     Allergies:    No Known Allergies      Vitals:    Vitals:    22 1435   BP: 126/86   Pulse: 72   Resp: 16   Temp: 36.8 °C (98.2 °F)   TempSrc: Temporal   SpO2: 95%   Weight: 110 kg (243 lb)   Height: 1.753 m (5' 9\")       Physical Exam:    Constitutional: Vital signs reviewed. Appears well-developed and well-nourished. No acute distress.   Eyes: Sclera white, conjunctivae clear. PERRLA.  ENT: Tender right lower molar. External ears normal. Hearing normal. Lips are normal. Oral mucosa pink and moist.   Neck: Neck supple.   Pulmonary/Chest: Respirations non-labored.   Musculoskeletal: Normal gait. No muscular atrophy or weakness.  Neurological: Alert and oriented to person, place, and time. CN 2-12 intact. Muscle tone normal. Coordination normal.   Skin: No rashes or lesions. Warm, dry, normal turgor.  Psychiatric: Normal mood and affect. Behavior is normal. Judgment and thought content normal.       Medical Decision Makin. Dental infection  - amoxicillin-clavulanate (AUGMENTIN) 875-125 MG Tab; 1 TAB BY MOUTH TWICE A DAY X 10 DAYS. TAKE WITH FOOD.  Dispense: 20 Tablet; Refill: 0      Discussed with her DDX, management options, and risks, benefits, and alternatives to treatment plan agreed upon.    Pain in right lower tooth starting yesterday. Ibuprofen has helped some " temporarily. Augmentin has worked and been tolerated for previous dental issues.    Tender right lower molar.     Agreeable to medication prescribed.     May take over-the-counter Ibuprofen (Motrin or Advil) as needed for pain and swelling for anti-inflammatory effect.    May take over-the-counter Acetaminophen (Tylenol) as needed for pain.    Advised to see Dentist for definitive treatment.    Will return to urgent care if needed.

## 2022-05-17 ENCOUNTER — HOSPITAL ENCOUNTER (EMERGENCY)
Facility: MEDICAL CENTER | Age: 42
End: 2022-05-17
Attending: EMERGENCY MEDICINE
Payer: MEDICAID

## 2022-05-17 VITALS
WEIGHT: 246.03 LBS | OXYGEN SATURATION: 98 % | BODY MASS INDEX: 36.44 KG/M2 | TEMPERATURE: 97 F | DIASTOLIC BLOOD PRESSURE: 85 MMHG | SYSTOLIC BLOOD PRESSURE: 126 MMHG | RESPIRATION RATE: 17 BRPM | HEART RATE: 107 BPM | HEIGHT: 69 IN

## 2022-05-17 DIAGNOSIS — K04.7 DENTAL INFECTION: ICD-10-CM

## 2022-05-17 PROCEDURE — 99281 EMR DPT VST MAYX REQ PHY/QHP: CPT

## 2022-05-17 RX ORDER — PENICILLIN V POTASSIUM 500 MG/1
500 TABLET ORAL
Qty: 28 TABLET | Refills: 0 | Status: SHIPPED | OUTPATIENT
Start: 2022-05-17 | End: 2022-05-24

## 2022-05-17 ASSESSMENT — FIBROSIS 4 INDEX: FIB4 SCORE: 0.26

## 2022-05-17 NOTE — ED TRIAGE NOTES
"Chief Complaint   Patient presents with   • Tooth Abscess     Patient has an appointment with dentist on 5/23 to have tooth on R lower jaw removed. She just finished a course of antibiotics and has not had any relief.        Patient to triage ambulatory with a steady gait, AAOx4, Appropriate precautions in place.     Explained wait time and triage process. Placed back in lobby. Told to notify ED tech or RN of any changes, verbalized understanding.    /85   Pulse (!) 107   Temp 36.1 °C (97 °F) (Temporal)   Resp 17   Ht 1.753 m (5' 9\")   Wt 112 kg (246 lb 0.5 oz)   LMP 08/26/2020 (Approximate)   SpO2 98%   BMI 36.33 kg/m²     "

## 2022-05-17 NOTE — ED PROVIDER NOTES
ED Provider Note    CHIEF COMPLAINT  Chief Complaint   Patient presents with   • Tooth Abscess     Patient has an appointment with dentist on 5/23 to have tooth on R lower jaw removed. She just finished a course of antibiotics and has not had any relief.        HPI  Suellen Barbosa is a 41 y.o. female who presents complaining of toothache.  It hurts right lower molar.  She was given a prescription for Augmentin by her primary care but does not feel like it is working.  She had an x-ray there as well that looks like there is an abscess at the base of the tooth.  Pain radiates locally.  Rates pain as moderate to severe.  It is worse to chew or bite down.  However, patient is able to take orals.  No associated breathing/swallowing difficulty.  There is no fever, there is no facial swelling.   She is able to open her mouth fully..  She has an appointment for dentist on May 23    ROS:  Positive for dental pain, Negative for fever or swelling    PAST MEDICAL HISTORY   has a past medical history of Anemia, Arthritis, Dental infection (05/22/2021), and Obesity (BMI 30-39.9) (9/10/2020).    FAMILY HISTORY  History reviewed. No pertinent family history.    SOCIAL HISTORY  Social History     Tobacco Use   • Smoking status: Never Smoker   • Smokeless tobacco: Never Used   Vaping Use   • Vaping Use: Never used   Substance and Sexual Activity   • Alcohol use: Never   • Drug use: Never   • Sexual activity: Not on file       SURGICAL HISTORY   has a past surgical history that includes incision and drainage general (Right, 9/10/2020); breast biopsy (Right, 9/10/2020); primary c section; hysterectomy laparoscopy (2020); breast biopsy (Right, 12/11/2020); and breast biopsy (Right, 5/26/2021).    CURRENT MEDICATIONS  Reviewed, see encounter summary, include recent course of Augmentin    ALLERGIES  No Known Allergies    REVIEW OF SYSTEMS  See HPI for further details. Review of systems as above, otherwise all other systems are  "negative.     PHYSICAL EXAM  VITAL SIGNS: /85   Pulse (!) 107   Temp 36.1 °C (97 °F) (Temporal)   Resp 17   Ht 1.753 m (5' 9\")   Wt 112 kg (246 lb 0.5 oz)   LMP 08/26/2020 (Approximate)   SpO2 98%   BMI 36.33 kg/m²     Constitutional: Well appearing patient in mild distress from pain.  Not toxic, nor ill in appearance.  HENT: Mucus membranes moist.  Oropharynx is clear.    There is no facial edema.  There is tenderness over right lower molar.  Tongue is normal.  Floor of the mouth is normal.  Submental space is soft.  Posterior pharynx is normal.  Patient is tolerating secretions without difficulty.  Eyes: Pupils equally round.    Neck: Full nontender range of motion; no meningismus.   Lymphatic: No cervical lymphadenopathy noted.   Skin: No purpura nor petechia noted.  Extremities/Musculoskeletal: No sign of trauma.  Psychiatric: Normal affect appropriate for the clinical situation.    COURSE & MEDICAL DECISION MAKING  I have reviewed the recorded medical record information.    This patient presents with a toothache.  There is no obvious dental abscess at this time which I can drain.  I am prescribing the patient penicillin as she is requesting a different antibiotic, she already has an appointment for dental extraction on Monday, she has been counseled that it may get worse before getting better and to return for increasing pain or swelling, breathing/swallowing difficulty, fever, any other concern at all.  They are given aftercare instructions on dental absecess        FINAL IMPRESSION  1. Acute toothache  2. Dental abscess  Electronically signed by: Odalis Calzada M.D., 5/17/2022 4:02 PM      "

## 2022-08-17 NOTE — ED TRIAGE NOTES
"Chief Complaint   Patient presents with   • Numbness     Pt states she had bilat upper leg numbness that started 3 days ago and is getting progressively worse.     Pt BIB EMS for above complaint. Pt has been off her RA medications and was in the hospital last week and \"given steroids for inflammation.\" Pt denies any headaches or any other numbness. Pt states when she was trying to sleep she had shooting pain from her legs and up to her heart. EKG obtained. Pt placed in gown and educated on ER process.     /59   Pulse 68   Temp 36.1 °C (97 °F) (Oral)   Resp 16   Ht 1.753 m (5' 9\")   Wt 108.9 kg (240 lb)   LMP 08/26/2020 (Approximate)   SpO2 99%   BMI 35.44 kg/m²     " 16

## 2023-10-12 ENCOUNTER — APPOINTMENT (OUTPATIENT)
Dept: RADIOLOGY | Facility: MEDICAL CENTER | Age: 43
End: 2023-10-12
Attending: EMERGENCY MEDICINE
Payer: MEDICAID

## 2023-10-12 ENCOUNTER — HOSPITAL ENCOUNTER (EMERGENCY)
Facility: MEDICAL CENTER | Age: 43
End: 2023-10-12
Attending: EMERGENCY MEDICINE
Payer: MEDICAID

## 2023-10-12 VITALS
TEMPERATURE: 98.5 F | HEART RATE: 92 BPM | RESPIRATION RATE: 16 BRPM | WEIGHT: 251.1 LBS | SYSTOLIC BLOOD PRESSURE: 128 MMHG | DIASTOLIC BLOOD PRESSURE: 69 MMHG | OXYGEN SATURATION: 95 % | BODY MASS INDEX: 37.19 KG/M2 | HEIGHT: 69 IN

## 2023-10-12 DIAGNOSIS — N12 PYELONEPHRITIS: ICD-10-CM

## 2023-10-12 LAB
ALBUMIN SERPL BCP-MCNC: 3.9 G/DL (ref 3.2–4.9)
ALBUMIN/GLOB SERPL: 1.1 G/DL
ALP SERPL-CCNC: 101 U/L (ref 30–99)
ALT SERPL-CCNC: 17 U/L (ref 2–50)
ANION GAP SERPL CALC-SCNC: 11 MMOL/L (ref 7–16)
APPEARANCE UR: CLEAR
AST SERPL-CCNC: 13 U/L (ref 12–45)
BACTERIA #/AREA URNS HPF: ABNORMAL /HPF
BASOPHILS # BLD AUTO: 0.3 % (ref 0–1.8)
BASOPHILS # BLD: 0.04 K/UL (ref 0–0.12)
BILIRUB SERPL-MCNC: 0.5 MG/DL (ref 0.1–1.5)
BILIRUB UR QL STRIP.AUTO: NEGATIVE
BUN SERPL-MCNC: 11 MG/DL (ref 8–22)
CALCIUM ALBUM COR SERPL-MCNC: 9 MG/DL (ref 8.5–10.5)
CALCIUM SERPL-MCNC: 8.9 MG/DL (ref 8.5–10.5)
CHLORIDE SERPL-SCNC: 100 MMOL/L (ref 96–112)
CO2 SERPL-SCNC: 24 MMOL/L (ref 20–33)
COLOR UR: YELLOW
CREAT SERPL-MCNC: 0.86 MG/DL (ref 0.5–1.4)
EOSINOPHIL # BLD AUTO: 0.02 K/UL (ref 0–0.51)
EOSINOPHIL NFR BLD: 0.1 % (ref 0–6.9)
EPI CELLS #/AREA URNS HPF: ABNORMAL /HPF
ERYTHROCYTE [DISTWIDTH] IN BLOOD BY AUTOMATED COUNT: 43.7 FL (ref 35.9–50)
GFR SERPLBLD CREATININE-BSD FMLA CKD-EPI: 86 ML/MIN/1.73 M 2
GLOBULIN SER CALC-MCNC: 3.6 G/DL (ref 1.9–3.5)
GLUCOSE SERPL-MCNC: 94 MG/DL (ref 65–99)
GLUCOSE UR STRIP.AUTO-MCNC: NEGATIVE MG/DL
HCG SERPL QL: NEGATIVE
HCT VFR BLD AUTO: 44.8 % (ref 37–47)
HGB BLD-MCNC: 14.9 G/DL (ref 12–16)
HYALINE CASTS #/AREA URNS LPF: ABNORMAL /LPF
IMM GRANULOCYTES # BLD AUTO: 0.08 K/UL (ref 0–0.11)
IMM GRANULOCYTES NFR BLD AUTO: 0.6 % (ref 0–0.9)
KETONES UR STRIP.AUTO-MCNC: NEGATIVE MG/DL
LEUKOCYTE ESTERASE UR QL STRIP.AUTO: ABNORMAL
LIPASE SERPL-CCNC: 34 U/L (ref 11–82)
LYMPHOCYTES # BLD AUTO: 0.8 K/UL (ref 1–4.8)
LYMPHOCYTES NFR BLD: 5.6 % (ref 22–41)
MCH RBC QN AUTO: 30.4 PG (ref 27–33)
MCHC RBC AUTO-ENTMCNC: 33.3 G/DL (ref 32.2–35.5)
MCV RBC AUTO: 91.4 FL (ref 81.4–97.8)
MICRO URNS: ABNORMAL
MONOCYTES # BLD AUTO: 0.49 K/UL (ref 0–0.85)
MONOCYTES NFR BLD AUTO: 3.4 % (ref 0–13.4)
NEUTROPHILS # BLD AUTO: 12.95 K/UL (ref 1.82–7.42)
NEUTROPHILS NFR BLD: 90 % (ref 44–72)
NITRITE UR QL STRIP.AUTO: NEGATIVE
NRBC # BLD AUTO: 0 K/UL
NRBC BLD-RTO: 0 /100 WBC (ref 0–0.2)
PH UR STRIP.AUTO: 6.5 [PH] (ref 5–8)
PLATELET # BLD AUTO: 302 K/UL (ref 164–446)
PMV BLD AUTO: 8.8 FL (ref 9–12.9)
POTASSIUM SERPL-SCNC: 4 MMOL/L (ref 3.6–5.5)
PROT SERPL-MCNC: 7.5 G/DL (ref 6–8.2)
PROT UR QL STRIP: NEGATIVE MG/DL
RBC # BLD AUTO: 4.9 M/UL (ref 4.2–5.4)
RBC # URNS HPF: ABNORMAL /HPF
RBC UR QL AUTO: ABNORMAL
SODIUM SERPL-SCNC: 135 MMOL/L (ref 135–145)
SP GR UR STRIP.AUTO: 1.01
UROBILINOGEN UR STRIP.AUTO-MCNC: 0.2 MG/DL
WBC # BLD AUTO: 14.4 K/UL (ref 4.8–10.8)
WBC #/AREA URNS HPF: ABNORMAL /HPF

## 2023-10-12 PROCEDURE — 99284 EMERGENCY DEPT VISIT MOD MDM: CPT

## 2023-10-12 PROCEDURE — 700102 HCHG RX REV CODE 250 W/ 637 OVERRIDE(OP): Mod: UD | Performed by: EMERGENCY MEDICINE

## 2023-10-12 PROCEDURE — 83690 ASSAY OF LIPASE: CPT

## 2023-10-12 PROCEDURE — 74176 CT ABD & PELVIS W/O CONTRAST: CPT

## 2023-10-12 PROCEDURE — 84703 CHORIONIC GONADOTROPIN ASSAY: CPT

## 2023-10-12 PROCEDURE — 81001 URINALYSIS AUTO W/SCOPE: CPT

## 2023-10-12 PROCEDURE — 85025 COMPLETE CBC W/AUTO DIFF WBC: CPT

## 2023-10-12 PROCEDURE — 36415 COLL VENOUS BLD VENIPUNCTURE: CPT

## 2023-10-12 PROCEDURE — 80053 COMPREHEN METABOLIC PANEL: CPT

## 2023-10-12 PROCEDURE — A9270 NON-COVERED ITEM OR SERVICE: HCPCS | Mod: UD | Performed by: EMERGENCY MEDICINE

## 2023-10-12 RX ORDER — CEFTRIAXONE 2 G/1
2000 INJECTION, POWDER, FOR SOLUTION INTRAMUSCULAR; INTRAVENOUS ONCE
Status: DISCONTINUED | OUTPATIENT
Start: 2023-10-12 | End: 2023-10-12

## 2023-10-12 RX ORDER — ONDANSETRON 4 MG/1
4 TABLET, ORALLY DISINTEGRATING ORAL EVERY 6 HOURS PRN
Qty: 10 TABLET | Refills: 0 | Status: SHIPPED | OUTPATIENT
Start: 2023-10-12

## 2023-10-12 RX ORDER — SODIUM CHLORIDE, SODIUM LACTATE, POTASSIUM CHLORIDE, CALCIUM CHLORIDE 600; 310; 30; 20 MG/100ML; MG/100ML; MG/100ML; MG/100ML
1000 INJECTION, SOLUTION INTRAVENOUS ONCE
Status: DISCONTINUED | OUTPATIENT
Start: 2023-10-12 | End: 2023-10-12

## 2023-10-12 RX ORDER — ACETAMINOPHEN 325 MG/1
650 TABLET ORAL ONCE
Status: COMPLETED | OUTPATIENT
Start: 2023-10-12 | End: 2023-10-12

## 2023-10-12 RX ORDER — SULFAMETHOXAZOLE AND TRIMETHOPRIM 800; 160 MG/1; MG/1
1 TABLET ORAL ONCE
Status: COMPLETED | OUTPATIENT
Start: 2023-10-12 | End: 2023-10-12

## 2023-10-12 RX ORDER — SULFAMETHOXAZOLE AND TRIMETHOPRIM 800; 160 MG/1; MG/1
1 TABLET ORAL 2 TIMES DAILY
Qty: 20 TABLET | Refills: 0 | Status: ACTIVE | OUTPATIENT
Start: 2023-10-12 | End: 2023-10-18

## 2023-10-12 RX ADMIN — ACETAMINOPHEN 650 MG: 325 TABLET, FILM COATED ORAL at 11:22

## 2023-10-12 RX ADMIN — SULFAMETHOXAZOLE AND TRIMETHOPRIM 1 TABLET: 800; 160 TABLET ORAL at 11:22

## 2023-10-12 NOTE — ED PROVIDER NOTES
"ED Provider Note    CHIEF COMPLAINT  Chief Complaint   Patient presents with    Flank Pain     Pt seen at [facility] in [facility] for the  cold like symptoms that she has today.  Pt  reports \"bad cold\" that has been going on for the last 8 days.  But  Today woke up with mid to lower back on the right side.  Blood and covid test completed at [facility]  and was  negative        EXTERNAL RECORDS REVIEWED  Inpatient Notes remote ER visit May 2022 for dental infection    HPI/ROS  LIMITATION TO HISTORY   Select: : None  OUTSIDE HISTORIAN(S):  None    Suellen Barbosa is a 43 y.o. female who presents to the ER with persistent sharp right flank pain.  Past medical history as document below.  Patient was seen at the [facility] ER a few days ago.  At that time she presented she was primarily complaining of cough cold congestion.  She had a viral panel completed and a chest x-ray which was reportedly negative.  She did explain that she had the flank pain although she denies any knowledge of work-up for this now she continues with polyuria polydipsia, urinary urgency and persistent flank pain which again is sharp in nature with moderate to severe waxing and waning symptoms.  No prior history of kidney stones.  Denies pregnancy.    PAST MEDICAL HISTORY   has a past medical history of Anemia, Arthritis, Dental infection (05/22/2021), and Obesity (BMI 30-39.9) (9/10/2020).    SURGICAL HISTORY   has a past surgical history that includes incision and drainage general (Right, 9/10/2020); breast biopsy (Right, 9/10/2020); primary c section; hysterectomy laparoscopy (2020); breast biopsy (Right, 12/11/2020); and breast biopsy (Right, 5/26/2021).    FAMILY HISTORY  History reviewed. No pertinent family history.    SOCIAL HISTORY  Social History     Tobacco Use    Smoking status: Never    Smokeless tobacco: Never   Vaping Use    Vaping Use: Never used   Substance and Sexual Activity    Alcohol use: Never    Drug use: " "Never    Sexual activity: Not on file       CURRENT MEDICATIONS  Home Medications       Reviewed by Dee Soto R.N. (Registered Nurse) on 10/12/23 at 0933  Med List Status: Complete     Medication Last Dose Status        Patient Colt Taking any Medications                           ALLERGIES  No Known Allergies    PHYSICAL EXAM  VITAL SIGNS: /69   Pulse 92   Temp 36.9 °C (98.5 °F) (Temporal)   Resp 16   Ht 1.753 m (5' 9\")   Wt 114 kg (251 lb 1.7 oz)   LMP 08/26/2020 (Approximate)   SpO2 95%   BMI 37.08 kg/m²      Pulse ox interpretation: I interpret this pulse ox as normal.  Constitutional: Alert in no apparent distress.  Cloudy urine at bedside  HENT: No signs of trauma, Bilateral external ears normal, Nose normal.   Eyes: Pupils are equal and reactive  Neck: Normal range of motion, No tenderness, Supple  Cardiovascular: Regular rate and rhythm, no murmurs.   Thorax & Lungs: Normal breath sounds, No respiratory distress, No wheezing, No chest tenderness.   Abdomen: Bowel sounds normal, Soft, No tenderness  Skin: Warm, Dry, No erythema, No rash.   Back: No bony tenderness, slight right CVA tenderness  Musculoskeletal: Good range of motion in all major joints. No tenderness to palpation or major deformities noted.   Neurologic: Alert , Normal motor function, Normal sensory function, No focal deficits noted.   Psychiatric: Affect normal, Judgment normal, Mood normal.         DIAGNOSTIC STUDIES / PROCEDURES  LABS  Results for orders placed or performed during the hospital encounter of 10/12/23   CBC with Differential   Result Value Ref Range    WBC 14.4 (H) 4.8 - 10.8 K/uL    RBC 4.90 4.20 - 5.40 M/uL    Hemoglobin 14.9 12.0 - 16.0 g/dL    Hematocrit 44.8 37.0 - 47.0 %    MCV 91.4 81.4 - 97.8 fL    MCH 30.4 27.0 - 33.0 pg    MCHC 33.3 32.2 - 35.5 g/dL    RDW 43.7 35.9 - 50.0 fL    Platelet Count 302 164 - 446 K/uL    MPV 8.8 (L) 9.0 - 12.9 fL    Neutrophils-Polys 90.00 (H) 44.00 - 72.00 %    " Lymphocytes 5.60 (L) 22.00 - 41.00 %    Monocytes 3.40 0.00 - 13.40 %    Eosinophils 0.10 0.00 - 6.90 %    Basophils 0.30 0.00 - 1.80 %    Immature Granulocytes 0.60 0.00 - 0.90 %    Nucleated RBC 0.00 0.00 - 0.20 /100 WBC    Neutrophils (Absolute) 12.95 (H) 1.82 - 7.42 K/uL    Lymphs (Absolute) 0.80 (L) 1.00 - 4.80 K/uL    Monos (Absolute) 0.49 0.00 - 0.85 K/uL    Eos (Absolute) 0.02 0.00 - 0.51 K/uL    Baso (Absolute) 0.04 0.00 - 0.12 K/uL    Immature Granulocytes (abs) 0.08 0.00 - 0.11 K/uL    NRBC (Absolute) 0.00 K/uL   Complete Metabolic Panel   Result Value Ref Range    Sodium 135 135 - 145 mmol/L    Potassium 4.0 3.6 - 5.5 mmol/L    Chloride 100 96 - 112 mmol/L    Co2 24 20 - 33 mmol/L    Anion Gap 11.0 7.0 - 16.0    Glucose 94 65 - 99 mg/dL    Bun 11 8 - 22 mg/dL    Creatinine 0.86 0.50 - 1.40 mg/dL    Calcium 8.9 8.5 - 10.5 mg/dL    Correct Calcium 9.0 8.5 - 10.5 mg/dL    AST(SGOT) 13 12 - 45 U/L    ALT(SGPT) 17 2 - 50 U/L    Alkaline Phosphatase 101 (H) 30 - 99 U/L    Total Bilirubin 0.5 0.1 - 1.5 mg/dL    Albumin 3.9 3.2 - 4.9 g/dL    Total Protein 7.5 6.0 - 8.2 g/dL    Globulin 3.6 (H) 1.9 - 3.5 g/dL    A-G Ratio 1.1 g/dL   Lipase   Result Value Ref Range    Lipase 34 11 - 82 U/L   Urinalysis    Specimen: Urine   Result Value Ref Range    Color Yellow     Character Clear     Specific Gravity 1.010 <1.035    Ph 6.5 5.0 - 8.0    Glucose Negative Negative mg/dL    Ketones Negative Negative mg/dL    Protein Negative Negative mg/dL    Bilirubin Negative Negative    Urobilinogen, Urine 0.2 Negative    Nitrite Negative Negative    Leukocyte Esterase Moderate (A) Negative    Occult Blood Small (A) Negative    Micro Urine Req Microscopic    BETA-HCG QUALITATIVE SERUM   Result Value Ref Range    Beta-Hcg Qualitative Serum Negative Negative   URINE MICROSCOPIC (W/UA)   Result Value Ref Range    WBC  (A) /hpf    RBC 2-5 (A) /hpf    Bacteria Many (A) None /hpf    Epithelial Cells Few /hpf    Hyaline Cast 0-2  /lpf   ESTIMATED GFR   Result Value Ref Range    GFR (CKD-EPI) 86 >60 mL/min/1.73 m 2         RADIOLOGY  I have independently interpreted the diagnostic imaging associated with this visit and am waiting the final reading from the radiologist.   My preliminary interpretation is as follows: No obvious kidney stone but does appear to be some inflammatory changes to the right kidney and ureter with possible moderate hydro  Radiologist interpretation:   CT-RENAL COLIC EVALUATION(A/P W/O)   Final Result         1.  There is apparent right-sided uroepithelial thickening and mild right renal pelviectasis raising suspicion for an ascending infection.   2.  Atrophic left kidney.                     COURSE & MEDICAL DECISION MAKING    ED Observation Status? No; Patient does not meet criteria for ED Observation.     INITIAL ASSESSMENT, COURSE AND PLAN  Care Narrative: Patient presenting to the emergency department with the above presentation.  We will repeat infectious and stone work-up given her current pathology.  Will not repeat viral panel as this was already done  DISPOSITION AND DISCUSSIONS  I have discussed management of the patient with the following physicians and PAOLA's: None    Discussion of management with other QHP or appropriate source(s): Pharmacy for medication verification      Escalation of care considered, and ultimately not performed:acute inpatient care management, however at this time, the patient is most appropriate for outpatient management    Barriers to care at this time, including but not limited to: Patient does not have established PCP.     Decision tools and prescription drugs considered including, but not limited to: Antibiotics will be empirically started .    43-year-old female presenting the emergency apartment for persistent flank pain.  History as above.  Urinalysis grossly positive for urine infection.  Additional CT findings for correlative pyelonephritis with inflammatory changes noted.  No  stone noted.  Patient is overall systemically stable.  Will not require inpatient care.  She will be started on Bactrim for 10 days to treat the pyelonephritis.  She will be provided with additional Zofran for antiemetic needs.  She will use over-the-counter medications to include Tylenol and NSAIDs for pain and inflammatory control.  She will return to the ER with any change or worsening.      FINAL DIAGNOSIS  1. Pyelonephritis           Electronically signed by: Zanedr Gomez M.D., 10/12/2023 10:02 AM

## 2023-10-12 NOTE — ED NOTES
PT DISCHARGE TO HOME. PT AMBULATORY WITH A STEADY GAIT. PT GIVEN D/C INSTRUCTIONS AND PT VERBALIZED AN UNDERSTANDING.     PT IS WAITING FOR RIDE HOME.

## 2023-10-12 NOTE — ED TRIAGE NOTES
"Pt  to triage with   Chief Complaint   Patient presents with    Flank Pain     Pt seen at [facility] in [place] for the  cold like symptoms that she has today.  Pt  reports \"bad cold\" that has been going on for the last 8 days.  But  Today woke up with mid to lower back on the right side.  Blood and covid test completed at [facility]  and was  negative      Pt rec'd 30 mg of todadol IM by EMS PTA, pain intially 9/10 now 3/10.  Protocol ordered.  Pt Informed regarding triage process and verbalized understanding to inform triage tech or RN for any changes in condition. Placed in lobby.     "

## 2023-10-12 NOTE — ED NOTES
PT AMBULATORY TO ROOM. PATIENT STATES FLU LIKE SYMPTOMS FOR 8 DAYS WITH NEGATIVE FLU/COVID TESTS. PT STATES RIGHT FLANK PAIN THIS AM.     PT IS RESTING IN BED. BREATHING IS EVEN AND UNLABORED, SKIN IS WARM AND DRY, VSS, NAD, WILL CONTINUE TO MONITOR. PENDING MD EVALUATION.

## 2023-10-17 ENCOUNTER — HOSPITAL ENCOUNTER (EMERGENCY)
Facility: MEDICAL CENTER | Age: 43
End: 2023-10-18
Attending: STUDENT IN AN ORGANIZED HEALTH CARE EDUCATION/TRAINING PROGRAM
Payer: MEDICAID

## 2023-10-17 DIAGNOSIS — N12 PYELONEPHRITIS: ICD-10-CM

## 2023-10-17 LAB
ALBUMIN SERPL BCP-MCNC: 3.6 G/DL (ref 3.2–4.9)
ALBUMIN/GLOB SERPL: 1.1 G/DL
ALP SERPL-CCNC: 94 U/L (ref 30–99)
ALT SERPL-CCNC: 27 U/L (ref 2–50)
ANION GAP SERPL CALC-SCNC: 11 MMOL/L (ref 7–16)
APPEARANCE UR: CLEAR
AST SERPL-CCNC: 17 U/L (ref 12–45)
BASOPHILS # BLD AUTO: 0.3 % (ref 0–1.8)
BASOPHILS # BLD: 0.02 K/UL (ref 0–0.12)
BILIRUB SERPL-MCNC: 0.2 MG/DL (ref 0.1–1.5)
BILIRUB UR QL STRIP.AUTO: NEGATIVE
BUN SERPL-MCNC: 11 MG/DL (ref 8–22)
CALCIUM ALBUM COR SERPL-MCNC: 9.4 MG/DL (ref 8.5–10.5)
CALCIUM SERPL-MCNC: 9.1 MG/DL (ref 8.5–10.5)
CHLORIDE SERPL-SCNC: 100 MMOL/L (ref 96–112)
CO2 SERPL-SCNC: 26 MMOL/L (ref 20–33)
COLOR UR: YELLOW
CREAT SERPL-MCNC: 0.99 MG/DL (ref 0.5–1.4)
EOSINOPHIL # BLD AUTO: 0.11 K/UL (ref 0–0.51)
EOSINOPHIL NFR BLD: 1.5 % (ref 0–6.9)
ERYTHROCYTE [DISTWIDTH] IN BLOOD BY AUTOMATED COUNT: 41.5 FL (ref 35.9–50)
GFR SERPLBLD CREATININE-BSD FMLA CKD-EPI: 72 ML/MIN/1.73 M 2
GLOBULIN SER CALC-MCNC: 3.4 G/DL (ref 1.9–3.5)
GLUCOSE SERPL-MCNC: 103 MG/DL (ref 65–99)
GLUCOSE UR STRIP.AUTO-MCNC: NEGATIVE MG/DL
HCT VFR BLD AUTO: 37.7 % (ref 37–47)
HGB BLD-MCNC: 12.7 G/DL (ref 12–16)
IMM GRANULOCYTES # BLD AUTO: 0.08 K/UL (ref 0–0.11)
IMM GRANULOCYTES NFR BLD AUTO: 1.1 % (ref 0–0.9)
KETONES UR STRIP.AUTO-MCNC: NEGATIVE MG/DL
LEUKOCYTE ESTERASE UR QL STRIP.AUTO: NEGATIVE
LIPASE SERPL-CCNC: 56 U/L (ref 11–82)
LYMPHOCYTES # BLD AUTO: 2.21 K/UL (ref 1–4.8)
LYMPHOCYTES NFR BLD: 31.1 % (ref 22–41)
MCH RBC QN AUTO: 30.8 PG (ref 27–33)
MCHC RBC AUTO-ENTMCNC: 33.7 G/DL (ref 32.2–35.5)
MCV RBC AUTO: 91.3 FL (ref 81.4–97.8)
MICRO URNS: NORMAL
MONOCYTES # BLD AUTO: 0.53 K/UL (ref 0–0.85)
MONOCYTES NFR BLD AUTO: 7.5 % (ref 0–13.4)
NEUTROPHILS # BLD AUTO: 4.16 K/UL (ref 1.82–7.42)
NEUTROPHILS NFR BLD: 58.5 % (ref 44–72)
NITRITE UR QL STRIP.AUTO: NEGATIVE
NRBC # BLD AUTO: 0 K/UL
NRBC BLD-RTO: 0 /100 WBC (ref 0–0.2)
PH UR STRIP.AUTO: 7 [PH] (ref 5–8)
PLATELET # BLD AUTO: 335 K/UL (ref 164–446)
PMV BLD AUTO: 9.1 FL (ref 9–12.9)
POTASSIUM SERPL-SCNC: 4.2 MMOL/L (ref 3.6–5.5)
PROT SERPL-MCNC: 7 G/DL (ref 6–8.2)
PROT UR QL STRIP: NEGATIVE MG/DL
RBC # BLD AUTO: 4.13 M/UL (ref 4.2–5.4)
RBC UR QL AUTO: NEGATIVE
SODIUM SERPL-SCNC: 137 MMOL/L (ref 135–145)
SP GR UR STRIP.AUTO: 1.01
UROBILINOGEN UR STRIP.AUTO-MCNC: 1 MG/DL
WBC # BLD AUTO: 7.1 K/UL (ref 4.8–10.8)

## 2023-10-17 PROCEDURE — 81003 URINALYSIS AUTO W/O SCOPE: CPT

## 2023-10-17 PROCEDURE — 83690 ASSAY OF LIPASE: CPT

## 2023-10-17 PROCEDURE — 85025 COMPLETE CBC W/AUTO DIFF WBC: CPT

## 2023-10-17 PROCEDURE — 36415 COLL VENOUS BLD VENIPUNCTURE: CPT

## 2023-10-17 PROCEDURE — 99283 EMERGENCY DEPT VISIT LOW MDM: CPT

## 2023-10-17 PROCEDURE — 80053 COMPREHEN METABOLIC PANEL: CPT

## 2023-10-17 ASSESSMENT — PAIN DESCRIPTION - PAIN TYPE: TYPE: ACUTE PAIN

## 2023-10-17 ASSESSMENT — FIBROSIS 4 INDEX: FIB4 SCORE: 0.45

## 2023-10-18 VITALS
BODY MASS INDEX: 37.32 KG/M2 | HEART RATE: 84 BPM | RESPIRATION RATE: 16 BRPM | OXYGEN SATURATION: 98 % | WEIGHT: 251.99 LBS | DIASTOLIC BLOOD PRESSURE: 74 MMHG | TEMPERATURE: 97.7 F | SYSTOLIC BLOOD PRESSURE: 129 MMHG | HEIGHT: 69 IN

## 2023-10-18 RX ORDER — CEPHALEXIN 500 MG/1
500 CAPSULE ORAL 2 TIMES DAILY
Qty: 6 CAPSULE | Refills: 0 | Status: ACTIVE | OUTPATIENT
Start: 2023-10-18 | End: 2023-10-21

## 2023-10-18 RX ORDER — HYDROCODONE BITARTRATE AND ACETAMINOPHEN 5; 325 MG/1; MG/1
1 TABLET ORAL EVERY 4 HOURS PRN
Qty: 10 TABLET | Refills: 0 | Status: SHIPPED | OUTPATIENT
Start: 2023-10-18 | End: 2023-10-21

## 2023-10-18 NOTE — ED NOTES
Pt ambulated to [address] with a steady gait. C/C is Flank Pain. Pt is on the monitor and call light within reach. Chart up for ERP.

## 2023-10-18 NOTE — ED TRIAGE NOTES
"Suellen Barbosa  43 y.o.  female  Chief Complaint   Patient presents with    Flank Pain     Dx with pyelonephritis in ED 5 days ago. C/o persistent R sided flank pain since. Pt reports chills & \"flu-like\" symptoms - no measured temp at home. Started on bactrim, has been taking it despite having nausea.      Patient educated on triage process, to alert staff if any changes in condition.    Labs & UA ordered.   "

## 2023-10-18 NOTE — ED PROVIDER NOTES
"ED Provider Note    CHIEF COMPLAINT  Chief Complaint   Patient presents with    Flank Pain     Dx with pyelonephritis in ED 5 days ago. C/o persistent R sided flank pain since. Pt reports chills & \"flu-like\" symptoms - no measured temp at home. Started on bactrim, has been taking it despite having nausea.        EXTERNAL RECORDS REVIEWED  Seen at this ED 1 week ago and diagnosed with pyelonephritis no nephrolithiasis on CT at that time    HPI/ROS  LIMITATION TO HISTORY   Select: : None  OUTSIDE HISTORIAN(S):      Suellen Barbosa is a 43 y.o. female who presents with persistent right flank pain and intermittent symptoms of feeling unwell.  Patient reports she feels that she is having an adverse reaction to the Bactrim she was prescribed for her pyelonephritis after she takes the medication she reports feeling nauseous.  She denies any recorded fevers at home over the past few days.  Denies dysuria.    PAST MEDICAL HISTORY   has a past medical history of Anemia, Arthritis, Dental infection (05/22/2021), and Obesity (BMI 30-39.9) (9/10/2020).    SURGICAL HISTORY   has a past surgical history that includes incision and drainage general (Right, 9/10/2020); breast biopsy (Right, 9/10/2020); primary c section; hysterectomy laparoscopy (2020); breast biopsy (Right, 12/11/2020); and breast biopsy (Right, 5/26/2021).    FAMILY HISTORY  No family history on file.    SOCIAL HISTORY  Social History     Tobacco Use    Smoking status: Never    Smokeless tobacco: Never   Vaping Use    Vaping Use: Never used   Substance and Sexual Activity    Alcohol use: Never    Drug use: Never    Sexual activity: Not on file       CURRENT MEDICATIONS  Home Medications       Reviewed by Alyce Sue R.N. (Registered Nurse) on 10/17/23 at 2213  Med List Status: Not Addressed     Medication Last Dose Status   ondansetron (ZOFRAN ODT) 4 MG TABLET DISPERSIBLE  Active   sulfamethoxazole-trimethoprim (BACTRIM DS) 800-160 MG tablet  Active         " "           ALLERGIES  No Known Allergies    PHYSICAL EXAM  VITAL SIGNS: /74   Pulse 84   Temp 36.5 °C (97.7 °F) (Temporal)   Resp 16   Ht 1.753 m (5' 9\")   Wt 114 kg (251 lb 15.8 oz)   LMP 08/26/2020 (Approximate)   SpO2 98%   BMI 37.21 kg/m²    Physical Exam  Vitals and nursing note reviewed.   Constitutional:       Appearance: She is well-developed.   HENT:      Head: Normocephalic.   Eyes:      Extraocular Movements: Extraocular movements intact.      Pupils: Pupils are equal, round, and reactive to light.   Cardiovascular:      Rate and Rhythm: Normal rate and regular rhythm.   Pulmonary:      Effort: Pulmonary effort is normal.      Breath sounds: Normal breath sounds.   Abdominal:      Palpations: Abdomen is soft.      Tenderness: There is no abdominal tenderness. There is right CVA tenderness and left CVA tenderness.   Musculoskeletal:      Cervical back: Normal range of motion.   Neurological:      Mental Status: She is alert and oriented to person, place, and time.           DIAGNOSTIC STUDIES / PROCEDURES      LABS  Labs Reviewed   CBC WITH DIFFERENTIAL - Abnormal; Notable for the following components:       Result Value    RBC 4.13 (*)     Immature Granulocytes 1.10 (*)     All other components within normal limits   COMP METABOLIC PANEL - Abnormal; Notable for the following components:    Glucose 103 (*)     All other components within normal limits   LIPASE   URINALYSIS,CULTURE IF INDICATED    Narrative:     Indication for culture:->Patient WITHOUT an indwelling Garcia  catheter in place with new onset of Dysuria, Frequency,  Urgency, and/or Suprapubic pain   ESTIMATED GFR         COURSE & MEDICAL DECISION MAKING    ED Observation Status? No; Patient does not meet criteria for ED Observation.     INITIAL ASSESSMENT, COURSE AND PLAN  Care Narrative: 43-year-old female presents to the ED for persistent right-sided flank pain and nonspecific symptoms after recently being diagnosed with " pyelonephritis here 1 week ago.  On exam she is well-appearing with normal vital signs there is no abdominal or CVA tenderness present and she appears comfortable.  I obtained lab work-up to rule out LEOLA in the setting of a possible obstructive process from scarring versus Bactrim adverse effect in the patient's CMP was within normal limits.  A CBC was obtained which showed no leukocytosis which is reassuring as the patient did have a leukocytosis when she was here 1 week ago with pyelonephritis.  The patient also did not have any vital sign derangements or fever to indicate an active infection.  Her urinalysis was also negative indicative that she had cleared the infection of pyelonephritis I considered obtaining a repeat CT scan to rule out condition such as perinephric abscess as the patient is still having pain however did not feel this would be beneficial as the patient had no other clinical indications of infection.  I suspect her pain is from postinflammatory changes and healing from her recent pyelonephritis and infection.  I discussed with her managing her pain with NSAIDs and Tylenol with Norco for breakthrough.  I feel that a much of her symptoms could be an adverse reaction to the Bactrim so I switched her to Keflex to finish out a 10-day course of antibiotics.  I advised her to follow-up with a PCP or return to the ED if she is worsening.        ADDITIONAL PROBLEM LIST  Past Medical History:   Diagnosis Date    Anemia     pt unsure what kind of anemia; received blood transfusion at [facility] on 8/1/2020    Arthritis     joints, not defined between osteo vs rheumatoid    Dental infection 05/22/2021    Obesity (BMI 30-39.9) 9/10/2020       DISPOSITION AND DISCUSSIONS  I have discussed management of the patient with the following physicians and PAOLA's:      Discussion of management with other QHP or appropriate source(s): None     Escalation of care considered, and ultimately not performed:diagnostic  imaging    Barriers to care at this time, including but not limited to: Patient does not have established PCP.     Decision tools and prescription drugs considered including, but not limited to: Pain Medications NSAIDs, Tylenol, opiates .    FINAL DIAGNOSIS  1. Pyelonephritis           Electronically signed by: Valentino Grover M.D., 10/18/2023 1:37 AM

## 2024-04-05 ENCOUNTER — HOSPITAL ENCOUNTER (EMERGENCY)
Facility: MEDICAL CENTER | Age: 44
End: 2024-04-06
Attending: EMERGENCY MEDICINE
Payer: MEDICAID

## 2024-04-05 ENCOUNTER — APPOINTMENT (OUTPATIENT)
Dept: RADIOLOGY | Facility: MEDICAL CENTER | Age: 44
End: 2024-04-05
Attending: EMERGENCY MEDICINE
Payer: MEDICAID

## 2024-04-05 DIAGNOSIS — R06.02 SHORTNESS OF BREATH: ICD-10-CM

## 2024-04-05 DIAGNOSIS — R05.1 ACUTE COUGH: ICD-10-CM

## 2024-04-05 DIAGNOSIS — R42 DIZZINESS: ICD-10-CM

## 2024-04-05 LAB
ALBUMIN SERPL BCP-MCNC: 3.8 G/DL (ref 3.2–4.9)
ALBUMIN/GLOB SERPL: 1.2 G/DL
ALP SERPL-CCNC: 86 U/L (ref 30–99)
ALT SERPL-CCNC: 22 U/L (ref 2–50)
ANION GAP SERPL CALC-SCNC: 13 MMOL/L (ref 7–16)
APPEARANCE UR: CLEAR
AST SERPL-CCNC: 16 U/L (ref 12–45)
BASOPHILS # BLD AUTO: 0.3 % (ref 0–1.8)
BASOPHILS # BLD: 0.02 K/UL (ref 0–0.12)
BILIRUB SERPL-MCNC: <0.2 MG/DL (ref 0.1–1.5)
BILIRUB UR QL STRIP.AUTO: NEGATIVE
BUN SERPL-MCNC: 16 MG/DL (ref 8–22)
CALCIUM ALBUM COR SERPL-MCNC: 9.4 MG/DL (ref 8.5–10.5)
CALCIUM SERPL-MCNC: 9.2 MG/DL (ref 8.5–10.5)
CHLORIDE SERPL-SCNC: 106 MMOL/L (ref 96–112)
CO2 SERPL-SCNC: 20 MMOL/L (ref 20–33)
COLOR UR: YELLOW
CREAT SERPL-MCNC: 0.84 MG/DL (ref 0.5–1.4)
EKG IMPRESSION: NORMAL
EOSINOPHIL # BLD AUTO: 0.14 K/UL (ref 0–0.51)
EOSINOPHIL NFR BLD: 1.9 % (ref 0–6.9)
ERYTHROCYTE [DISTWIDTH] IN BLOOD BY AUTOMATED COUNT: 40.8 FL (ref 35.9–50)
FLUAV RNA SPEC QL NAA+PROBE: NEGATIVE
FLUBV RNA SPEC QL NAA+PROBE: NEGATIVE
GFR SERPLBLD CREATININE-BSD FMLA CKD-EPI: 88 ML/MIN/1.73 M 2
GLOBULIN SER CALC-MCNC: 3.1 G/DL (ref 1.9–3.5)
GLUCOSE SERPL-MCNC: 130 MG/DL (ref 65–99)
GLUCOSE UR STRIP.AUTO-MCNC: NEGATIVE MG/DL
HCT VFR BLD AUTO: 36.6 % (ref 37–47)
HGB BLD-MCNC: 13.2 G/DL (ref 12–16)
IMM GRANULOCYTES # BLD AUTO: 0.06 K/UL (ref 0–0.11)
IMM GRANULOCYTES NFR BLD AUTO: 0.8 % (ref 0–0.9)
KETONES UR STRIP.AUTO-MCNC: NEGATIVE MG/DL
LEUKOCYTE ESTERASE UR QL STRIP.AUTO: NEGATIVE
LYMPHOCYTES # BLD AUTO: 2.15 K/UL (ref 1–4.8)
LYMPHOCYTES NFR BLD: 29.2 % (ref 22–41)
MCH RBC QN AUTO: 31.5 PG (ref 27–33)
MCHC RBC AUTO-ENTMCNC: 36.1 G/DL (ref 32.2–35.5)
MCV RBC AUTO: 87.4 FL (ref 81.4–97.8)
MICRO URNS: NORMAL
MONOCYTES # BLD AUTO: 0.55 K/UL (ref 0–0.85)
MONOCYTES NFR BLD AUTO: 7.5 % (ref 0–13.4)
NEUTROPHILS # BLD AUTO: 4.45 K/UL (ref 1.82–7.42)
NEUTROPHILS NFR BLD: 60.3 % (ref 44–72)
NITRITE UR QL STRIP.AUTO: NEGATIVE
NRBC # BLD AUTO: 0 K/UL
NRBC BLD-RTO: 0 /100 WBC (ref 0–0.2)
PH UR STRIP.AUTO: 5.5 [PH] (ref 5–8)
PLATELET # BLD AUTO: 297 K/UL (ref 164–446)
PMV BLD AUTO: 9.2 FL (ref 9–12.9)
POTASSIUM SERPL-SCNC: 3.5 MMOL/L (ref 3.6–5.5)
PROT SERPL-MCNC: 6.9 G/DL (ref 6–8.2)
PROT UR QL STRIP: NEGATIVE MG/DL
RBC # BLD AUTO: 4.19 M/UL (ref 4.2–5.4)
RBC UR QL AUTO: NEGATIVE
RSV RNA SPEC QL NAA+PROBE: NEGATIVE
SARS-COV-2 RNA RESP QL NAA+PROBE: NOTDETECTED
SODIUM SERPL-SCNC: 139 MMOL/L (ref 135–145)
SP GR UR STRIP.AUTO: 1.02
TROPONIN T SERPL-MCNC: <6 NG/L (ref 6–19)
UROBILINOGEN UR STRIP.AUTO-MCNC: 0.2 MG/DL
WBC # BLD AUTO: 7.4 K/UL (ref 4.8–10.8)

## 2024-04-05 PROCEDURE — 36415 COLL VENOUS BLD VENIPUNCTURE: CPT

## 2024-04-05 PROCEDURE — 84484 ASSAY OF TROPONIN QUANT: CPT

## 2024-04-05 PROCEDURE — 99284 EMERGENCY DEPT VISIT MOD MDM: CPT

## 2024-04-05 PROCEDURE — 80053 COMPREHEN METABOLIC PANEL: CPT

## 2024-04-05 PROCEDURE — 85025 COMPLETE CBC W/AUTO DIFF WBC: CPT

## 2024-04-05 PROCEDURE — 93005 ELECTROCARDIOGRAM TRACING: CPT | Performed by: EMERGENCY MEDICINE

## 2024-04-05 PROCEDURE — 81003 URINALYSIS AUTO W/O SCOPE: CPT

## 2024-04-05 PROCEDURE — 71045 X-RAY EXAM CHEST 1 VIEW: CPT

## 2024-04-05 PROCEDURE — 0241U HCHG SARS-COV-2 COVID-19 NFCT DS RESP RNA 4 TRGT ED POC: CPT

## 2024-04-05 ASSESSMENT — FIBROSIS 4 INDEX: FIB4 SCORE: 0.42

## 2024-04-06 VITALS
SYSTOLIC BLOOD PRESSURE: 98 MMHG | RESPIRATION RATE: 16 BRPM | DIASTOLIC BLOOD PRESSURE: 62 MMHG | TEMPERATURE: 97.5 F | OXYGEN SATURATION: 95 % | HEART RATE: 69 BPM | HEIGHT: 69 IN | BODY MASS INDEX: 37.03 KG/M2 | WEIGHT: 250 LBS

## 2024-04-06 NOTE — ED PROVIDER NOTES
"                                                        ED Provider Note    CHIEF COMPLAINT  Chief Complaint   Patient presents with    Cough     Since 3 weeks     Pt said she went to hospital, 4 days ago due to this problem and was given antibiotic for pneumonia/bronchitis    Denied any fever    Shortness of Breath     Pt said that despite the antibiotic she is taking the shortness of breath is worsening    Dizziness     Today    Denied any chest pain        HPI    Primary care provider: Pcp Pt States None   History obtained from: Patient  History limited by: None     Suellen Barbosa is a 43 y.o. female who presents to the ED complaining of cough and shortness of breath on and off for about 3 weeks along with subjective fever and chills and feeling tired.  She reports feeling like her cough is \"solid\" and not able to bring up any mucus.  She reports feeling dizzy.  Patient states that she saw her doctor and was started on Levaquin which she has taken for 4 days with 1 day left and without improvement of her symptoms.  No fever that she is aware of although she has been feeling cold.  No chest pain.  She denies nausea/vomiting.  She denies possibility of pregnancy with past hysterectomy.  She reports increased urination with slight burning.  She also has had slight diarrhea since taking Levaquin.  No rash or edema.  She denies recent travels.  She reports that her grandkids have been sick but otherwise no ill contacts.    REVIEW OF SYSTEMS  Please see HPI for pertinent positives/negatives.  All other systems reviewed and are negative.     PAST MEDICAL HISTORY  Past Medical History:   Diagnosis Date    Dental infection 05/22/2021    Obesity (BMI 30-39.9) 9/10/2020    Anemia     pt unsure what kind of anemia; received blood transfusion at [facility] on 8/1/2020    Arthritis     joints, not defined between osteo vs rheumatoid        SURGICAL HISTORY  Past Surgical History:   Procedure Laterality Date    BREAST " "BIOPSY Right 5/26/2021    Procedure: BIOPSY, BREAST - EXCISION, PAINFUL MASS.;  Surgeon: Netta Blanca M.D.;  Location: SURGERY [facility];  Service: General    BREAST BIOPSY Right 12/11/2020    Procedure: BIOPSY, BREAST- FOR EXPLORATION AND DEBRIDEMENT WOUND, EXCISION OF MAMMERY FISTULA;  Surgeon: Netta Blanca M.D.;  Location: SURGERY [facility];  Service: General    INCISION AND DRAINAGE GENERAL Right 9/10/2020    Procedure: INCISION AND DRAINAGE;  Surgeon: Netta Blanca M.D.;  Location: SURGERY [facility];  Service: General    BREAST BIOPSY Right 9/10/2020    Procedure: BIOPSY, BREAST;  Surgeon: Netta Blanca M.D.;  Location: SURGERY [facility];  Service: General    HYSTERECTOMY LAPAROSCOPY  2020    PRIMARY C SECTION      x2        SOCIAL HISTORY  Social History     Tobacco Use    Smoking status: Never    Smokeless tobacco: Never   Vaping Use    Vaping Use: Never used   Substance and Sexual Activity    Alcohol use: Never    Drug use: Never    Sexual activity: Not on file        FAMILY HISTORY  History reviewed. No pertinent family history.     CURRENT MEDICATIONS  Home Medications       Reviewed by Yohana Lemon R.N. (Registered Nurse) on 04/05/24 at 2053  Med List Status: Partial     Medication Last Dose Status   ondansetron (ZOFRAN ODT) 4 MG TABLET DISPERSIBLE  Active                     ALLERGIES  No Known Allergies     PHYSICAL EXAM  VITAL SIGNS: BP 98/62   Pulse 69   Temp 36.4 °C (97.5 °F) (Temporal)   Resp 16   Ht 1.753 m (5' 9\")   Wt 113 kg (250 lb)   LMP 08/26/2020 (Approximate)   SpO2 95%   BMI 36.92 kg/m²  @BRADLEY[[id number]::@     Pulse ox interpretation: 95% I interpret this pulse ox as normal     Cardiac monitor interpretation: Sinus rhythm with heart rate in the 70s as interpreted by me.  The patient presented with cough and shortness of breath and cardiac monitor was ordered to monitor for dysrhythmia.    Constitutional: Well developed, well nourished, alert " in no apparent distress, nontoxic appearance    HENT: No external signs of trauma, normocephalic  Eyes: PERRL, conjunctiva without erythema, no discharge, no icterus    Neck: Soft and supple, trachea midline, no stridor, no tenderness, no LAD, good ROM    Cardiovascular: Regular rate and rhythm, no murmurs/rubs/gallops, strong distal pulses and good perfusion    Thorax & Lungs: No respiratory distress, CTAB    Abdomen: Soft, nontender, nondistended, no guarding, no rebound, normal BS    Back: No CVAT     Extremities: No cyanosis, no edema, no gross deformity, good ROM, intact distal pulses with brisk cap refill    Skin: Warm, dry, no pallor/cyanosis, no rash noted      Neuro: A/O times 3, no focal deficits noted    Psychiatric: Cooperative, normal mood and affect, normal judgement, appropriate for clinical situation        DIAGNOSTIC STUDIES / PROCEDURES    EKG  12 Lead EKG obtained at 2134 and interpreted by me:   Rate: 75   Rhythm: Sinus rhythm   Ectopy: None  Intervals: Normal   Axis: Normal   QRS: Normal   ST segments: Normal  T Waves: Normal    Clinical Impression: Sinus rhythm with baseline artifacts without acute ischemic changes or dysrhythmia  Compared to September 23, 2020 without significant change      LABS  All labs reviewed by me.     Results for orders placed or performed during the hospital encounter of 04/05/24   CBC with Differential   Result Value Ref Range    WBC 7.4 4.8 - 10.8 K/uL    RBC 4.19 (L) 4.20 - 5.40 M/uL    Hemoglobin 13.2 12.0 - 16.0 g/dL    Hematocrit 36.6 (L) 37.0 - 47.0 %    MCV 87.4 81.4 - 97.8 fL    MCH 31.5 27.0 - 33.0 pg    MCHC 36.1 (H) 32.2 - 35.5 g/dL    RDW 40.8 35.9 - 50.0 fL    Platelet Count 297 164 - 446 K/uL    MPV 9.2 9.0 - 12.9 fL    Neutrophils-Polys 60.30 44.00 - 72.00 %    Lymphocytes 29.20 22.00 - 41.00 %    Monocytes 7.50 0.00 - 13.40 %    Eosinophils 1.90 0.00 - 6.90 %    Basophils 0.30 0.00 - 1.80 %    Immature Granulocytes 0.80 0.00 - 0.90 %    Nucleated RBC  0.00 0.00 - 0.20 /100 WBC    Neutrophils (Absolute) 4.45 1.82 - 7.42 K/uL    Lymphs (Absolute) 2.15 1.00 - 4.80 K/uL    Monos (Absolute) 0.55 0.00 - 0.85 K/uL    Eos (Absolute) 0.14 0.00 - 0.51 K/uL    Baso (Absolute) 0.02 0.00 - 0.12 K/uL    Immature Granulocytes (abs) 0.06 0.00 - 0.11 K/uL    NRBC (Absolute) 0.00 K/uL   Comp Metabolic Panel   Result Value Ref Range    Sodium 139 135 - 145 mmol/L    Potassium 3.5 (L) 3.6 - 5.5 mmol/L    Chloride 106 96 - 112 mmol/L    Co2 20 20 - 33 mmol/L    Anion Gap 13.0 7.0 - 16.0    Glucose 130 (H) 65 - 99 mg/dL    Bun 16 8 - 22 mg/dL    Creatinine 0.84 0.50 - 1.40 mg/dL    Calcium 9.2 8.5 - 10.5 mg/dL    Correct Calcium 9.4 8.5 - 10.5 mg/dL    AST(SGOT) 16 12 - 45 U/L    ALT(SGPT) 22 2 - 50 U/L    Alkaline Phosphatase 86 30 - 99 U/L    Total Bilirubin <0.2 0.1 - 1.5 mg/dL    Albumin 3.8 3.2 - 4.9 g/dL    Total Protein 6.9 6.0 - 8.2 g/dL    Globulin 3.1 1.9 - 3.5 g/dL    A-G Ratio 1.2 g/dL   URINALYSIS (UA)    Specimen: Urine, Clean Catch   Result Value Ref Range    Color Yellow     Character Clear     Specific Gravity 1.021 <1.035    Ph 5.5 5.0 - 8.0    Glucose Negative Negative mg/dL    Ketones Negative Negative mg/dL    Protein Negative Negative mg/dL    Bilirubin Negative Negative    Urobilinogen, Urine 0.2 Negative    Nitrite Negative Negative    Leukocyte Esterase Negative Negative    Occult Blood Negative Negative    Micro Urine Req see below    TROPONIN   Result Value Ref Range    Troponin T <6 6 - 19 ng/L   ESTIMATED GFR   Result Value Ref Range    GFR (CKD-EPI) 88 >60 mL/min/1.73 m 2   EKG (NOW)   Result Value Ref Range    Report       [facility]    Test Date:  2024  Pt Name:    ARDEN ROWE               Department: ER  MRN:        [id number]                      Room:  Gender:     Female                       Technician: [id number]  :        [id number]                   Requested By:ER TRIAGE PROTOCOL  Order #:    [id number]                     Reading MD:    Measurements  Intervals                                Axis  Rate:       75                           P:          29  NY:         44                           QRS:        40  QRSD:       111                          T:          58  QT:         405  QTc:        453    Interpretive Statements  Sinus rhythm  Short NY interval  Incomplete left bundle branch block  Low voltage, precordial leads  Compared to ECG 09/23/2020 01:30:49  Short NY interval now present  Left bundle-branch block now present  Low QRS voltage now present     POC CoV-2, FLU A/B, RSV by PCR   Result Value Ref Range    POC Influenza A RNA, PCR Negative Negative    POC Influenza B RNA, PCR Negative Negative    POC RSV, by PCR Negative Negative    POC SARS-CoV-2, PCR NotDetected         RADIOLOGY  I have independently interpreted the diagnostic imaging associated with this visit and am waiting the final reading from the radiologist.   My preliminary interpretation is as follows: No acute findings on chest x-ray.    DX-CHEST-PORTABLE (1 VIEW)   Final Result      1.  Minimal linear left lower lobe opacity likely due to atelectasis or scarring.             COURSE & MEDICAL DECISION MAKING  Nursing notes, VS, PMSFHx reviewed in chart.     Review of past medical records shows the patient was last seen in this ED August 17, 2023 with flank pain with diagnosis of pyelonephritis.  She was seen at urgent care on May 6, 2022 with diagnosis of dental infection.  Patient was seen in the office by PMR on April 4, 2022 with diagnosis of central pain syndrome.      Differential diagnoses considered include but are not limited to: AMI, pericardial effusion/tamponade, pericarditis, CHF/pulm edema, PE, pneumothorax, pneumonia, pleural effusion, asthma, bronchospasm, bronchitis, respiratory infection       ED Observation Status? Yes; I am placing the patient in to an observation status due to a diagnostic uncertainty as well as therapeutic  intensity. Patient placed in observation status at 9:25 PM, 4/5/2024.     Observation plan is as follows: We will obtain EKG, imaging and laboratory studies and monitor patient in the ED.    Upon Reevaluation, the patient's condition has: Remained stable and will be discharged.    Patient discharged from ED Observation status at 0026 on April 6, 2024.      INITIAL ASSESSMENT AND PLAN  Care Narrative: This is a 43-year-old female patient with medical history including obesity, anemia, arthritis who presents to the ED complaining of cough and shortness of breath on and off for about 3 weeks not improving despite taking Levaquin.  Initial exam in the ED is benign.  Will obtain EKG, imaging and laboratory studies and closely monitor patient in the ED.      Discussion of management with other QHP or appropriate source(s): None     Escalation of care considered, and ultimately not performed: acute inpatient care management, however at this time, the patient is most appropriate for outpatient management.     Decision tools and prescription drugs considered including, but not limited to: Antibiotics   and Medication modification   .        The patient was ruled out for PE by PERC criteria:    Age < 50  HR < 100  RA sat > 94%  No hx of DVT/PE  No hemoptysis  No recent surgery/trauma (4 weeks)  No estrogen/BCP  No unilateral leg swelling        History and physical exam as above.  Given her reported shortness of breath and dizziness, EKG was obtained and without evidence for acute ischemic changes or dysrhythmia or significant change compared to prior and troponin without elevation.  This is unlikely to be ACS.  Unlikely to be PE using PERC criteria.  I also have low clinical suspicion for other emergent pathology such as decompensated CHF, myocarditis, dissection, tamponade.  Chest x-ray without acute findings.  Laboratory testing fairly unremarkable and unrevealing.  Patient was closely monitored in the ED and remained  clinically stable.  She tolerated oral intake without difficulty.  No respiratory distress or hypoxia.  No dysrhythmia or hemodynamic instability.  I discussed the findings with the patient.  This is a very pleasant patient in no acute distress and nontoxic in appearance with a benign exam.  At this time, I have low clinical suspicion for emergent pathology and no indications for admission.  She can finish her last dose of Levaquin.  I discussed with patient supportive home care for likely viral process, outpatient follow-up and return to ED precautions.  Patient verbalized understanding and agreed with plan of care with no further questions or concerns.      The patient is referred to a primary physician for blood pressure management, diabetic screening, and for all other preventative health concerns.       FINAL IMPRESSION  1. Acute cough Acute   2. Shortness of breath Acute   3. Dizziness Acute          DISPOSITION  Patient will be discharged home in stable condition.       FOLLOW UP  Please follow-up with your doctor    Call in 2 days      [facility], Emergency Dept  [address] [address] [id number] [phone]    If symptoms worsen         OUTPATIENT MEDICATIONS  Discharge Medication List as of 4/6/2024 12:59 AM             Electronically signed by: Enrique Rivera D.O., 4/5/2024 9:25 PM      Portions of this record were made with voice recognition software.  Despite my review, errors may remain.  Please interpret this chart in the appropriate context.

## 2024-04-06 NOTE — ED NOTES
PO challenge started, water offered. Pt able to drink water without complications, no nausea and vomiting.

## 2024-04-06 NOTE — ED TRIAGE NOTES
Chief Complaint   Patient presents with    Cough     Since 3 weeks     Pt said she went to hospital, 4 days ago due to this problem and was given antibiotic for pneumonia/bronchitis    Denied any fever    Shortness of Breath     Pt said that despite the antibiotic she is taking the shortness of breath is worsening    Dizziness     Today    Denied any chest pain     Pain: 4/10 - upper airway area/ chest    Pt came in to triage ambulatory with steady gait for the above complaints.     Pt is alert and oriented x 4, speaking in full sentences, follows commands and responds appropriately to questions.     Respirations are even and unlabored.    Pt placed in lobby. Pt educated on triage process.     Pt encouraged to inform staff for any changes in condition or if needs help while waiting to be room in.    Vitals:    04/05/24 2041   BP: 121/75   Pulse: 89   Resp: 16   Temp: 37.2 °C (98.9 °F)   SpO2: 98%

## (undated) DEVICE — MASK ANESTHESIA ADULT  - (100/CA)

## (undated) DEVICE — NEPTUNE 4 PORT MANIFOLD - (20/PK)

## (undated) DEVICE — PACK MINOR BASIN - (2EA/CA)

## (undated) DEVICE — DRESSING ABDOMINAL PAD STERILE 8 X 10" (360EA/CA)"

## (undated) DEVICE — SODIUM CHL IRRIGATION 0.9% 1000ML (12EA/CA)

## (undated) DEVICE — SUTURE 4-0 MONOCRYL PLUS PS-1 - 27 INCH (36/BX)

## (undated) DEVICE — SLEEVE, VASO, THIGH, MED

## (undated) DEVICE — HEAD HOLDER JUNIOR/ADULT

## (undated) DEVICE — GLOVE BIOGEL INDICATOR SZ 7SURGICAL PF LTX - (50/BX 4BX/CA)

## (undated) DEVICE — LACTATED RINGERS INJ 1000 ML - (14EA/CA 60CA/PF)

## (undated) DEVICE — GOWN WARMING STANDARD FLEX - (30/CA)

## (undated) DEVICE — PROTECTOR ULNA NERVE - (36PR/CA)

## (undated) DEVICE — SENSOR SPO2 NEO LNCS ADHESIVE (20/BX) SEE USER NOTES

## (undated) DEVICE — GAUZE PACKING STRIP PLAIN STERILE - 1 IN X 5 YD (12/CA)

## (undated) DEVICE — SPONGE GAUZESTER. 2X2 4-PL - (2/PK 50PK/BX 30BX/CS)

## (undated) DEVICE — DRESSING TRANSPARENT FILM TEGADERM 4 X 4.75" (50EA/BX)"

## (undated) DEVICE — DRAPE LAPAROTOMY T SHEET - (12EA/CA)

## (undated) DEVICE — KIT ANESTHESIA W/CIRCUIT & 3/LT BAG W/FILTER (20EA/CA)

## (undated) DEVICE — SET LEADWIRE 5 LEAD BEDSIDE DISPOSABLE ECG (1SET OF 5/EA)

## (undated) DEVICE — GLOVE BIOGEL PI INDICATOR SZ 6.5 SURGICAL PF LF - (50/BX 4BX/CA)

## (undated) DEVICE — GLOVE, LITE (PAIR)

## (undated) DEVICE — TOWELS CLOTH SURGICAL - (4/PK 20PK/CA)

## (undated) DEVICE — ELECTRODE 850 FOAM ADHESIVE - HYDROGEL RADIOTRNSPRNT (50/PK)

## (undated) DEVICE — CHLORAPREP 26 ML APPLICATOR - ORANGE TINT(25/CA)

## (undated) DEVICE — TOWEL STOP TIMEOUT SAFETY FLAG (40EA/CA)

## (undated) DEVICE — NEEDLE NON SAFETY HYPO 22 GA X 1 1/2 IN (100/BX)

## (undated) DEVICE — DRAPE CHEST/BREAST - (12EA/CA)

## (undated) DEVICE — SUTURE 3-0 VICRYL PLUS SH - 8X 18 INCH (12/BX)

## (undated) DEVICE — SET EXTENSION WITH 2 PORTS (48EA/CA) ***PART #2C8610 IS A SUBSTITUTE*****

## (undated) DEVICE — GLOVE BIOGEL PI INDICATOR SZ 7.5 SURGICAL PF LF -(50/BX 4BX/CA)

## (undated) DEVICE — ADHESIVE MASTISOL - (48/BX)

## (undated) DEVICE — GLOVE SIZE 7.0 SURGEON ACCELERATOR FREE GREEN (50PR/BX, 4BX/CA)

## (undated) DEVICE — GLOVE BIOGEL SZ 6.5 SURGICAL PF LTX (50PR/BX 4BX/CA)

## (undated) DEVICE — ELECTRODE DUAL RETURN W/ CORD - (50/PK)

## (undated) DEVICE — SUTURE 2-0 VICRYL PLUS CT-1 - 8 X 18 INCH(12/BX)

## (undated) DEVICE — CATHETER IV 20 GA X 1-1/4 ---SURG.& SDS ONLY--- (50EA/BX)

## (undated) DEVICE — CANISTER SUCTION 3000ML MECHANICAL FILTER AUTO SHUTOFF MEDI-VAC NONSTERILE LF DISP  (40EA/CA)

## (undated) DEVICE — SUTURE GENERAL

## (undated) DEVICE — SUTURE 4-0 VICRYL PLUS FS-2 - 27 INCH (36/BX)

## (undated) DEVICE — DERMABOND ADVANCED - (12EA/BX)

## (undated) DEVICE — SUCTION INSTRUMENT YANKAUER BULBOUS TIP W/O VENT (50EA/CA)

## (undated) DEVICE — GLOVE COTTON STERILE (50/CA)

## (undated) DEVICE — SUTURE 4-0 MONOCRYL PLUS PS-2 - 27 INCH (36/BX)

## (undated) DEVICE — BOVIE BLADE COATED &INSULATED - 25/PK

## (undated) DEVICE — BLADE SURGICAL #15 - (50/BX 3BX/CA)

## (undated) DEVICE — GLOVE SZ 7.5 BIOGEL PI MICRO - PF LF (50PR/BX)

## (undated) DEVICE — GLOVE BIOGEL PI ULTRATOUCH SZ 8.0 SURGICAL PF LF - (50/BX 4BX/CA)

## (undated) DEVICE — TUBING CLEARLINK DUO-VENT - C-FLO (48EA/CA)

## (undated) DEVICE — DETERGENT RENUZYME PLUS 10 OZ PACKET (50/BX)

## (undated) DEVICE — DRAPE LARGE 3 QUARTER - (20/CA)

## (undated) DEVICE — SUTURE 3-0 VICRYL PLUS SH - 27 INCH (36/BX)

## (undated) DEVICE — SYRINGE 10 ML CONTROL LL (25EA/BX 4BX/CA)

## (undated) DEVICE — GLOVE BIOGEL PI INDICATOR SZ 7.0 SURGICAL PF LF - (50/BX 4BX/CA)

## (undated) DEVICE — MASK, LARYNGEAL AIRWAY #4

## (undated) DEVICE — CONTAINER SPECIMEN BAG OR - STERILE 4 OZ W/LID (100EA/CA)